# Patient Record
Sex: MALE | Race: WHITE | Employment: FULL TIME | URBAN - NONMETROPOLITAN AREA
[De-identification: names, ages, dates, MRNs, and addresses within clinical notes are randomized per-mention and may not be internally consistent; named-entity substitution may affect disease eponyms.]

---

## 2017-10-09 ENCOUNTER — HOSPITAL ENCOUNTER (EMERGENCY)
Age: 37
Discharge: HOME | End: 2017-10-09
Attending: EMERGENCY MEDICINE
Payer: COMMERCIAL

## 2017-10-09 VITALS
DIASTOLIC BLOOD PRESSURE: 66 MMHG | RESPIRATION RATE: 10 BRPM | SYSTOLIC BLOOD PRESSURE: 108 MMHG | BODY MASS INDEX: 30.53 KG/M2 | WEIGHT: 190 LBS | HEIGHT: 66 IN | OXYGEN SATURATION: 100 % | HEART RATE: 71 BPM | TEMPERATURE: 97.4 F

## 2017-10-09 DIAGNOSIS — R55 NEAR SYNCOPE: Primary | ICD-10-CM

## 2017-10-09 LAB
ALBUMIN SERPL-MCNC: 4.2 G/DL (ref 3.5–5.1)
ALP BLD-CCNC: 71 U/L (ref 38–126)
ALT SERPL-CCNC: 55 U/L (ref 11–66)
AMMONIA: 37 UMOL/L (ref 11–60)
ANION GAP SERPL CALCULATED.3IONS-SCNC: 10 MEQ/L (ref 8–16)
AST SERPL-CCNC: 31 U/L (ref 5–40)
BASOPHILS # BLD: 0.4 %
BASOPHILS ABSOLUTE: 0 THOU/MM3 (ref 0–0.1)
BILIRUB SERPL-MCNC: 0.6 MG/DL (ref 0.3–1.2)
BILIRUBIN DIRECT: < 0.2 MG/DL (ref 0–0.3)
BUN BLDV-MCNC: 14 MG/DL (ref 7–22)
CALCIUM SERPL-MCNC: 8.8 MG/DL (ref 8.5–10.5)
CHLORIDE BLD-SCNC: 104 MEQ/L (ref 98–111)
CO2: 26 MEQ/L (ref 23–33)
CREAT SERPL-MCNC: 0.9 MG/DL (ref 0.4–1.2)
EKG ATRIAL RATE: 63 BPM
EKG P AXIS: 54 DEGREES
EKG P-R INTERVAL: 130 MS
EKG Q-T INTERVAL: 380 MS
EKG QRS DURATION: 86 MS
EKG QTC CALCULATION (BAZETT): 388 MS
EKG R AXIS: 65 DEGREES
EKG T AXIS: 31 DEGREES
EKG VENTRICULAR RATE: 63 BPM
EOSINOPHIL # BLD: 2.5 %
EOSINOPHILS ABSOLUTE: 0.1 THOU/MM3 (ref 0–0.4)
FLU A ANTIGEN: NEGATIVE
FLU B ANTIGEN: NEGATIVE
GFR SERPL CREATININE-BSD FRML MDRD: > 90 ML/MIN/1.73M2
GLUCOSE BLD-MCNC: 107 MG/DL (ref 70–108)
HCT VFR BLD CALC: 47.3 % (ref 42–52)
HEMOGLOBIN: 16.4 GM/DL (ref 14–18)
HETEROPHILE ANTIBODIES: NEGATIVE
LYMPHOCYTES # BLD: 38.9 %
LYMPHOCYTES ABSOLUTE: 1.8 THOU/MM3 (ref 1–4.8)
MCH RBC QN AUTO: 30.3 PG (ref 27–31)
MCHC RBC AUTO-ENTMCNC: 34.6 GM/DL (ref 33–37)
MCV RBC AUTO: 87.5 FL (ref 80–94)
MONOCYTES # BLD: 8.7 %
MONOCYTES ABSOLUTE: 0.4 THOU/MM3 (ref 0.4–1.3)
NUCLEATED RED BLOOD CELLS: 0 /100 WBC
OSMOLALITY CALCULATION: 280.3 MOSMOL/KG (ref 275–300)
PDW BLD-RTO: 13.1 % (ref 11.5–14.5)
PLATELET # BLD: 117 THOU/MM3 (ref 130–400)
PMV BLD AUTO: 9.6 MCM (ref 7.4–10.4)
POTASSIUM SERPL-SCNC: 4 MEQ/L (ref 3.5–5.2)
RBC # BLD: 5.4 MILL/MM3 (ref 4.7–6.1)
RBC # BLD: NORMAL 10*6/UL
SEG NEUTROPHILS: 49.5 %
SEGMENTED NEUTROPHILS ABSOLUTE COUNT: 2.3 THOU/MM3 (ref 1.8–7.7)
SODIUM BLD-SCNC: 140 MEQ/L (ref 135–145)
TOTAL CK: 213 U/L (ref 55–170)
TOTAL PROTEIN: 6.5 G/DL (ref 6.1–8)
WBC # BLD: 4.7 THOU/MM3 (ref 4.8–10.8)

## 2017-10-09 PROCEDURE — 96361 HYDRATE IV INFUSION ADD-ON: CPT

## 2017-10-09 PROCEDURE — 87804 INFLUENZA ASSAY W/OPTIC: CPT

## 2017-10-09 PROCEDURE — 82140 ASSAY OF AMMONIA: CPT

## 2017-10-09 PROCEDURE — 82550 ASSAY OF CK (CPK): CPT

## 2017-10-09 PROCEDURE — 80076 HEPATIC FUNCTION PANEL: CPT

## 2017-10-09 PROCEDURE — 85025 COMPLETE CBC W/AUTO DIFF WBC: CPT

## 2017-10-09 PROCEDURE — 2580000003 HC RX 258: Performed by: EMERGENCY MEDICINE

## 2017-10-09 PROCEDURE — 80048 BASIC METABOLIC PNL TOTAL CA: CPT

## 2017-10-09 PROCEDURE — 96360 HYDRATION IV INFUSION INIT: CPT

## 2017-10-09 PROCEDURE — 36415 COLL VENOUS BLD VENIPUNCTURE: CPT

## 2017-10-09 PROCEDURE — 99284 EMERGENCY DEPT VISIT MOD MDM: CPT

## 2017-10-09 PROCEDURE — 93005 ELECTROCARDIOGRAM TRACING: CPT | Performed by: EMERGENCY MEDICINE

## 2017-10-09 PROCEDURE — 86308 HETEROPHILE ANTIBODY SCREEN: CPT

## 2017-10-09 RX ORDER — 0.9 % SODIUM CHLORIDE 0.9 %
1000 INTRAVENOUS SOLUTION INTRAVENOUS ONCE
Status: COMPLETED | OUTPATIENT
Start: 2017-10-09 | End: 2017-10-09

## 2017-10-09 RX ADMIN — SODIUM CHLORIDE 1000 ML: 9 INJECTION, SOLUTION INTRAVENOUS at 12:21

## 2017-10-09 NOTE — ED PROVIDER NOTES
[facility]  eMERGENCY dEPARTMENT eNCOUnter          CHIEF COMPLAINT       Chief Complaint   Patient presents with    Dizziness    Other     near syncope    Chest Pain       Nurses Notes reviewed and I agree except as noted in the HPI. HISTORY OF PRESENT ILLNESS    Monika Gamble is a 30 y.o. male. The patient presents to the ED for the evaluation of diaphoresis,  feeling of passing out and fatigue. He states he had an episode last Monday and then had 3 episodes within the hour toady that lasted 10-15 minutes. The patient states he has had this one time a long time ago, but doesn't remember when. He has an appointment with his PCP today at 2:45PM. He reports associated body aches, but he states that it is normal due to his job. He denies feeling skipping beats, irregular heart beat, ever wearing a heart monitor, cough, headache, fever or any recent illnesses. He denies any falls, injuries, or taking medications on a daily basis. REVIEW OF SYSTEMS     Constitutional: no fever or chills, positive for fatigue and diaphoresis  Respiratory: no dyspnea, no cough  Cardiovascular: positive for chest pain  Gastrointestinal : no abdominal pain    Remainder of review of systems is otherwise reviewed as negative. PAST MEDICAL HISTORY    has no past medical history on file. SURGICAL HISTORY      has no past surgical history on file. CURRENT MEDICATIONS       There are no discharge medications for this patient. ALLERGIES     is allergic to codeine. FAMILY HISTORY     has no family status information on file. family history is not on file. SOCIAL HISTORY      reports that he quit smoking about 13 months ago. He has never used smokeless tobacco. He reports that he does not drink alcohol or use drugs. PHYSICAL EXAM     INITIAL VITALS:  height is 5' 6\" (1.676 m) and weight is 190 lb (86.2 kg). His oral temperature is 97.4 °F (36.3 °C).  His blood pressure is 108/66 and his

## 2017-10-09 NOTE — ED NOTES
Patient states, I don't feel right, patient is sweaty and pale, head of bed elevated. Patient able to answer questions.       Lam Gaspar RN  10/09/17 1929

## 2017-11-03 ENCOUNTER — INITIAL CONSULT (OUTPATIENT)
Dept: NEUROLOGY | Age: 37
End: 2017-11-03
Payer: COMMERCIAL

## 2017-11-03 VITALS
HEART RATE: 54 BPM | BODY MASS INDEX: 31.53 KG/M2 | DIASTOLIC BLOOD PRESSURE: 75 MMHG | WEIGHT: 196.2 LBS | HEIGHT: 66 IN | SYSTOLIC BLOOD PRESSURE: 114 MMHG

## 2017-11-03 DIAGNOSIS — R41.0 EPISODIC CONFUSION: ICD-10-CM

## 2017-11-03 DIAGNOSIS — F07.81 POST-CONCUSSION SYNDROME: Primary | ICD-10-CM

## 2017-11-03 PROCEDURE — 99244 OFF/OP CNSLTJ NEW/EST MOD 40: CPT | Performed by: PSYCHIATRY & NEUROLOGY

## 2017-11-03 NOTE — PROGRESS NOTES
patient. Allergies   Allergen Reactions    Codeine Other (See Comments)     hallucinations       No current outpatient prescriptions on file. No current facility-administered medications for this visit. Social History     Social History    Marital status:      Spouse name: N/A    Number of children: N/A    Years of education: N/A     Social History Main Topics    Smoking status: Former Smoker     Quit date: 9/9/2016    Smokeless tobacco: Never Used    Alcohol use No    Drug use: No    Sexual activity: Not Asked     Other Topics Concern    None     Social History Narrative    None       Family History   Problem Relation Age of Onset    Cancer Father        Review of Systems   Complete review of systems is negative other than what is mentioned in HPI      Vitals:    11/03/17 0814   BP: 114/75   Site: Left Arm   Position: Sitting   Pulse: 54   Weight: 196 lb 3.2 oz (89 kg)   Height: 5' 5.98\" (1.676 m)       Physical Examination:  General appearance - alert, well appearing, and in no distress, oriented to person, place, and time and normal weight  Mental status- Level of Alertness: awake  Orientation: person, place, time  Memory: normal  Fund of Knowledge: normal  Attention/Concentration: normal  Language: normal. Mood is normal.   Neck - supple, no significant adenopathy, carotids upstroke normal bilaterally,   There is no carotid bruit . No neck lymphadenopathy .  No thyroid enlargement   Neurological -   Cranial Rlugje-FQ-SDR:.   Cranial nerve II: Normal   Cranial nerve III: Pupils: equal, round, reactive to light  Cranial nerves III, IV, VI: Extraocular Movements: intact   Cranial nerve V: Facial sensation: intact   Cranial nerve VII:Facial strength: intact   Cranial nerve VIII: Hearing: intact   Cranial nerve IX: Palate Elevation intact bilaterally  Cranial nerve XI: Shoulder shrug intact bilaterally  Cranial nerve XII: Tongue midline   neck supple without rigidity  DTR's are intact/decreased distal and symmetric  Babinski sign negative  Motor exam is 5/5 in the upper and lower extremities. Normal muscle tone . There is no muscle atrophy. Sensory is intact for light touch, cortical sensation. Coordination: finger to nose intact  Gait and station intact. Abnormal movement none, vibration normal, proprioception normal  Skin - normal coloration, no rashes, no suspicious skin lesions  Superficial temporal artery pulses are normal.   There is no limitation of range of motion of the neck. There is no resting tremor, no pin rolling, no bradykinesia, no Hypohonia, normal blink rate. Musculoskeletal: Has no hand arthritis, no limitation of ROM in any of the four extremities. There is no leg edema . The Heart was regular in rate and rhythm. No heart murmur  Chest Clear  Abdomen was soft. MRI brain performed at [address] [place] [address] was normal 10/23/2017. I reviewed this study, agree with interpretation. We reviewed the patient records from referring provider and available information in the EHR       ASSESSMENT:     1. Post-concussion syndrome     2. 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 He did not lose consciousness with the event. However, later he started to experience these episodes that are probably postconcussion syndrome, with episodic confusion. The patient's exam is nonfocal.  His mental status is intact at the time of my evaluation. Again, his symptoms are intermittent, he has good insight into what takes place. He would probably benefit from physical medicine rehab evaluation regarding postconcussion symptoms. In addition, we'll obtain workup for near syncope including EEG tilt table test.  We'll also arrange for 30 day event monitor.   I reviewed his MRI brain performed 10/23/2017, agree with interpretation being unremarkable. Given the nature of his symptoms, and his job. He is asked not to drive, swimming, or upright heavy machinery or being at compromising heights. He will address these restrictions with his work, if his work is unable to accommodate then we will take him off work, until determination is made that he is safe to return. The patient and his wife verbalized understanding to the workup plan, and recommendations. Plan    1. EEG  2. Tilt table test.   3. 30 day event monitor. 4. Consider PMNR referral re post concussion symptoms. 5. No driving, swimming, operating heavy machinery or compromising heights. If unable to be accommodate at work for restrictions, will take off work. 6. Call with any new symptoms or concerns. 7. Follow up in 3 weeks.      Yan Cazares MD

## 2017-11-08 ENCOUNTER — HOSPITAL ENCOUNTER (OUTPATIENT)
Dept: CT IMAGING | Age: 37
Discharge: HOME | End: 2017-11-08
Payer: COMMERCIAL

## 2017-11-08 ENCOUNTER — HOSPITAL ENCOUNTER (OUTPATIENT)
Dept: MRI IMAGING | Age: 37
Discharge: HOME | End: 2017-11-08
Payer: COMMERCIAL

## 2017-11-08 DIAGNOSIS — Z00.6 EXAMINATION FOR NORMAL COMPARISON FOR CLINICAL RESEARCH: ICD-10-CM

## 2017-11-08 PROCEDURE — 3209999900 MRI COMPARISON OF OUTSIDE FILMS

## 2017-11-08 PROCEDURE — 3209999900 CT COMPARISON OF OUTSIDE FILMS

## 2017-11-28 ENCOUNTER — HOSPITAL ENCOUNTER (OUTPATIENT)
Dept: NON INVASIVE DIAGNOSTICS | Age: 37
Discharge: HOME | End: 2017-11-28
Payer: COMMERCIAL

## 2017-11-28 ENCOUNTER — HOSPITAL ENCOUNTER (OUTPATIENT)
Dept: NEUROLOGY | Age: 37
Discharge: HOME | End: 2017-11-28
Payer: COMMERCIAL

## 2017-11-28 VITALS — HEIGHT: 67 IN | BODY MASS INDEX: 29.03 KG/M2 | WEIGHT: 185 LBS

## 2017-11-28 DIAGNOSIS — F07.81 POST-CONCUSSION SYNDROME: ICD-10-CM

## 2017-11-28 DIAGNOSIS — R41.0 EPISODIC CONFUSION: ICD-10-CM

## 2017-11-28 PROCEDURE — 93270 REMOTE 30 DAY ECG REV/REPORT: CPT

## 2017-11-28 PROCEDURE — 95819 EEG AWAKE AND ASLEEP: CPT

## 2017-11-28 PROCEDURE — 93660 TILT TABLE EVALUATION: CPT | Performed by: INTERNAL MEDICINE

## 2017-11-28 NOTE — PROGRESS NOTES
[facility] Laboratory Technician worksheet       EEG Date: 2017    Name: Severiano Bee   : 1980   Age: 30 y.o.   SEX: male    Room: OP    MRN: [id number]     CSN: [id number]    Ordering Provider: Eda Robles  EEG Number: [id number] Time of Test:  1024    Hand: Right   Sedation: No    H.V. Done: Yes with good effort Photic: Yes    Sleep: No   Drowsy: Yes   Sleep Deprived: No    Seizures observed: no    Technician impression:1    Mentality: alert       Clinical History: CONCUSSION ON  AND HAS HAD 7 EPISODES OF BREAKING UT IN SWEAT FEELING LIKE HE IS GOING TO PASS OUT    Past Medical History:       Diagnosis Date    Heart murmur          Prior to Admission medications    Not on File       Technician: Carlos Jim 2017

## 2017-11-29 NOTE — PROCEDURES
[address] [id number]                                  TILT TABLE TEST    PATIENT NAME: Anayeli Sultana                       :        1980  MED REC NO:   [id number]                           ROOM:  ACCOUNT NO:   [de-identified]                           ADMIT DATE: 2017  PROVIDER:     Ekaterina Larkin MD    DATE OF STUDY:  2017. The patient underwent a tilt table study for 22 minutes. At the start, his resting blood pressure was 122/73 and his heart rate was  51. Shortly, before the termination of the test at 22 minutes, his heart  rate was 85 and his systolic blood pressure was 120/74. The patient complained of nausea and lightheadedness. He requested that  his study be terminated. CONCLUSION:  Symptomatic study without significant change in heart rate or  systolic blood pressure.         Jules Feldman MD    D: 2017 11:33:54       T: 2017 12:01:33     [email or web address]  Job#: [id number]     Doc#: [id number]    CC:

## 2017-11-29 NOTE — PROCEDURES
[address] [place] [address]                            ELECTROENCEPHALOGRAM REPORT    PATIENT NAME: Kwesi Rothman                       :        1980  MED REC NO:   [id number]                           ROOM:  ACCOUNT NO:   [de-identified]                           ADMIT DATE: 2017  PROVIDER:     Zora Butcher. Manjula Saxena MD    DATE OF EE2017    REFERRING PROVIDER:  Zora Butcher. Manjula Saxena MD    CLINICAL HISTORY:  A 80-year-old male with history of concussion, had seven  episodes of breaking out in sweats, feeling like he is going to pass out. This is a 17-channel EEG performed without sleep deprivation. Hyperventilation was performed. Photic stimulation was performed. The  patient is described as alert. Background activity noted to be 10 Hz in posterior parietal area,  symmetric, well modulated, attenuates with eye opening. The patient felt  to be drowsy during parts of recording. Hyperventilation was performed for  3 minutes with good effort without abnormality. Lead and muscle artifacts  were noted limiting quality of data obtained. Photic stimulation was  performed with driving seen through some of the frequencies. EKG tracing  revealed regular heart rate. There was no evidence of epileptiform  activity appreciated throughout this recording. IMPRESSION:  This is a normal EEG. There was no evidence of epileptiform  activity appreciated.         Pippa Wick MD    D: 2017 14:18:51       T: 2017 14:22:14     [email or web address]  Job#: [id number]     Doc#: [id number]    CC:

## 2017-12-04 ENCOUNTER — TELEPHONE (OUTPATIENT)
Dept: NEUROLOGY | Age: 37
End: 2017-12-04

## 2017-12-04 NOTE — TELEPHONE ENCOUNTER
Patient called he was taken off work until December 3, 2017. He does not follow up with you until December 18, 2017. Can he get a letter extending his FMLA until he sees you on December 18, 2017. Please advise.

## 2017-12-18 ENCOUNTER — OFFICE VISIT (OUTPATIENT)
Dept: NEUROLOGY | Age: 37
End: 2017-12-18
Payer: COMMERCIAL

## 2017-12-18 VITALS
DIASTOLIC BLOOD PRESSURE: 78 MMHG | HEART RATE: 68 BPM | WEIGHT: 194 LBS | SYSTOLIC BLOOD PRESSURE: 132 MMHG | HEIGHT: 67 IN | BODY MASS INDEX: 30.45 KG/M2

## 2017-12-18 DIAGNOSIS — R41.0 EPISODIC CONFUSION: ICD-10-CM

## 2017-12-18 DIAGNOSIS — F07.81 POST-CONCUSSION SYNDROME: Primary | ICD-10-CM

## 2017-12-18 PROCEDURE — 99214 OFFICE O/P EST MOD 30 MIN: CPT | Performed by: PSYCHIATRY & NEUROLOGY

## 2017-12-18 NOTE — PROGRESS NOTES
with patient and his wife we agreed on the following plan. Plan:  1. Continue with 30 day event monitor. Wear device at all times. 2. No driving, swimming, operating heavy machinery or compromising heights. If unable to be accommodate at work for restrictions, will take off work. 3. Referral to [facility] referral re post concussion symptoms. 4. Neurology referral [facility] re episodic confusion. 5. He is to remain off work due to inability to drive, or operate heavy machinery until cleared. 6. Call with any new symptoms or concerns. 7. Follow up in 2 months. Time spent evaluating patient, reviewing records/imaging, with more than 50% of the time spent on counseling, arranging for care was more than 27 min. All patient's questions were answered. Please call if any questions.       Gabrielle Cornell MD

## 2018-02-28 ENCOUNTER — OFFICE VISIT (OUTPATIENT)
Dept: NEUROLOGY | Age: 38
End: 2018-02-28
Payer: COMMERCIAL

## 2018-02-28 VITALS
DIASTOLIC BLOOD PRESSURE: 70 MMHG | HEART RATE: 72 BPM | HEIGHT: 67 IN | WEIGHT: 195 LBS | SYSTOLIC BLOOD PRESSURE: 122 MMHG | BODY MASS INDEX: 30.61 KG/M2

## 2018-02-28 DIAGNOSIS — F07.81 POST-CONCUSSION SYNDROME: ICD-10-CM

## 2018-02-28 DIAGNOSIS — R41.0 EPISODIC CONFUSION: Primary | ICD-10-CM

## 2018-02-28 PROCEDURE — 99213 OFFICE O/P EST LOW 20 MIN: CPT | Performed by: PSYCHIATRY & NEUROLOGY

## 2018-02-28 NOTE — PROGRESS NOTES
Antigen NEGATIVE NEGATIVE   CBC auto differential   Result Value Ref Range    WBC 4.7 (L) 4.8 - 10.8 thou/mm3    RBC 5.40 4.70 - 6.10 mill/mm3    Hemoglobin 16.4 14.0 - 18.0 gm/dl    Hematocrit 47.3 42.0 - 52.0 %    MCV 87.5 80.0 - 94.0 fL    MCH 30.3 27.0 - 31.0 pg    MCHC 34.6 33.0 - 37.0 gm/dl    RDW 13.1 11.5 - 14.5 %    Platelets 043 (L) 896 - 400 thou/mm3    MPV 9.6 7.4 - 10.4 mcm    RBC Morphology NORMAL     Seg Neutrophils 49.5 %    Lymphocytes 38.9 %    Monocytes 8.7 %    Eosinophils 2.5 %    Basophils 0.4 %    nRBC 0 /100 wbc    Segs Absolute 2.3 1.8 - 7.7 thou/mm3    Lymphocytes # 1.8 1.0 - 4.8 thou/mm3    Monocytes # 0.4 0.4 - 1.3 thou/mm3    Eosinophils # 0.1 0.0 - 0.4 thou/mm3    Basophils # 0.0 0.0 - 0.1 thou/mm3   Basic Metabolic Panel   Result Value Ref Range    Sodium 140 135 - 145 meq/L    Potassium 4.0 3.5 - 5.2 meq/L    Chloride 104 98 - 111 meq/L    CO2 26 23 - 33 meq/L    Glucose 107 70 - 108 mg/dL    BUN 14 7 - 22 mg/dL    CREATININE 0.9 0.4 - 1.2 mg/dL    Calcium 8.8 8.5 - 10.5 mg/dL   CK   Result Value Ref Range    Total  (H) 55 - 170 U/L   Mononucleosis Screen   Result Value Ref Range    Monospot NEGATIVE NEGATIVE   Hepatic function panel   Result Value Ref Range    Alb 4.2 3.5 - 5.1 g/dL    Total Bilirubin 0.6 0.3 - 1.2 mg/dL    Bilirubin, Direct <0.2 0.0 - 0.3 mg/dL    Alkaline Phosphatase 71 38 - 126 U/L    AST 31 5 - 40 U/L    ALT 55 11 - 66 U/L    Total Protein 6.5 6.1 - 8.0 g/dL   Ammonia   Result Value Ref Range    Ammonia 37 11 - 60 umol/L   Anion Gap   Result Value Ref Range    Anion Gap 10.0 8.0 - 16.0 meq/L   Glomerular Filtration Rate, Estimated   Result Value Ref Range    Est, Glom Filt Rate >90 ml/min/1.73m2   Osmolality   Result Value Ref Range    Osmolality Calc 280.3 275.0 - 300 mOsmol/kg   EKG Dizziness   Result Value Ref Range    Ventricular Rate 63 BPM    Atrial Rate 63 BPM    P-R Interval 130 ms    QRS Duration 86 ms    Q-T Interval 380 ms    QTc Calculation

## 2018-05-09 ENCOUNTER — OFFICE VISIT (OUTPATIENT)
Dept: NEUROLOGY | Age: 38
End: 2018-05-09
Payer: COMMERCIAL

## 2018-05-09 VITALS
HEART RATE: 79 BPM | WEIGHT: 200 LBS | HEIGHT: 67 IN | BODY MASS INDEX: 31.39 KG/M2 | DIASTOLIC BLOOD PRESSURE: 70 MMHG | SYSTOLIC BLOOD PRESSURE: 128 MMHG

## 2018-05-09 DIAGNOSIS — R41.0 EPISODIC CONFUSION: Primary | ICD-10-CM

## 2018-05-09 PROCEDURE — 99213 OFFICE O/P EST LOW 20 MIN: CPT | Performed by: PSYCHIATRY & NEUROLOGY

## 2018-05-09 RX ORDER — VITAMIN B COMPLEX
1 CAPSULE ORAL DAILY
COMMUNITY
End: 2018-09-17

## 2018-08-17 ENCOUNTER — NURSE TRIAGE (OUTPATIENT)
Dept: ADMINISTRATIVE | Age: 38
End: 2018-08-17

## 2018-09-17 ENCOUNTER — OFFICE VISIT (OUTPATIENT)
Dept: UROLOGY | Age: 38
End: 2018-09-17
Payer: COMMERCIAL

## 2018-09-17 VITALS
HEIGHT: 66 IN | SYSTOLIC BLOOD PRESSURE: 120 MMHG | BODY MASS INDEX: 31.02 KG/M2 | WEIGHT: 193 LBS | DIASTOLIC BLOOD PRESSURE: 62 MMHG

## 2018-09-17 DIAGNOSIS — I86.1 VARICOCELE: Primary | ICD-10-CM

## 2018-09-17 DIAGNOSIS — N50.811 TESTICULAR PAIN, RIGHT: ICD-10-CM

## 2018-09-17 LAB
BILIRUBIN URINE: NEGATIVE
BLOOD URINE, POC: NEGATIVE
CHARACTER, URINE: CLEAR
COLOR, URINE: YELLOW
GLUCOSE URINE: NEGATIVE MG/DL
KETONES, URINE: NEGATIVE
LEUKOCYTE CLUMPS, URINE: NEGATIVE
NITRITE, URINE: NEGATIVE
PH, URINE: 7
PROTEIN, URINE: NEGATIVE MG/DL
SPECIFIC GRAVITY, URINE: >= 1.03 (ref 1–1.03)
UROBILINOGEN, URINE: 0.2 EU/DL

## 2018-09-17 PROCEDURE — 99204 OFFICE O/P NEW MOD 45 MIN: CPT | Performed by: NURSE PRACTITIONER

## 2018-09-17 PROCEDURE — 81003 URINALYSIS AUTO W/O SCOPE: CPT | Performed by: NURSE PRACTITIONER

## 2018-09-17 RX ORDER — LEVOTHYROXINE SODIUM 0.07 MG/1
75 TABLET ORAL DAILY
COMMUNITY

## 2018-09-17 RX ORDER — MIDODRINE HYDROCHLORIDE 5 MG/1
5 TABLET ORAL 3 TIMES DAILY
COMMUNITY
End: 2021-11-26

## 2018-09-17 RX ORDER — SILDENAFIL CITRATE 20 MG/1
20 TABLET ORAL PRN
Qty: 30 TABLET | Refills: 3 | Status: SHIPPED | OUTPATIENT
Start: 2018-09-17 | End: 2022-01-03

## 2018-09-17 NOTE — PROGRESS NOTES
grandfather. Social History  Sasha Larry  reports that he quit smoking about 2 years ago. He has never used smokeless tobacco. He reports that he does not drink alcohol or use drugs. Subjective:     Review of Systems  No problems with ears, nose or throat. No problems with eyes. No chest pain, shortness of breath, abdominal pain, extremity pain or weakness, and no neurological deficits. No rashes.  symptoms per HPI. The remainder of the review of symptoms is negative. Objective:     PE:   Vitals:    09/17/18 1032   BP: 120/62   Weight: 193 lb (87.5 kg)   Height: 5' 6\" (1.676 m)       Constitutional: Alert and oriented times 3, no acute distress and cooperative to examination with appropriate mood and affect. HENT:   Head:        Normocephalic and atraumatic. Mouth/Throat:         Mucous membranes are normal.   Eyes:         EOM are normal. No scleral icterus. PERRLA. Neck:        Supple, symmetrical, trachea midline  Pulmonary/Chest:      Chest symmetric with normal A/P diameter, Normal respiratory rate and rhthym. No use of accessory muscles. Abdominal:         Soft. No tenderness. Bowel sounds present. Musculoskeletal:         Normal range of motion. No edema or tenderness of lower extremities. Extremities: No cyanosis, clubbing, or edema present. Neurological:        Alert and oriented. No cranial nerve deficit. Lovetta Blaze     Psychiatric:        Normal mood and affect: Circumcised penis with urethral meatus in normal location, bilateral scrotal exam reveals a small epididymal cyst on the right, otherwise benign exam      Labs   Urine dip demonstrates   Results for POC orders placed in visit on 09/17/18   POCT Urinalysis No Micro (Auto)   Result Value Ref Range    Glucose, Ur Negative NEGATIVE mg/dl    Bilirubin Urine Negative     Ketones, Urine Negative NEGATIVE    Specific Gravity, Urine >= 1.030 1.002 - 1.03    Blood, UA POC Negative NEGATIVE    pH, Urine 7.00 5.0 - 9.0    Protein, Urine

## 2021-04-05 ENCOUNTER — OFFICE VISIT (OUTPATIENT)
Dept: PHYSICAL MEDICINE AND REHAB | Age: 41
End: 2021-04-05
Payer: COMMERCIAL

## 2021-04-05 VITALS
BODY MASS INDEX: 33.2 KG/M2 | SYSTOLIC BLOOD PRESSURE: 120 MMHG | WEIGHT: 206.6 LBS | DIASTOLIC BLOOD PRESSURE: 80 MMHG | HEIGHT: 66 IN

## 2021-04-05 DIAGNOSIS — M48.061 SPINAL STENOSIS OF LUMBAR REGION WITHOUT NEUROGENIC CLAUDICATION: ICD-10-CM

## 2021-04-05 DIAGNOSIS — M47.816 LUMBAR FACET ARTHROPATHY: Primary | ICD-10-CM

## 2021-04-05 DIAGNOSIS — M46.1 SACROILIAC INFLAMMATION (HCC): ICD-10-CM

## 2021-04-05 PROCEDURE — 99243 OFF/OP CNSLTJ NEW/EST LOW 30: CPT | Performed by: PAIN MEDICINE

## 2021-04-05 ASSESSMENT — ENCOUNTER SYMPTOMS
COLOR CHANGE: 0
EYE PAIN: 0
SINUS PRESSURE: 0
CHEST TIGHTNESS: 0
SHORTNESS OF BREATH: 0
SORE THROAT: 0
BOWEL INCONTINENCE: 0
CONSTIPATION: 0
COUGH: 0
BACK PAIN: 1
WHEEZING: 0
DIARRHEA: 0
NAUSEA: 0
VOMITING: 0
RHINORRHEA: 0
ABDOMINAL PAIN: 0
PHOTOPHOBIA: 0

## 2021-04-05 NOTE — PROGRESS NOTES
[address]  Dept: [phone]  Dept Fax: [phone]: [phone]    Visit Date: 4/5/2021    Patti Floyd is a 47 y.o. male who is referred for pain management evaluation and treatment per Dr. Vesna Meyer. CAGE [employer] [facility] Questions   1. In the last three months, have you felt you should cut down or stop drinking or using drugs? Yes []        No [x]     2. In the last three months, has anyone annoyed you or gotten on your nerves by telling you to cut down or stop drinking or using drugs? Yes []        No [x]     3. In the last three months, have you felt guilty or bad about how much you drink or use drugs? Yes []        No [x]     4. In the last three months, have you been waking up wanting to have an alcoholic drink or use drugs? Yes []        No [x]        Opioid Risk Tool:  Clinician Form       1. Family History of Substance Abuse: Female Male    Alcohol   []1   []3    Illegal drugs   []2   []3    Prescription drugs     []4   []4   2. Personal History of Substance Abuse:          Alcohol   []3   []3    Illegal drugs   []4   []4    Prescription drugs     []5   []5   3. Age (loc box if between 12 and 39):     []1   []1   4. History of Preadolescent Sexual Abuse:     []3   []0   5. Psychological Disease:      Attention deficit disorder, obsessive-compulsive disorder, bipolar, schizophrenia   []2   []2      Depression     []1   []1    Scoring Totals 0 0     Total Score  Low Risk  Moderate Risk  High Risk   Risk Category   0 - 3   4 - 7   8 or Above      Patient states symptoms interfere with:  A.  General Activity:  Yes, on concrete a lot or working  B. Mood: no    C. Walking Ability:   no   D. Normal Work (Includes both work outside the home and housework):   yes    E.  Relations with Other People:  no   F.   Sleep:   yes for the 20 mins  G. Enjoyment of Life:  no       HPI:     ChiefComplaint: Low back pain    Back Pain  Chronicity: Patient is a 46 y/o male with history of lower back pain for several years. States last severa years has became unbearable. Denies recent injuries or priro lumbar surgeries. The problem occurs constantly. The problem has been gradually worsening since onset. The pain is present in the lumbar spine. The quality of the pain is described as aching and stabbing. The pain does not radiate. Pain scale: Pain scale at worse is a 8/10 and at best is a 3/10. The pain is worse during the night. The symptoms are aggravated by bending, position and standing (walking, getting up from chair. States chelsi helps. ). Pertinent negatives include no abdominal pain, bladder incontinence, bowel incontinence, chest pain, fever, headaches, leg pain, numbness, tingling or weakness. Treatments tried: Massotherapy. The treatment provided mild relief. XR LUMBAR SPINE :  Shows lumbar facet arthropathy @ L4-5 and L5-S1     The patient is allergic to codeine. Subjective:      Review of Systems   Constitutional: Positive for activity change. Negative for appetite change, chills, diaphoresis, fatigue, fever and unexpected weight change. HENT: Negative for congestion, ear pain, hearing loss, mouth sores, nosebleeds, rhinorrhea, sinus pressure and sore throat. Eyes: Negative for photophobia, pain and visual disturbance. Respiratory: Negative for cough, chest tightness, shortness of breath and wheezing. MAURA-CPAP   Cardiovascular: Negative for chest pain and palpitations. Hx POTS   Gastrointestinal: Negative for abdominal pain, bowel incontinence, constipation, diarrhea, nausea and vomiting. Endocrine: Negative for cold intolerance, heat intolerance, polydipsia, polyphagia and polyuria.         Hx Hypothyroidism   Genitourinary: Negative for bladder incontinence, decreased urine volume, difficulty urinating, frequency and hematuria. Musculoskeletal: Positive for back pain. Negative for arthralgias, gait problem, joint swelling, myalgias, neck pain and neck stiffness. Skin: Negative for color change and rash. Allergic/Immunologic: Negative for food allergies and immunocompromised state. Neurological: Negative for dizziness, tingling, tremors, seizures, syncope, facial asymmetry, speech difficulty, weakness, light-headedness, numbness and headaches. Neuropathy   Hematological: Does not bruise/bleed easily. Psychiatric/Behavioral: Positive for sleep disturbance. Negative for agitation, behavioral problems, confusion, decreased concentration, dysphoric mood, hallucinations, self-injury and suicidal ideas. The patient is nervous/anxious. The patient is not hyperactive. Objective:     Vitals:    04/05/21 1545   BP: 120/80   Weight: 206 lb 9.6 oz (93.7 kg)   Height: 5' 6\" (1.676 m)       Physical Exam  Vitals signs and nursing note reviewed. Constitutional:       General: He is not in acute distress. Appearance: He is well-developed. He is not diaphoretic. HENT:      Head: Normocephalic and atraumatic. Right Ear: External ear normal.      Left Ear: External ear normal.      Nose: Nose normal.      Mouth/Throat:      Pharynx: No oropharyngeal exudate. Eyes:      General: No scleral icterus. Right eye: No discharge. Left eye: No discharge. Conjunctiva/sclera: Conjunctivae normal.      Pupils: Pupils are equal, round, and reactive to light. Neck:      Musculoskeletal: Full passive range of motion without pain, normal range of motion and neck supple. Normal range of motion. No edema, erythema, neck rigidity or muscular tenderness. Thyroid: No thyromegaly. Cardiovascular:      Rate and Rhythm: Normal rate and regular rhythm. Heart sounds: Normal heart sounds. No murmur. No friction rub. No gallop.     Pulmonary:      Effort: Pulmonary effort is normal. No respiratory distress. Breath sounds: Normal breath sounds. No wheezing or rales. Chest:      Chest wall: No tenderness. Abdominal:      General: Bowel sounds are normal. There is no distension. Palpations: Abdomen is soft. Tenderness: There is no abdominal tenderness. There is no guarding or rebound. Skin:     General: Skin is warm. Coloration: Skin is not pale. Findings: No erythema or rash. Neurological:      Mental Status: He is alert and oriented to person, place, and time. He is not disoriented. Cranial Nerves: No cranial nerve deficit. Sensory: Sensation is intact. No sensory deficit. Motor: Motor function is intact. No atrophy or abnormal muscle tone. Coordination: Coordination normal.      Gait: Gait normal.      Deep Tendon Reflexes: Reflexes are normal and symmetric. Babinski sign absent on the right side. Babinski sign absent on the left side. Psychiatric:         Attention and Perception: Attention normal. He is attentive. Mood and Affect: Mood normal. Mood is not anxious or depressed. Affect is not labile, blunt, angry or inappropriate. Speech: Speech normal. He is communicative. Speech is not rapid and pressured, delayed, slurred or tangential.         Behavior: Behavior normal. Behavior is not agitated, slowed, aggressive, withdrawn, hyperactive or combative. Behavior is cooperative. Thought Content: Thought content normal. Thought content is not paranoid or delusional. Thought content does not include homicidal or suicidal ideation. Thought content does not include homicidal or suicidal plan. Cognition and Memory: Cognition normal. Memory is not impaired. He does not exhibit impaired recent memory or impaired remote memory. Judgment: Judgment normal. Judgment is not impulsive or inappropriate. MELBA test: pos  Yeoman's test: pos  Gaenslen test: pos     Assessment:     1. Lumbar facet arthropathy    2.  Spinal stenosis of lumbar region with neurogenic claudication    3. Sacroiliac inflammation ([facility] [address]            Plan:      · Patient read and signed orientation and opioid agreement. · OARRS reviewed. Current MED: 0  · Patient was not offered naloxone for home. · Discussed long term side effects of medications, tolerance, dependency and addiction. · UDS preformed today. · Patient told can not receive any pain medications from any other source. · No evidence of abuse, diversion or aberrant behavior. · Prescription Needs: No prescription pain medications at this time   Medications and/or procedures to improve function and quality of life- patient understanding with this and that may not be pain free   Discussed possible weaning of medication dosing dependent on treatment/procedure results.  Discussed with patient about safe storage of medications at home   Testing: Reviewed XR Lumbar Spine .  Procedures: Bilateral L-facet MBB @ L4-5 and L5-S1 for diagnostic purpose.  Discussed with patient about risks with procedure including infection, reaction to medication, increased pain, or bleeding.  Medications: Reviewed.  If patient is on blood thinners will need approval to hold:   none      Previous Treatments tried:  · PT: Yes,  any benefit? Yes, how many weeks? 4-6, last date done: several times over last 2 years  · NSAIDs: No,    · Chiropractic: Yes,  any benefit? No  · Muscle relaxants: No,   · Narcotics: No,    · Spine surgeon consult: Yes  · Any Implants: No    Meds. Prescribed:   No orders of the defined types were placed in this encounter. Return Bilateral L-facet MBB @ L4-5 and L5-S1. Time spent with patient was 45 minutes more than 50% was spent  Counseling/coordinated the patient'scare.     Electronically signed by Juan Moses MD on 4/5/2021 at 5:43 PM

## 2021-04-06 ENCOUNTER — TELEPHONE (OUTPATIENT)
Dept: PHYSICAL MEDICINE AND REHAB | Age: 41
End: 2021-04-06

## 2021-04-06 NOTE — TELEPHONE ENCOUNTER
Pt. denies taking any blood thinners prior to procedure scheduling. Procedure and follow up scheduled. Educated on pre-procedure instructions, also mailed. Verbalized understanding.

## 2021-04-29 ENCOUNTER — TELEPHONE (OUTPATIENT)
Dept: PHYSICAL MEDICINE AND REHAB | Age: 41
End: 2021-04-29

## 2021-04-30 ENCOUNTER — ANESTHESIA (OUTPATIENT)
Dept: OPERATING ROOM | Age: 41
End: 2021-04-30
Payer: COMMERCIAL

## 2021-04-30 ENCOUNTER — ANESTHESIA EVENT (OUTPATIENT)
Dept: OPERATING ROOM | Age: 41
End: 2021-04-30
Payer: COMMERCIAL

## 2021-04-30 ENCOUNTER — HOSPITAL ENCOUNTER (OUTPATIENT)
Age: 41
Setting detail: OUTPATIENT SURGERY
Discharge: HOME | End: 2021-04-30
Attending: PAIN MEDICINE | Admitting: PAIN MEDICINE
Payer: COMMERCIAL

## 2021-04-30 ENCOUNTER — APPOINTMENT (OUTPATIENT)
Dept: GENERAL RADIOLOGY | Age: 41
End: 2021-04-30
Attending: PAIN MEDICINE
Payer: COMMERCIAL

## 2021-04-30 VITALS
SYSTOLIC BLOOD PRESSURE: 110 MMHG | DIASTOLIC BLOOD PRESSURE: 59 MMHG | OXYGEN SATURATION: 97 % | RESPIRATION RATE: 8 BRPM

## 2021-04-30 VITALS
HEIGHT: 66 IN | RESPIRATION RATE: 16 BRPM | SYSTOLIC BLOOD PRESSURE: 109 MMHG | HEART RATE: 72 BPM | DIASTOLIC BLOOD PRESSURE: 55 MMHG | BODY MASS INDEX: 31.82 KG/M2 | OXYGEN SATURATION: 96 % | WEIGHT: 198 LBS | TEMPERATURE: 97.1 F

## 2021-04-30 PROCEDURE — 7100000010 HC PHASE II RECOVERY - FIRST 15 MIN: Performed by: PAIN MEDICINE

## 2021-04-30 PROCEDURE — 6360000002 HC RX W HCPCS: Performed by: PAIN MEDICINE

## 2021-04-30 PROCEDURE — 3600000054 HC PAIN LEVEL 3 BASE: Performed by: PAIN MEDICINE

## 2021-04-30 PROCEDURE — 2709999900 HC NON-CHARGEABLE SUPPLY: Performed by: PAIN MEDICINE

## 2021-04-30 PROCEDURE — 2500000003 HC RX 250 WO HCPCS: Performed by: PAIN MEDICINE

## 2021-04-30 PROCEDURE — 64494 INJ PARAVERT F JNT L/S 2 LEV: CPT | Performed by: PAIN MEDICINE

## 2021-04-30 PROCEDURE — 6360000002 HC RX W HCPCS: Performed by: NURSE ANESTHETIST, CERTIFIED REGISTERED

## 2021-04-30 PROCEDURE — 7100000011 HC PHASE II RECOVERY - ADDTL 15 MIN: Performed by: PAIN MEDICINE

## 2021-04-30 PROCEDURE — 2500000003 HC RX 250 WO HCPCS: Performed by: NURSE ANESTHETIST, CERTIFIED REGISTERED

## 2021-04-30 PROCEDURE — 64493 INJ PARAVERT F JNT L/S 1 LEV: CPT | Performed by: PAIN MEDICINE

## 2021-04-30 PROCEDURE — 3209999900 FLUORO FOR SURGICAL PROCEDURES

## 2021-04-30 PROCEDURE — 3700000000 HC ANESTHESIA ATTENDED CARE: Performed by: PAIN MEDICINE

## 2021-04-30 RX ORDER — LIDOCAINE HYDROCHLORIDE 10 MG/ML
INJECTION, SOLUTION INFILTRATION; PERINEURAL PRN
Status: DISCONTINUED | OUTPATIENT
Start: 2021-04-30 | End: 2021-04-30 | Stop reason: ALTCHOICE

## 2021-04-30 RX ORDER — LIDOCAINE HYDROCHLORIDE 20 MG/ML
INJECTION, SOLUTION EPIDURAL; INFILTRATION; INTRACAUDAL; PERINEURAL PRN
Status: DISCONTINUED | OUTPATIENT
Start: 2021-04-30 | End: 2021-04-30 | Stop reason: SDUPTHER

## 2021-04-30 RX ORDER — BUPIVACAINE HYDROCHLORIDE 2.5 MG/ML
INJECTION, SOLUTION EPIDURAL; INFILTRATION; INTRACAUDAL PRN
Status: DISCONTINUED | OUTPATIENT
Start: 2021-04-30 | End: 2021-04-30 | Stop reason: ALTCHOICE

## 2021-04-30 RX ORDER — METHYLPREDNISOLONE ACETATE 80 MG/ML
INJECTION, SUSPENSION INTRA-ARTICULAR; INTRALESIONAL; INTRAMUSCULAR; SOFT TISSUE PRN
Status: DISCONTINUED | OUTPATIENT
Start: 2021-04-30 | End: 2021-04-30 | Stop reason: ALTCHOICE

## 2021-04-30 RX ORDER — PROPOFOL 10 MG/ML
INJECTION, EMULSION INTRAVENOUS PRN
Status: DISCONTINUED | OUTPATIENT
Start: 2021-04-30 | End: 2021-04-30 | Stop reason: SDUPTHER

## 2021-04-30 RX ADMIN — LIDOCAINE HYDROCHLORIDE 60 MG: 20 INJECTION, SOLUTION EPIDURAL; INFILTRATION; INTRACAUDAL; PERINEURAL at 09:02

## 2021-04-30 RX ADMIN — PROPOFOL 200 MG: 10 INJECTION, EMULSION INTRAVENOUS at 09:02

## 2021-04-30 ASSESSMENT — PULMONARY FUNCTION TESTS
PIF_VALUE: 0

## 2021-04-30 ASSESSMENT — PAIN DESCRIPTION - DESCRIPTORS: DESCRIPTORS: CONSTANT;ACHING

## 2021-04-30 ASSESSMENT — PAIN - FUNCTIONAL ASSESSMENT: PAIN_FUNCTIONAL_ASSESSMENT: 0-10

## 2021-04-30 NOTE — PROGRESS NOTES
1492- Pt arrived to PACU phase 2, [facility] RN at bedside for report, pt hooked up to monitor, VSS, pt sleepy, snoring, pt obstructing chin lift held. 0912- Sats 96% pt obstructs and trend down to 94%, chin lift needed again. 1624- Pt opened his eyes briefly. Pt back to sleep breathing regular not obstructing.   0921- Pt finally awake and talking,  0924- Coffee given, pt stil sleepy updated let him wake up a bit then he can go pt wanted [place]. 8250- IV removed  [id number]- Ride called.    8744- PT discharged walked out to car with this RN

## 2021-04-30 NOTE — H&P
H&P    Patient is a 48 y/o male with history of lower back pain for several years. States last severa years has became unbearable. Denies recent injuries or priro lumbar surgeries. The problem occurs constantly. The problem has been gradually worsening since onset. The pain is present in the lumbar spine. The quality of the pain is described as aching and stabbing. The pain does not radiate. Pain scale: Pain scale at worse is a 8/10 and at best is a 3/10. The pain is worse during the night. The symptoms are aggravated by bending, position and standing (walking, getting up from chair. States chelsi helps. ). Pertinent negatives include no abdominal pain, bladder incontinence, bowel incontinence, chest pain, fever, headaches, leg pain, numbness, tingling or weakness. Treatments tried: Massotherapy. The treatment provided mild relief.      XR LUMBAR SPINE :  Shows lumbar facet arthropathy @ L4-5 and L5-S1      The patient is allergic to codeine.        Subjective:      Review of Systems   Constitutional: Positive for activity change. Negative for appetite change, chills, diaphoresis, fatigue, fever and unexpected weight change. HENT: Negative for congestion, ear pain, hearing loss, mouth sores, nosebleeds, rhinorrhea, sinus pressure and sore throat. Eyes: Negative for photophobia, pain and visual disturbance. Respiratory: Negative for cough, chest tightness, shortness of breath and wheezing. MAURA-CPAP   Cardiovascular: Negative for chest pain and palpitations. Hx POTS   Gastrointestinal: Negative for abdominal pain, bowel incontinence, constipation, diarrhea, nausea and vomiting. Endocrine: Negative for cold intolerance, heat intolerance, polydipsia, polyphagia and polyuria. Hx Hypothyroidism   Genitourinary: Negative for bladder incontinence, decreased urine volume, difficulty urinating, frequency and hematuria. Musculoskeletal: Positive for back pain.  Negative for arthralgias, gait problem, joint swelling, myalgias, neck pain and neck stiffness. Skin: Negative for color change and rash. Allergic/Immunologic: Negative for food allergies and immunocompromised state. Neurological: Negative for dizziness, tingling, tremors, seizures, syncope, facial asymmetry, speech difficulty, weakness, light-headedness, numbness and headaches. Neuropathy   Hematological: Does not bruise/bleed easily. Psychiatric/Behavioral: Positive for sleep disturbance. Negative for agitation, behavioral problems, confusion, decreased concentration, dysphoric mood, hallucinations, self-injury and suicidal ideas. The patient is nervous/anxious. The patient is not hyperactive.          Objective:      Vitals   Vitals:     04/05/21 1545   BP: 120/80   Weight: 206 lb 9.6 oz (93.7 kg)   Height: 5' 6\" (1.676 m)            Physical Exam  Vitals signs and nursing note reviewed. Constitutional:       General: He is not in acute distress. Appearance: He is well-developed. He is not diaphoretic. HENT:      Head: Normocephalic and atraumatic. Right Ear: External ear normal.      Left Ear: External ear normal.      Nose: Nose normal.      Mouth/Throat:      Pharynx: No oropharyngeal exudate. Eyes:      General: No scleral icterus. Right eye: No discharge. Left eye: No discharge. Conjunctiva/sclera: Conjunctivae normal.      Pupils: Pupils are equal, round, and reactive to light. Neck:      Musculoskeletal: Full passive range of motion without pain, normal range of motion and neck supple. Normal range of motion. No edema, erythema, neck rigidity or muscular tenderness. Thyroid: No thyromegaly. Cardiovascular:      Rate and Rhythm: Normal rate and regular rhythm. Heart sounds: Normal heart sounds. No murmur. No friction rub. No gallop. Pulmonary:      Effort: Pulmonary effort is normal. No respiratory distress. Breath sounds: Normal breath sounds. No wheezing or rales. Chest:      Chest wall: No tenderness. Abdominal:      General: Bowel sounds are normal. There is no distension. Palpations: Abdomen is soft. Tenderness: There is no abdominal tenderness. There is no guarding or rebound. Skin:     General: Skin is warm. Coloration: Skin is not pale. Findings: No erythema or rash. Neurological:      Mental Status: He is alert and oriented to person, place, and time. He is not disoriented. Cranial Nerves: No cranial nerve deficit. Sensory: Sensation is intact. No sensory deficit. Motor: Motor function is intact. No atrophy or abnormal muscle tone. Coordination: Coordination normal.      Gait: Gait normal.      Deep Tendon Reflexes: Reflexes are normal and symmetric. Babinski sign absent on the right side. Babinski sign absent on the left side. Psychiatric:         Attention and Perception: Attention normal. He is attentive. Mood and Affect: Mood normal. Mood is not anxious or depressed. Affect is not labile, blunt, angry or inappropriate. Speech: Speech normal. He is communicative. Speech is not rapid and pressured, delayed, slurred or tangential.         Behavior: Behavior normal. Behavior is not agitated, slowed, aggressive, withdrawn, hyperactive or combative. Behavior is cooperative. Thought Content: Thought content normal. Thought content is not paranoid or delusional. Thought content does not include homicidal or suicidal ideation. Thought content does not include homicidal or suicidal plan. Cognition and Memory: Cognition normal. Memory is not impaired. He does not exhibit impaired recent memory or impaired remote memory. Judgment: Judgment normal. Judgment is not impulsive or inappropriate.         MELBA test: pos  Yeoman's test: pos  Gaenslen test: pos  Assessment:      1. Lumbar facet arthropathy    2. Spinal stenosis of lumbar region with neurogenic claudication    3.  Sacroiliac inflammation ([facility] [address]       Plan:      · Patient read and signed orientation and opioid agreement. · OARRS reviewed. Current MED: 0  · Patient was not offered naloxone for home. · Discussed long term side effects of medications, tolerance, dependency and addiction. · UDS preformed today. · Patient told can not receive any pain medications from any other source. · No evidence of abuse, diversion or aberrant behavior. · Prescription Needs: No prescription pain medications at this time  · Medications and/or procedures to improve function and quality of life- patient understanding with this and that may not be pain free  · Discussed possible weaning of medication dosing dependent on treatment/procedure results. · Discussed with patient about safe storage of medications at home  · Testing: Reviewed XR Lumbar Spine . · Procedures: Bilateral L-facet MBB @ L4-5 and L5-S1 for diagnostic purpose. · Discussed with patient about risks with procedure including infection, reaction to medication, increased pain, or bleeding. · Medications: Reviewed. · If patient is on blood thinners will need approval to hold:   none        Previous Treatments tried:  · PT: Yes,  any benefit? Yes, how many weeks? 4-6, last date done: several times over last 2 years  · NSAIDs: No,    · Chiropractic: Yes,  any benefit? No  · Muscle relaxants: No,   · Narcotics: No,    · Spine surgeon consult: Yes  · Any Implants: No     Meds. Prescribed:     Encounter Medications    No orders of the defined types were placed in this encounter.         Return Bilateral L-facet MBB @ L4-5 and L5-S1.

## 2021-04-30 NOTE — ANESTHESIA PRE PROCEDURE
Department of Anesthesiology  Preprocedure Note       Name:  Benito Singleton   Age:  47 y.o.  :  1980                                          MRN:  [id number]         Date:  2021      Surgeon: Nathan Bermudez):  Ammy Mcfarland MD    Procedure: Procedure(s):  Bilateral L-facet MBB @ L4-5 and L5-S1. Medications prior to admission:   Prior to Admission medications    Medication Sig Start Date End Date Taking? Authorizing Provider   levothyroxine (SYNTHROID) 25 MCG tablet Take 25 mcg by mouth Daily   Yes Historical Provider, MD   midodrine (PROAMATINE) 5 MG tablet Take 5 mg by mouth 3 times daily    Historical Provider, MD   metoprolol tartrate (LOPRESSOR) 25 MG tablet Take 12.5 mg by mouth 2 times daily    Historical Provider, MD   vitamin D (CHOLECALCIFEROL) 1000 UNIT TABS tablet Take 1,000 Units by mouth daily    Historical Provider, MD   sildenafil (REVATIO) 20 MG tablet Take 1 tablet by mouth as needed (ED) 18   DURGA Gerardo - CNP       Current medications:    No current facility-administered medications for this encounter. Allergies: Allergies   Allergen Reactions    Codeine Other (See Comments)     hallucinations       Problem List:  There is no problem list on file for this patient. Past Medical History:        Diagnosis Date    Heart murmur     Hypotension     POTS    POTS (postural orthostatic tachycardia syndrome)     Thyroid disease 2018       Past Surgical History:        Procedure Laterality Date    WISDOM TOOTH EXTRACTION         Social History:    Social History     Tobacco Use    Smoking status: Former Smoker     Quit date: 2016     Years since quittin.6    Smokeless tobacco: Never Used   Substance Use Topics    Alcohol use: No                                Counseling given: Not Answered      Vital Signs (Current): There were no vitals filed for this visit.                                            BP Readings from Last 3 Plan discussed with CRNA.                   Ruth Ann Paz MD   4/30/2021

## 2021-04-30 NOTE — OP NOTE
Operative Note  Pre-Procedure Note    Patient Name: Jeannie Armendariz   YOB: 1980  Room/Bed: [address] [place] [address] Pool/[facility]  Medical Record Number: [id number]  Date: 4/30/21      Indication:  Lower back pain  Consent: On file. Vital Signs:   Vitals:    04/30/21 0811   BP: 110/68   Pulse: 59   Resp: 16   Temp: 97.4 °F (36.3 °C)   SpO2: 97%       Pre-Sedation Documentation and Exam:   Vital signs have been reviewed (see flow sheet for vitals). Sedation/ Anesthesia Plan:   MAC    Patient is an appropriate candidate for plan of sedation: yes    Preoperative Diagnosis:   L-facet arthropathy    Postoperative Diagnosis:  As above    Procedure Performed:  Diagnostic/Confirmatory median branch blocks at the levels of L4-5 and L5-S1 bilateral under fluoroscopic guidance  # 1. .    Indication for the Procedure: The patient has a history of chronic low back pain that is not responding well to the conservative treatment. The patient's pain is mostly axial in nature. Pain is interfering with the activities of daily living. Physical examination revealed facet tenderness and facet loading is positive. The procedure and risks  were discussed with the patient and an informed consent was obtained. Procedure: Bilateral    Patient is placed in prone position and skin over  the back was prepped and draped in sterile manner. Then using fluoroscopy the junction of the transverse process of the vertebra with the superior process of the facet joint was observed and the view was optimized. The skin and deep tissues posterior were infiltrated with 10 ml of 1% lidocaine. Then a #22-gauge 3-1/2  inch spinal needle was introduced through the skin wheal under fluoroscopy guidance such that the tip of the needle lies at the junction of the transverse process with the superior processes of the facet joint. Then after negative aspiration a total of 8 ml of 0.25% Marcaine and depomedrol  (total 80 mg) was injected through the needles. EBL-0    For L5 Median branch block the junction of the ala of  the sacrum with the superior articular process of the facet joint was taken as a reference point and L4 median branch the junction of the transverse process the L5 with the superolateral possible facet joint was taken to the point and healthy median branch the junction of the transverse process of L4 with the superior lateral process of the facet joint was taken as a reference point. For S1 median branch the most lateral and superior aspect of S1 foramina was taken as a reference point. Patient's vital signs and neurological status remained stable through  the procedure and post procedural  Period. Patient was instructed to keep track of pain for next 24 hours. every hour and bring it with next visit. Patient tollerated the procedure well and was discharged home in stable condition.     Electronically signed by Margie Roberts MD on 4/30/21 at 9:14 AM EDT

## 2021-04-30 NOTE — H&P
[facility]  History and Physical Update    Pt Name: Alexsander Ruiz  MRN: [id number]  YOB: 1980  Date of evaluation: 4/30/2021      I have examined the patient and reviewed the H&P/Consult and there are no changes to the patient or plans.         Electronically signed by Madai Jones MD on 4/30/2021 at 8:36 AM

## 2021-05-12 ENCOUNTER — TELEPHONE (OUTPATIENT)
Dept: PHYSICAL MEDICINE AND REHAB | Age: 41
End: 2021-05-12

## 2021-05-12 ENCOUNTER — OFFICE VISIT (OUTPATIENT)
Dept: PHYSICAL MEDICINE AND REHAB | Age: 41
End: 2021-05-12
Payer: COMMERCIAL

## 2021-05-12 VITALS
BODY MASS INDEX: 31.82 KG/M2 | WEIGHT: 198 LBS | HEIGHT: 66 IN | DIASTOLIC BLOOD PRESSURE: 68 MMHG | SYSTOLIC BLOOD PRESSURE: 124 MMHG

## 2021-05-12 DIAGNOSIS — M46.1 SACROILIAC INFLAMMATION (HCC): ICD-10-CM

## 2021-05-12 DIAGNOSIS — M47.816 LUMBAR FACET ARTHROPATHY: Primary | ICD-10-CM

## 2021-05-12 DIAGNOSIS — M48.061 SPINAL STENOSIS OF LUMBAR REGION WITHOUT NEUROGENIC CLAUDICATION: ICD-10-CM

## 2021-05-12 DIAGNOSIS — M47.816 SPONDYLOSIS OF LUMBAR REGION WITHOUT MYELOPATHY OR RADICULOPATHY: ICD-10-CM

## 2021-05-12 PROCEDURE — 99214 OFFICE O/P EST MOD 30 MIN: CPT | Performed by: NURSE PRACTITIONER

## 2021-05-12 ASSESSMENT — ENCOUNTER SYMPTOMS
CONSTIPATION: 0
NAUSEA: 0
VOMITING: 0
COUGH: 0
EYE PAIN: 0
WHEEZING: 0
DIARRHEA: 0
ABDOMINAL PAIN: 0
SORE THROAT: 0
BACK PAIN: 1
SINUS PRESSURE: 0
PHOTOPHOBIA: 0
COLOR CHANGE: 0
CHEST TIGHTNESS: 0
SHORTNESS OF BREATH: 0
RHINORRHEA: 0

## 2021-05-12 NOTE — PROGRESS NOTES
[facility] [address]  Dept: [phone]  Dept Fax: [phone]: [phone]    Visit Date: 5/12/2021    Functionality Assessment/Goals Worksheet     On a scale of 0 (Does not Interfere) to 10 (Completely Interferes)     1. Which number describes how during the past week pain has interfered with       the following:  A. General Activity:  8  B. Mood: 8  C. Walking Ability:  8  D. Normal Work (Includes both work outside the home and housework):  8  E. Relations with Other People:   4  F. Sleep:   6  G. Enjoyment of Life:   7    2. Patient Prefers to Take their Pain Medications:     []  On a regular basis   []  Only when necessary    [x]  Does not take pain medications    3. What are the Patient's Goals/Expectations for Visiting Pain Management? [x]  Learn about my pain    []  Receive Medication   []  Physical Therapy     []  Treat Depression   []  Receive Injections    []  Treat Sleep   []  Deal with Anxiety and Stress   []  Treat Opoid Dependence/Addiction   []  Other:      HPI:   Stephen Arzate is a 47 y.o. male is here today for    Chief Complaint: Low back pain, Right leg pain, Left leg pain and Hip pain    HPI   F/U Lumbar facet MBB #1 L4-5,5-S1 bilateral 4/30/2021 states he received about 90% pain relief for 3-4 days. He continues to have low back pain bilateral SI thigh pain. He has increased pain with walking standing sitting getting up and down. Samuel Brittle He denies any ER visits since last visit. Pain scale with out pain medications or at its worst is 8/10. Pain scale with pain medications or at its best is 4/10. The patientis allergic to codeine. Subjective:      Review of Systems   Constitutional: Positive for activity change. Negative for appetite change, chills, diaphoresis, fatigue, fever and unexpected weight change.    HENT: Negative for congestion, ear pain, hearing loss, mouth sores, nosebleeds, rhinorrhea, sinus pressure and sore throat. Eyes: Negative for photophobia, pain and visual disturbance. Respiratory: Negative for cough, chest tightness, shortness of breath and wheezing. MAURA-CPAP   Cardiovascular: Negative for chest pain and palpitations. Hx POTS   Gastrointestinal: Negative for abdominal pain, constipation, diarrhea, nausea and vomiting. Endocrine: Negative for cold intolerance, heat intolerance, polydipsia, polyphagia and polyuria. Hx Hypothyroidism   Genitourinary: Negative for decreased urine volume, difficulty urinating, frequency and hematuria. Musculoskeletal: Positive for arthralgias, back pain and myalgias. Negative for gait problem, joint swelling, neck pain and neck stiffness. Skin: Negative for color change and rash. Allergic/Immunologic: Negative for food allergies and immunocompromised state. Neurological: Negative for dizziness, tremors, seizures, syncope, facial asymmetry, speech difficulty, weakness, light-headedness, numbness and headaches. Neuropathy   Hematological: Does not bruise/bleed easily. Psychiatric/Behavioral: Positive for sleep disturbance. Negative for agitation, behavioral problems, confusion, decreased concentration, dysphoric mood, hallucinations, self-injury and suicidal ideas. The patient is nervous/anxious. The patient is not hyperactive. Objective:     Vitals:    05/12/21 1326   BP: 124/68   Site: Left Upper Arm   Position: Sitting   Cuff Size: Medium Adult   Weight: 198 lb (89.8 kg)   Height: 5' 6\" (1.676 m)       Physical Exam  Vitals signs and nursing note reviewed. Constitutional:       General: He is not in acute distress. Appearance: He is well-developed. He is not diaphoretic. HENT:      Head: Normocephalic and atraumatic.       Right Ear: External ear normal.      Left Ear: External ear normal.      Nose: Nose normal. Mouth/Throat:      Pharynx: No oropharyngeal exudate. Eyes:      General: No scleral icterus. Right eye: No discharge. Left eye: No discharge. Conjunctiva/sclera: Conjunctivae normal.      Pupils: Pupils are equal, round, and reactive to light. Neck:      Musculoskeletal: Full passive range of motion without pain, normal range of motion and neck supple. Normal range of motion. No edema, erythema, neck rigidity or muscular tenderness. Thyroid: No thyromegaly. Cardiovascular:      Rate and Rhythm: Normal rate and regular rhythm. Heart sounds: Normal heart sounds. No murmur. No friction rub. No gallop. Pulmonary:      Effort: Pulmonary effort is normal. No respiratory distress. Breath sounds: Normal breath sounds. No wheezing or rales. Chest:      Chest wall: No tenderness. Abdominal:      General: Bowel sounds are normal. There is no distension. Palpations: Abdomen is soft. Tenderness: There is no abdominal tenderness. There is no guarding or rebound. Musculoskeletal:      Lumbar back: He exhibits decreased range of motion, tenderness, bony tenderness, pain and spasm. Back:    Skin:     General: Skin is warm. Coloration: Skin is not pale. Findings: No erythema or rash. Neurological:      Mental Status: He is alert and oriented to person, place, and time. He is not disoriented. Cranial Nerves: No cranial nerve deficit. Sensory: Sensation is intact. No sensory deficit. Motor: Motor function is intact. No atrophy or abnormal muscle tone. Coordination: Coordination normal.      Gait: Gait normal.      Deep Tendon Reflexes: Reflexes are normal and symmetric. Babinski sign absent on the right side. Babinski sign absent on the left side. Psychiatric:         Attention and Perception: Attention normal. He is attentive. Mood and Affect: Mood normal. Mood is not anxious or depressed.  Affect is not labile, blunt, angry or inappropriate. Speech: Speech normal. He is communicative. Speech is not rapid and pressured, delayed, slurred or tangential.         Behavior: Behavior normal. Behavior is not agitated, slowed, aggressive, withdrawn, hyperactive or combative. Behavior is cooperative. Thought Content: Thought content normal. Thought content is not paranoid or delusional. Thought content does not include homicidal or suicidal ideation. Thought content does not include homicidal or suicidal plan. Cognition and Memory: Cognition normal. Memory is not impaired. He does not exhibit impaired recent memory or impaired remote memory. Judgment: Judgment normal. Judgment is not impulsive or inappropriate. MELBA test: +  Yeomans test: +  Gaenslen test: +     Assessment:     1. Lumbar facet arthropathy    2. Spondylosis of lumbar region without myelopathy or radiculopathy    3. Spinal stenosis of lumbar region without neurogenic claudication    4. Sacroiliac inflammation ([facility] [address]            Plan:      · OARRS reviewed. Current MED:0  · Patient was not offered naloxone for home. · Discussed long term side effects of medications, tolerance, dependency and addiction. · Previous UDS reviewed  · UDS preformed today for compliance. · Patient told can not receive any pain medications from any other source. · No evidence of abuse, diversion or aberrant behavior.  Medications and/or procedures to improve function and quality of life- patient understanding with this and that may not be pain free   Discussed with patient about safe storage of medications at home   Discussed possible weaning of medication dosing dependent on treatment/procedure results.  Testing: lumbar XR   Procedures: Lumbar Facet MBB #2@ L4-5,5-S1   Discussed with patient about risks with procedure including infection, reaction to medication, increased pain, or bleeding.    Medications:none   If patient is on blood thinners will need approval to holdN/A      Meds. Prescribed:   No orders of the defined types were placed in this encounter. Return for Lumbar Facet MBB @L4-5, 5-S1 bilateral #2, Follow up after procedure.                Electronically signed by DURGA Mabry CNP on5/12/2021 at 1:46 PM

## 2021-06-04 ENCOUNTER — ANESTHESIA (OUTPATIENT)
Dept: OPERATING ROOM | Age: 41
End: 2021-06-04
Payer: COMMERCIAL

## 2021-06-04 ENCOUNTER — APPOINTMENT (OUTPATIENT)
Dept: GENERAL RADIOLOGY | Age: 41
End: 2021-06-04
Attending: PAIN MEDICINE
Payer: COMMERCIAL

## 2021-06-04 ENCOUNTER — ANESTHESIA EVENT (OUTPATIENT)
Dept: OPERATING ROOM | Age: 41
End: 2021-06-04
Payer: COMMERCIAL

## 2021-06-04 ENCOUNTER — HOSPITAL ENCOUNTER (OUTPATIENT)
Age: 41
Setting detail: OUTPATIENT SURGERY
Discharge: HOME | End: 2021-06-04
Attending: PAIN MEDICINE | Admitting: PAIN MEDICINE
Payer: COMMERCIAL

## 2021-06-04 VITALS
DIASTOLIC BLOOD PRESSURE: 64 MMHG | BODY MASS INDEX: 32.33 KG/M2 | RESPIRATION RATE: 18 BRPM | OXYGEN SATURATION: 95 % | TEMPERATURE: 96.8 F | WEIGHT: 201.2 LBS | HEIGHT: 66 IN | HEART RATE: 73 BPM | SYSTOLIC BLOOD PRESSURE: 108 MMHG

## 2021-06-04 VITALS
RESPIRATION RATE: 10 BRPM | TEMPERATURE: 96.8 F | DIASTOLIC BLOOD PRESSURE: 67 MMHG | OXYGEN SATURATION: 99 % | SYSTOLIC BLOOD PRESSURE: 111 MMHG

## 2021-06-04 PROCEDURE — 2580000003 HC RX 258: Performed by: NURSE ANESTHETIST, CERTIFIED REGISTERED

## 2021-06-04 PROCEDURE — 3600000054 HC PAIN LEVEL 3 BASE: Performed by: PAIN MEDICINE

## 2021-06-04 PROCEDURE — 2500000003 HC RX 250 WO HCPCS: Performed by: NURSE ANESTHETIST, CERTIFIED REGISTERED

## 2021-06-04 PROCEDURE — 7100000010 HC PHASE II RECOVERY - FIRST 15 MIN: Performed by: PAIN MEDICINE

## 2021-06-04 PROCEDURE — 64493 INJ PARAVERT F JNT L/S 1 LEV: CPT | Performed by: PAIN MEDICINE

## 2021-06-04 PROCEDURE — 2709999900 HC NON-CHARGEABLE SUPPLY: Performed by: PAIN MEDICINE

## 2021-06-04 PROCEDURE — 64494 INJ PARAVERT F JNT L/S 2 LEV: CPT | Performed by: PAIN MEDICINE

## 2021-06-04 PROCEDURE — 6360000002 HC RX W HCPCS: Performed by: NURSE ANESTHETIST, CERTIFIED REGISTERED

## 2021-06-04 PROCEDURE — 6360000002 HC RX W HCPCS: Performed by: PAIN MEDICINE

## 2021-06-04 PROCEDURE — 3209999900 FLUORO FOR SURGICAL PROCEDURES

## 2021-06-04 PROCEDURE — 7100000011 HC PHASE II RECOVERY - ADDTL 15 MIN: Performed by: PAIN MEDICINE

## 2021-06-04 PROCEDURE — 3700000000 HC ANESTHESIA ATTENDED CARE: Performed by: PAIN MEDICINE

## 2021-06-04 PROCEDURE — 2500000003 HC RX 250 WO HCPCS: Performed by: PAIN MEDICINE

## 2021-06-04 RX ORDER — LIDOCAINE HYDROCHLORIDE 10 MG/ML
INJECTION, SOLUTION INFILTRATION; PERINEURAL PRN
Status: DISCONTINUED | OUTPATIENT
Start: 2021-06-04 | End: 2021-06-04 | Stop reason: ALTCHOICE

## 2021-06-04 RX ORDER — BUPIVACAINE HYDROCHLORIDE 2.5 MG/ML
INJECTION, SOLUTION EPIDURAL; INFILTRATION; INTRACAUDAL PRN
Status: DISCONTINUED | OUTPATIENT
Start: 2021-06-04 | End: 2021-06-04 | Stop reason: ALTCHOICE

## 2021-06-04 RX ORDER — SODIUM CHLORIDE 9 MG/ML
INJECTION INTRAVENOUS PRN
Status: DISCONTINUED | OUTPATIENT
Start: 2021-06-04 | End: 2021-06-04 | Stop reason: SDUPTHER

## 2021-06-04 RX ORDER — LIDOCAINE HYDROCHLORIDE 10 MG/ML
INJECTION, SOLUTION INFILTRATION; PERINEURAL PRN
Status: DISCONTINUED | OUTPATIENT
Start: 2021-06-04 | End: 2021-06-04 | Stop reason: SDUPTHER

## 2021-06-04 RX ORDER — PROPOFOL 10 MG/ML
INJECTION, EMULSION INTRAVENOUS PRN
Status: DISCONTINUED | OUTPATIENT
Start: 2021-06-04 | End: 2021-06-04 | Stop reason: SDUPTHER

## 2021-06-04 RX ORDER — METHYLPREDNISOLONE ACETATE 80 MG/ML
INJECTION, SUSPENSION INTRA-ARTICULAR; INTRALESIONAL; INTRAMUSCULAR; SOFT TISSUE PRN
Status: DISCONTINUED | OUTPATIENT
Start: 2021-06-04 | End: 2021-06-04 | Stop reason: ALTCHOICE

## 2021-06-04 RX ADMIN — LIDOCAINE HYDROCHLORIDE 40 MG: 10 INJECTION, SOLUTION INFILTRATION; PERINEURAL at 11:42

## 2021-06-04 RX ADMIN — PROPOFOL 130 MG: 10 INJECTION, EMULSION INTRAVENOUS at 11:42

## 2021-06-04 RX ADMIN — SODIUM CHLORIDE 8 ML: 9 INJECTION, SOLUTION INTRAMUSCULAR; INTRAVENOUS; SUBCUTANEOUS at 11:46

## 2021-06-04 ASSESSMENT — PULMONARY FUNCTION TESTS
PIF_VALUE: 0

## 2021-06-04 ASSESSMENT — PAIN SCALES - GENERAL: PAINLEVEL_OUTOF10: 0

## 2021-06-04 ASSESSMENT — PAIN - FUNCTIONAL ASSESSMENT: PAIN_FUNCTIONAL_ASSESSMENT: 0-10

## 2021-06-04 NOTE — H&P
[address] [address]  History and Physical Update    Pt Name: Barbara Calderon  MRN: [id number]  YOB: 1980  Date of evaluation: 6/4/2021      I have examined the patient and reviewed the H&P/Consult and there are no changes to the patient or plans.         Electronically signed by Leslee Vera MD on 6/4/2021 at 10:42 AM

## 2021-06-04 NOTE — ANESTHESIA POSTPROCEDURE EVALUATION
Department of Anesthesiology  Postprocedure Note    Patient: Karen Jesus  MRN: [id number]  YOB: 1980  Date of evaluation: 6/4/2021  Time:  12:14 PM     Procedure Summary     Date: 06/04/21 Room / Location: [address] [place] [address] 03 / [facility]    Anesthesia Start: 1138 Anesthesia Stop: 7819    Procedure: Lumbar Facet MBB @L4-5, 5-S1 bilateral #2 (Bilateral Back) Diagnosis: (Lumbar facet arthropathy)    Surgeons: Columba Dawkins MD Responsible Provider: Hussain Beltrán MD    Anesthesia Type: MAC ASA Status: 2          Anesthesia Type: MAC    Minoo Phase I:      Minoo Phase II: Minoo Score: 10    Last vitals: Reviewed and per EMR flowsheets.        Anesthesia Post Evaluation    Patient location during evaluation: PACU  Complications: no  Cardiovascular status: hemodynamically stable

## 2021-06-04 NOTE — PROGRESS NOTES
1148-  Patient arrived to phase II via cart. Spontaneous respiraitons even and unlabored. Placed on monitor--VSS. Report received from [facility].   1694-  Assessment completed. Patient is alert and oriented x4. IV capped off-- no complications. Patient denies pain--will monitor. Injection sites clean and dry. 1153-  [employer] given to patient. Family in car.   1200-  IV removed-- no complications. Bandage applied. 1203-  All belongings in room. Discharge instructions given in pre-op, no further questions. 1206-  Patient discharged in stable condition with all belongings. This RN walked patient to car.

## 2021-06-04 NOTE — ANESTHESIA PRE PROCEDURE
Department of Anesthesiology  Preprocedure Note       Name:  Juan R Byrd   Age:  47 y.o.  :  1980                                          MRN:  [id number]         Date:  2021      Surgeon: Tana Fields):  Kristen Huntley MD    Procedure: Procedure(s):  Lumbar Facet MBB @L4-5, 5-S1 bilateral #2    Medications prior to admission:   Prior to Admission medications    Medication Sig Start Date End Date Taking? Authorizing Provider   midodrine (PROAMATINE) 5 MG tablet Take 5 mg by mouth 3 times daily    Historical Provider, MD   metoprolol tartrate (LOPRESSOR) 25 MG tablet Take 12.5 mg by mouth 2 times daily    Historical Provider, MD   levothyroxine (SYNTHROID) 25 MCG tablet Take 25 mcg by mouth Daily    Historical Provider, MD   vitamin D (CHOLECALCIFEROL) 1000 UNIT TABS tablet Take 1,000 Units by mouth daily    Historical Provider, MD   sildenafil (REVATIO) 20 MG tablet Take 1 tablet by mouth as needed (ED) 18   Kadi Cueto APRN - CNP       Current medications:    No current facility-administered medications for this encounter. Allergies: Allergies   Allergen Reactions    Codeine Other (See Comments)     hallucinations       Problem List:    Patient Active Problem List   Diagnosis Code    Lumbar facet arthropathy M47.816       Past Medical History:        Diagnosis Date    Heart murmur     Hypotension     POTS    POTS (postural orthostatic tachycardia syndrome)     Thyroid disease 2018       Past Surgical History:        Procedure Laterality Date    PAIN MANAGEMENT PROCEDURE Bilateral 2021    Bilateral L-facet MBB @ L4-5 and L5-S1. performed by Kristen Huntley MD at [address] [place] [address]         Social History:    Social History     Tobacco Use    Smoking status: Former Smoker     Quit date: 2016     Years since quittin.7    Smokeless tobacco: Never Used   Substance Use Topics    Alcohol use:  No Abdominal:           Vascular:                                        Anesthesia Plan      MAC     ASA 2             Anesthetic plan and risks discussed with patient.                     Edward Harris MD   6/4/2021

## 2021-06-04 NOTE — OP NOTE
Operative Note    Pre-Procedure Note    Patient Name: Jennifer Gutierrez   YOB: 1980  Room/Bed: [address] [place] [address] Drive Pool/[facility]  Medical Record Number: [id number]  Date: 6/4/21      Indication:  Lower back pain    Consent: On file. Vital Signs:   Vitals:    06/04/21 0911   BP: 105/73   Pulse: 63   Resp: 18   Temp: 96.9 °F (36.1 °C)   SpO2: 97%       Pre-Sedation Documentation and Exam:   Vital signs have been reviewed (see flow sheet for vitals). Sedation/ Anesthesia Plan:   MAC    Patient is an appropriate candidate for plan of sedation: yes      Preoperative Diagnosis:   L-facet arthropathy     Postoperative Diagnosis:  As above     Procedure Performed:  Diagnostic/Confirmatory median branch blocks at the levels of L4-5 and L5-S1 bilateral under fluoroscopic guidance  # 2.     Indication for the Procedure: The patient has a history of chronic low back pain that is not responding well to the conservative treatment. The patient's pain is mostly axial in nature. Pain is interfering with the activities of daily living. Physical examination revealed facet tenderness and facet loading is positive. The procedure and risks  were discussed with the patient and an informed consent was obtained.     Procedure: Bilateral     Patient is placed in prone position and skin over  the back was prepped and draped in sterile manner. Then using fluoroscopy the junction of the transverse process of the vertebra with the superior process of the facet joint was observed and the view was optimized. The skin and deep tissues posterior were infiltrated with 10 ml of 1% lidocaine. Then a #22-gauge 3-1/2  inch spinal needle was introduced through the skin wheal under fluoroscopy guidance such that the tip of the needle lies at the junction of the transverse process with the superior processes of the facet joint.   Then after negative aspiration a total of 8 ml of 0.25% Marcaine and depomedrol  (total 80 mg) was injected through the needles. EBL-0    For L5 Median branch block the junction of the ala of  the sacrum with the superior articular process of the facet joint was taken as a reference point and L4 median branch the junction of the transverse process the L5 with the superolateral possible facet joint was taken to the point and healthy median branch the junction of the transverse process of L4 with the superior lateral process of the facet joint was taken as a reference point. For S1 median branch the most lateral and superior aspect of S1 foramina was taken as a reference point.     Patient's vital signs and neurological status remained stable through  the procedure and post procedural  Period. Patient was instructed to keep track of pain for next 24 hours. every hour and bring it with next visit.  Patient tollerated the procedure well and was discharged home in stable condition.         Electronically signed by Maninder Cline MD on 6/4/21 at 11:41 AM EDT

## 2021-07-15 ENCOUNTER — OFFICE VISIT (OUTPATIENT)
Dept: PHYSICAL MEDICINE AND REHAB | Age: 41
End: 2021-07-15
Payer: COMMERCIAL

## 2021-07-15 VITALS
BODY MASS INDEX: 32.3 KG/M2 | HEIGHT: 66 IN | DIASTOLIC BLOOD PRESSURE: 70 MMHG | HEART RATE: 70 BPM | SYSTOLIC BLOOD PRESSURE: 126 MMHG | WEIGHT: 201 LBS

## 2021-07-15 DIAGNOSIS — M47.816 SPONDYLOSIS OF LUMBAR REGION WITHOUT MYELOPATHY OR RADICULOPATHY: Primary | ICD-10-CM

## 2021-07-15 DIAGNOSIS — M46.1 SACROILIAC INFLAMMATION (HCC): ICD-10-CM

## 2021-07-15 DIAGNOSIS — M48.061 SPINAL STENOSIS OF LUMBAR REGION WITHOUT NEUROGENIC CLAUDICATION: ICD-10-CM

## 2021-07-15 DIAGNOSIS — G89.4 CHRONIC PAIN SYNDROME: ICD-10-CM

## 2021-07-15 DIAGNOSIS — M47.816 LUMBAR FACET ARTHROPATHY: ICD-10-CM

## 2021-07-15 PROCEDURE — 99214 OFFICE O/P EST MOD 30 MIN: CPT | Performed by: NURSE PRACTITIONER

## 2021-07-15 ASSESSMENT — ENCOUNTER SYMPTOMS
PHOTOPHOBIA: 0
SHORTNESS OF BREATH: 0
ABDOMINAL PAIN: 0
DIARRHEA: 0
RHINORRHEA: 0
SORE THROAT: 0
CONSTIPATION: 0
EYE PAIN: 0
BACK PAIN: 1
VOMITING: 0
COLOR CHANGE: 0
COUGH: 0
WHEEZING: 0
NAUSEA: 0
CHEST TIGHTNESS: 0
SINUS PRESSURE: 0

## 2021-07-15 NOTE — PROGRESS NOTES
[address] [place] [address] [address]  Dept: [phone]  Dept Fax: [phone]: [phone]    Visit Date: 7/15/2021    Functionality Assessment/Goals Worksheet     On a scale of 0 (Does not Interfere) to 10 (Completely Interferes)     1. Which number describes how during the past week pain has interfered with       the following:  A. General Activity:  8  B. Mood: 7  C. Walking Ability:  8  D. Normal Work (Includes both work outside the home and housework):  7  E. Relations with Other People:   2  F. Sleep:   4  G. Enjoyment of Life:   7    2. Patient Prefers to Take their Pain Medications:     []  On a regular basis   []  Only when necessary    [x]  Does not take pain medications    3. What are the Patient's Goals/Expectations for Visiting Pain Management? [x]  Learn about my pain    []  Receive Medication   []  Physical Therapy     []  Treat Depression   []  Receive Injections    []  Treat Sleep   []  Deal with Anxiety and Stress   []  Treat Opoid Dependence/Addiction   []  Other:      HPI:   Kyler Brand is a 46 y.o. male is here today for    Chief Complaint: Low back pain, Right leg pain, Left leg pain and Hip pain    HPI   F/U Lumbar Facet MBB #2 L4-5,5-S1 bilateral  6/4/2021 states he received about 80% pain relief for 2-3 days. He continues to have low  Back bilateral SI hip pain. 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 He has been having a lot of muscle fatigue and cramping pain all over. Pain increases with bending, lifting, twisting , turning torso, reaching, pushing, pulling, walking, standing, stairs and getting up and down. PT, ice, heat, NSAIDS, OTC rubs creams patches and injections  sharp, shooting, stabbing, burning, aching, numbness and tingling    Medications reviewed. Patient denies side effects with medications. Patient states he is taking medications as prescribed. Hedenies receiving pain medications from other sources. He denies any ER visits since last visit. Pain scale with out pain medications or at its worst is 9/10. Pain scale with pain medications or at its best is 5/10. The patientis allergic to codeine. Subjective:      Review of Systems   Constitutional: Positive for activity change. Negative for appetite change, chills, diaphoresis, fatigue, fever and unexpected weight change. HENT: Negative for congestion, ear pain, hearing loss, mouth sores, nosebleeds, rhinorrhea, sinus pressure and sore throat. Eyes: Negative for photophobia, pain and visual disturbance. Respiratory: Negative for cough, chest tightness, shortness of breath and wheezing. MAURA-CPAP POTS   Cardiovascular: Negative for chest pain and palpitations. Hx POTS   Gastrointestinal: Negative for abdominal pain, constipation, diarrhea, nausea and vomiting. Endocrine: Negative for cold intolerance, heat intolerance, polydipsia, polyphagia and polyuria. Hx Hypothyroidism   Genitourinary: Negative for decreased urine volume, difficulty urinating, frequency and hematuria. Musculoskeletal: Positive for arthralgias, back pain, gait problem and myalgias. Negative for joint swelling, neck pain and neck stiffness. Skin: Negative for color change and rash. Allergic/Immunologic: Negative for food allergies and immunocompromised state. Neurological: Negative for dizziness, tremors, seizures, syncope, facial asymmetry, speech difficulty, weakness, light-headedness, numbness and headaches. Neuropathy SYNCOPE episodes has POTS   Hematological: Does not bruise/bleed easily. Psychiatric/Behavioral: Positive for sleep disturbance. Negative for agitation, behavioral problems, confusion, decreased concentration, dysphoric mood, hallucinations, self-injury and suicidal ideas. The patient is nervous/anxious.  The patient is not hyperactive. Objective:     Vitals:    07/15/21 1606   BP: 126/70   Site: Left Upper Arm   Position: Sitting   Cuff Size: Medium Adult   Pulse: 70   Weight: 201 lb (91.2 kg)   Height: 5' 6\" (1.676 m)       Physical Exam  Vitals and nursing note reviewed. Constitutional:       General: He is not in acute distress. Appearance: He is well-developed. He is not diaphoretic. HENT:      Head: Normocephalic and atraumatic. Right Ear: External ear normal.      Left Ear: External ear normal.      Nose: Nose normal.      Mouth/Throat:      Pharynx: No oropharyngeal exudate. Eyes:      General: No scleral icterus. Right eye: No discharge. Left eye: No discharge. Conjunctiva/sclera: Conjunctivae normal.      Pupils: Pupils are equal, round, and reactive to light. Neck:      Thyroid: No thyromegaly. Cardiovascular:      Rate and Rhythm: Normal rate and regular rhythm. Heart sounds: Normal heart sounds. No murmur heard. No friction rub. No gallop. Pulmonary:      Effort: Pulmonary effort is normal. No respiratory distress. Breath sounds: Normal breath sounds. No wheezing or rales. Chest:      Chest wall: No tenderness. Abdominal:      General: Bowel sounds are normal. There is no distension. Palpations: Abdomen is soft. Tenderness: There is no abdominal tenderness. There is no guarding or rebound. Musculoskeletal:      Left forearm: Tenderness present. Cervical back: Full passive range of motion without pain, normal range of motion and neck supple. No edema, erythema or rigidity. No muscular tenderness. Normal range of motion. Lumbar back: Spasms, tenderness and bony tenderness present. Decreased range of motion. Back:    Skin:     General: Skin is warm. Coloration: Skin is not pale. Findings: No erythema or rash. Neurological:      Mental Status: He is alert and oriented to person, place, and time.  He is not disoriented. Cranial Nerves: No cranial nerve deficit. Sensory: Sensation is intact. No sensory deficit. Motor: Motor function is intact. No atrophy or abnormal muscle tone. Coordination: Coordination normal.      Gait: Gait normal.      Deep Tendon Reflexes: Reflexes are normal and symmetric. Babinski sign absent on the right side. Babinski sign absent on the left side. Psychiatric:         Attention and Perception: Attention and perception normal. He is attentive. Mood and Affect: Mood and affect normal. Mood is not anxious or depressed. Affect is not labile, blunt, angry or inappropriate. Speech: Speech normal. He is communicative. Speech is not rapid and pressured, delayed, slurred or tangential.         Behavior: Behavior normal. Behavior is not agitated, slowed, aggressive, withdrawn, hyperactive or combative. Behavior is cooperative. Thought Content: Thought content normal. Thought content is not paranoid or delusional. Thought content does not include homicidal or suicidal ideation. Thought content does not include homicidal or suicidal plan. Cognition and Memory: Cognition and memory normal. Memory is not impaired. He does not exhibit impaired recent memory or impaired remote memory. Judgment: Judgment normal. Judgment is not impulsive or inappropriate. MELBA test: +  Yeomans test: +  Gaenslen test: +     Assessment:     1. Spondylosis of lumbar region without myelopathy or radiculopathy    2. Lumbar facet arthropathy    3. Spinal stenosis of lumbar region without neurogenic claudication    4. Sacroiliac inflammation ([facility] [address]    5. Chronic pain syndrome            Plan:      · OARRS reviewed. Current MED: 0  · Patient was not offered naloxone for home. · Discussed long term side effects of medications, tolerance, dependency and addiction. · Previous UDS reviewed  · UDS preformed today for compliance.   · Patient told can not receive any pain medications from any other source. · No evidence of abuse, diversion or aberrant behavior.  Medications and/or procedures to improve function and quality of life- patient understanding with this and that may not be pain free   Discussed with patient about safe storage of medications at home   Discussed possible weaning of medication dosing dependent on treatment/procedure results.  Testing: none   Procedures: Lumbar RFA Bilateral L4-5,5-S1    Discussed with patient about risks with procedure including infection, reaction to medication, increased pain, or bleeding.  Medications:discussed Neurontin Cymbalta Elavil NSAIDS muscle relaxer  TPI dry needling  Pt refuses    If patient is on blood thinners will need approval to hold N/A      Meds. Prescribed:   No orders of the defined types were placed in this encounter. Return for Lumbar RFA L4-5, 5-S1 bilateral if able if not left side first , Follow up after procedure.                Electronically signed by DURGA Azevedo CNP on7/15/2021 at 4:26 PM

## 2021-07-21 ENCOUNTER — TELEPHONE (OUTPATIENT)
Dept: PHYSICAL MEDICINE AND REHAB | Age: 41
End: 2021-07-21

## 2021-07-21 NOTE — TELEPHONE ENCOUNTER
Pt. Contacted. Procedure rescheduled to 8/13/2021 @ 9:45am, arrive @ 8:45am.Due to provider out of office.  Verbalized understanding

## 2021-08-05 ENCOUNTER — TELEPHONE (OUTPATIENT)
Dept: PHYSICAL MEDICINE AND REHAB | Age: 41
End: 2021-08-05

## 2021-08-05 NOTE — TELEPHONE ENCOUNTER
Spoke with Mónica-Wife (HIP[employer]). Procedure cancelled due to provider out of office. Verbalized understanding.

## 2021-09-16 ENCOUNTER — TELEPHONE (OUTPATIENT)
Dept: PHYSICAL MEDICINE AND REHAB | Age: 41
End: 2021-09-16

## 2021-11-11 ENCOUNTER — TELEPHONE (OUTPATIENT)
Dept: PHYSICAL MEDICINE AND REHAB | Age: 41
End: 2021-11-11

## 2021-11-11 NOTE — TELEPHONE ENCOUNTER
Pt. Contacted. Unable to reschedule procedure due possible having Covid. Will call back to reschedule.

## 2021-11-15 ENCOUNTER — NURSE TRIAGE (OUTPATIENT)
Dept: OTHER | Facility: CLINIC | Age: 41
End: 2021-11-15

## 2021-11-16 NOTE — TELEPHONE ENCOUNTER
Reason for Disposition   MODERATE difficulty breathing (e.g., speaks in phrases, SOB even at rest, pulse 100-120)    Answer Assessment - Initial Assessment Questions  1. COVID-19 DIAGNOSIS: \"Who made your Coronavirus (COVID-19) diagnosis? \" \"Was it confirmed by a positive lab test?\" If not diagnosed by a HCP, ask \"Are there lots of cases (community spread) where you live? \" (See public health department website, if unsure)      Positive test for himself (and wife)    2. COVID-19 EXPOSURE: \"Was there any known exposure to COVID before the symptoms began? \" CDC Definition of close contact: within 6 feet (2 meters) for a total of 15 minutes or more over a 24-hour period. Yes, wife also positive    3. ONSET: \"When did the COVID-19 symptoms start? \"       6 days    4. WORST SYMPTOM: \"What is your worst symptom? \" (e.g., cough, fever, shortness of breath, muscle aches)      Cough, fever, and back pain that is severe    5. COUGH: \"Do you have a cough? \" If so, ask: \"How bad is the cough? \"        Yes    6. FEVER: \"Do you have a fever? \" If so, ask: \"What is your temperature, how was it measured, and when did it start? \"      Fever above 101 and not below with Tylenol and Norco    7. RESPIRATORY STATUS: \"Describe your breathing? \" (e.g., shortness of breath, wheezing, unable to speak)       Denies    8. BETTER-SAME-WORSE: Cesar Haste you getting better, staying the same or getting worse compared to yesterday? \"  If getting worse, ask, \"In what way? \"      Same     9. HIGH RISK DISEASE: \"Do you have any chronic medical problems? \" (e.g., asthma, heart or lung disease, weak immune system, obesity, etc.)      PARAMJIT Wilson    10. PREGNANCY: \"Is there any chance you are pregnant? \" \"When was your last menstrual period? \"        N/a    11. OTHER SYMPTOMS: \"Do you have any other symptoms? \"  (e.g., chills, fatigue, headache, loss of smell or taste, muscle pain, sore throat; new loss of smell or taste especially support the diagnosis of COVID-19)        Restless in bed, muscle aches and pain, pain in joints/back is worst, has felt like he is having spasms    Protocols used: CORONAVIRUS (COVID-19) DIAGNOSED OR SUSPECTED-ADULT-    Brief description of triage: COVID, SOB, severe pain in back with spasms, cannot get fever below 101 and now is 102.9 after Tylenol. Triage indicates for patient to go to ED now. Care advice provided, patient verbalizes understanding; denies any other questions or concerns; instructed to call back for any new or worsening symptoms. This triage is a result of a call to [employer] a Nurse. Please do not respond to the triage nurse through this encounter. Any subsequent communication should be directly with the patient.

## 2021-11-20 ENCOUNTER — APPOINTMENT (OUTPATIENT)
Dept: CT IMAGING | Age: 41
DRG: 177 | End: 2021-11-20
Payer: COMMERCIAL

## 2021-11-20 ENCOUNTER — HOSPITAL ENCOUNTER (INPATIENT)
Age: 41
LOS: 4 days | Discharge: HOME | DRG: 177 | End: 2021-11-24
Attending: HOSPITALIST | Admitting: FAMILY MEDICINE
Payer: COMMERCIAL

## 2021-11-20 DIAGNOSIS — J12.82 PNEUMONIA DUE TO COVID-19 VIRUS: Primary | ICD-10-CM

## 2021-11-20 DIAGNOSIS — R06.89 DYSPNEA AND RESPIRATORY ABNORMALITIES: ICD-10-CM

## 2021-11-20 DIAGNOSIS — F41.9 ANXIETY: ICD-10-CM

## 2021-11-20 DIAGNOSIS — J18.9 COMMUNITY ACQUIRED PNEUMONIA, UNSPECIFIED LATERALITY: ICD-10-CM

## 2021-11-20 DIAGNOSIS — R06.00 DYSPNEA AND RESPIRATORY ABNORMALITIES: ICD-10-CM

## 2021-11-20 DIAGNOSIS — R09.02 HYPOXIA: ICD-10-CM

## 2021-11-20 DIAGNOSIS — U07.1 PNEUMONIA DUE TO COVID-19 VIRUS: Primary | ICD-10-CM

## 2021-11-20 PROBLEM — J96.01 ACUTE HYPOXEMIC RESPIRATORY FAILURE DUE TO COVID-19 (HCC): Status: ACTIVE | Noted: 2021-11-20

## 2021-11-20 LAB
ALBUMIN SERPL-MCNC: 3.7 G/DL (ref 3.5–5.1)
ALP BLD-CCNC: 73 U/L (ref 38–126)
ALT SERPL-CCNC: 36 U/L (ref 11–66)
ANION GAP SERPL CALCULATED.3IONS-SCNC: 11 MEQ/L (ref 8–16)
AST SERPL-CCNC: 53 U/L (ref 5–40)
BASOPHILS # BLD: 0 %
BASOPHILS ABSOLUTE: 0 THOU/MM3 (ref 0–0.1)
BILIRUB SERPL-MCNC: 0.4 MG/DL (ref 0.3–1.2)
BUN BLDV-MCNC: 21 MG/DL (ref 7–22)
C-REACTIVE PROTEIN: 6.24 MG/DL (ref 0–1)
CALCIUM SERPL-MCNC: 8.7 MG/DL (ref 8.5–10.5)
CHLORIDE BLD-SCNC: 102 MEQ/L (ref 98–111)
CO2: 26 MEQ/L (ref 23–33)
CREAT SERPL-MCNC: 0.9 MG/DL (ref 0.4–1.2)
EOSINOPHIL # BLD: 0 %
EOSINOPHILS ABSOLUTE: 0 THOU/MM3 (ref 0–0.4)
ERYTHROCYTE [DISTWIDTH] IN BLOOD BY AUTOMATED COUNT: 12.9 % (ref 11.5–14.5)
ERYTHROCYTE [DISTWIDTH] IN BLOOD BY AUTOMATED COUNT: 40.2 FL (ref 35–45)
GFR SERPL CREATININE-BSD FRML MDRD: > 90 ML/MIN/1.73M2
GLUCOSE BLD-MCNC: 121 MG/DL (ref 70–108)
HCT VFR BLD CALC: 42.6 % (ref 42–52)
HEMOGLOBIN: 14.9 GM/DL (ref 14–18)
IMMATURE GRANS (ABS): 0.05 THOU/MM3 (ref 0–0.07)
IMMATURE GRANULOCYTES: 0.6 %
LACTIC ACID: 1.3 MMOL/L (ref 0.5–2)
LYMPHOCYTES # BLD: 7.6 %
LYMPHOCYTES ABSOLUTE: 0.6 THOU/MM3 (ref 1–4.8)
MCH RBC QN AUTO: 30.2 PG (ref 26–33)
MCHC RBC AUTO-ENTMCNC: 35 GM/DL (ref 32.2–35.5)
MCV RBC AUTO: 86.4 FL (ref 80–94)
MONOCYTES # BLD: 3.5 %
MONOCYTES ABSOLUTE: 0.3 THOU/MM3 (ref 0.4–1.3)
NUCLEATED RED BLOOD CELLS: 0 /100 WBC
OSMOLALITY CALCULATION: 281.8 MOSMOL/KG (ref 275–300)
PLATELET # BLD: 191 THOU/MM3 (ref 130–400)
PMV BLD AUTO: 11.1 FL (ref 9.4–12.4)
POTASSIUM SERPL-SCNC: 3.8 MEQ/L (ref 3.5–5.2)
PROCALCITONIN: 0.5 NG/ML (ref 0.01–0.09)
RBC # BLD: 4.93 MILL/MM3 (ref 4.7–6.1)
SEG NEUTROPHILS: 88.3 %
SEGMENTED NEUTROPHILS ABSOLUTE COUNT: 7.3 THOU/MM3 (ref 1.8–7.7)
SODIUM BLD-SCNC: 139 MEQ/L (ref 135–145)
TOTAL PROTEIN: 6 G/DL (ref 6.1–8)
TROPONIN T: < 0.01 NG/ML
TSH SERPL DL<=0.05 MIU/L-ACNC: 1.06 UIU/ML (ref 0.4–4.2)
WBC # BLD: 8.3 THOU/MM3 (ref 4.8–10.8)

## 2021-11-20 PROCEDURE — 85025 COMPLETE CBC W/AUTO DIFF WBC: CPT

## 2021-11-20 PROCEDURE — 36415 COLL VENOUS BLD VENIPUNCTURE: CPT

## 2021-11-20 PROCEDURE — 99222 1ST HOSP IP/OBS MODERATE 55: CPT | Performed by: PHYSICIAN ASSISTANT

## 2021-11-20 PROCEDURE — 2580000003 HC RX 258: Performed by: PHYSICIAN ASSISTANT

## 2021-11-20 PROCEDURE — 86140 C-REACTIVE PROTEIN: CPT

## 2021-11-20 PROCEDURE — 82728 ASSAY OF FERRITIN: CPT

## 2021-11-20 PROCEDURE — 80053 COMPREHEN METABOLIC PANEL: CPT

## 2021-11-20 PROCEDURE — 71275 CT ANGIOGRAPHY CHEST: CPT

## 2021-11-20 PROCEDURE — 83605 ASSAY OF LACTIC ACID: CPT

## 2021-11-20 PROCEDURE — 84443 ASSAY THYROID STIM HORMONE: CPT

## 2021-11-20 PROCEDURE — 99284 EMERGENCY DEPT VISIT MOD MDM: CPT

## 2021-11-20 PROCEDURE — 6360000004 HC RX CONTRAST MEDICATION: Performed by: PHYSICIAN ASSISTANT

## 2021-11-20 PROCEDURE — 84145 PROCALCITONIN (PCT): CPT

## 2021-11-20 PROCEDURE — 83615 LACTATE (LD) (LDH) ENZYME: CPT

## 2021-11-20 PROCEDURE — 6370000000 HC RX 637 (ALT 250 FOR IP): Performed by: PHYSICIAN ASSISTANT

## 2021-11-20 PROCEDURE — 2060000000 HC ICU INTERMEDIATE R&B

## 2021-11-20 PROCEDURE — 84484 ASSAY OF TROPONIN QUANT: CPT

## 2021-11-20 PROCEDURE — 6360000002 HC RX W HCPCS: Performed by: PHYSICIAN ASSISTANT

## 2021-11-20 RX ORDER — ZINC SULFATE 50(220)MG
50 CAPSULE ORAL DAILY
Status: DISCONTINUED | OUTPATIENT
Start: 2021-11-21 | End: 2021-11-24 | Stop reason: HOSPADM

## 2021-11-20 RX ORDER — DEXAMETHASONE SODIUM PHOSPHATE 10 MG/ML
10 INJECTION, EMULSION INTRAMUSCULAR; INTRAVENOUS NIGHTLY
Status: DISCONTINUED | OUTPATIENT
Start: 2021-11-25 | End: 2021-11-24 | Stop reason: HOSPADM

## 2021-11-20 RX ORDER — SODIUM CHLORIDE 9 MG/ML
25 INJECTION, SOLUTION INTRAVENOUS PRN
Status: DISCONTINUED | OUTPATIENT
Start: 2021-11-20 | End: 2021-11-24 | Stop reason: HOSPADM

## 2021-11-20 RX ORDER — MAGNESIUM SULFATE IN WATER 40 MG/ML
2000 INJECTION, SOLUTION INTRAVENOUS PRN
Status: DISCONTINUED | OUTPATIENT
Start: 2021-11-20 | End: 2021-11-24 | Stop reason: HOSPADM

## 2021-11-20 RX ORDER — ACETAMINOPHEN 650 MG/1
650 SUPPOSITORY RECTAL EVERY 6 HOURS PRN
Status: DISCONTINUED | OUTPATIENT
Start: 2021-11-20 | End: 2021-11-24 | Stop reason: HOSPADM

## 2021-11-20 RX ORDER — ASCORBIC ACID 500 MG
500 TABLET ORAL DAILY
Status: DISCONTINUED | OUTPATIENT
Start: 2021-11-21 | End: 2021-11-24 | Stop reason: HOSPADM

## 2021-11-20 RX ORDER — POTASSIUM CHLORIDE 20 MEQ/1
40 TABLET, EXTENDED RELEASE ORAL PRN
Status: DISCONTINUED | OUTPATIENT
Start: 2021-11-20 | End: 2021-11-24 | Stop reason: HOSPADM

## 2021-11-20 RX ORDER — POTASSIUM CHLORIDE 7.45 MG/ML
10 INJECTION INTRAVENOUS PRN
Status: DISCONTINUED | OUTPATIENT
Start: 2021-11-20 | End: 2021-11-24 | Stop reason: HOSPADM

## 2021-11-20 RX ORDER — POLYETHYLENE GLYCOL 3350 17 G/17G
17 POWDER, FOR SOLUTION ORAL DAILY PRN
Status: DISCONTINUED | OUTPATIENT
Start: 2021-11-20 | End: 2021-11-24 | Stop reason: HOSPADM

## 2021-11-20 RX ORDER — MAGNESIUM HYDROXIDE/ALUMINUM HYDROXICE/SIMETHICONE 120; 1200; 1200 MG/30ML; MG/30ML; MG/30ML
30 SUSPENSION ORAL ONCE
Status: COMPLETED | OUTPATIENT
Start: 2021-11-20 | End: 2021-11-20

## 2021-11-20 RX ORDER — 0.9 % SODIUM CHLORIDE 0.9 %
1000 INTRAVENOUS SOLUTION INTRAVENOUS ONCE
Status: COMPLETED | OUTPATIENT
Start: 2021-11-20 | End: 2021-11-20

## 2021-11-20 RX ORDER — ONDANSETRON 4 MG/1
4 TABLET, ORALLY DISINTEGRATING ORAL EVERY 8 HOURS PRN
Status: DISCONTINUED | OUTPATIENT
Start: 2021-11-20 | End: 2021-11-24 | Stop reason: HOSPADM

## 2021-11-20 RX ORDER — VITAMIN B COMPLEX
1000 TABLET ORAL DAILY
Status: DISCONTINUED | OUTPATIENT
Start: 2021-11-21 | End: 2021-11-24 | Stop reason: HOSPADM

## 2021-11-20 RX ORDER — LEVOTHYROXINE SODIUM 0.03 MG/1
25 TABLET ORAL DAILY
Status: DISCONTINUED | OUTPATIENT
Start: 2021-11-21 | End: 2021-11-24 | Stop reason: HOSPADM

## 2021-11-20 RX ORDER — ASPIRIN 81 MG/1
81 TABLET, CHEWABLE ORAL DAILY
Status: DISCONTINUED | OUTPATIENT
Start: 2021-11-21 | End: 2021-11-24 | Stop reason: HOSPADM

## 2021-11-20 RX ORDER — ONDANSETRON 2 MG/ML
4 INJECTION INTRAMUSCULAR; INTRAVENOUS EVERY 6 HOURS PRN
Status: DISCONTINUED | OUTPATIENT
Start: 2021-11-20 | End: 2021-11-24 | Stop reason: HOSPADM

## 2021-11-20 RX ORDER — SODIUM CHLORIDE 0.9 % (FLUSH) 0.9 %
5-40 SYRINGE (ML) INJECTION PRN
Status: DISCONTINUED | OUTPATIENT
Start: 2021-11-20 | End: 2021-11-24 | Stop reason: HOSPADM

## 2021-11-20 RX ORDER — ALBUTEROL SULFATE 2.5 MG/3ML
2.5 SOLUTION RESPIRATORY (INHALATION) EVERY 6 HOURS PRN
Status: DISCONTINUED | OUTPATIENT
Start: 2021-11-20 | End: 2021-11-24 | Stop reason: HOSPADM

## 2021-11-20 RX ORDER — SODIUM CHLORIDE 0.9 % (FLUSH) 0.9 %
5-40 SYRINGE (ML) INJECTION EVERY 12 HOURS SCHEDULED
Status: DISCONTINUED | OUTPATIENT
Start: 2021-11-20 | End: 2021-11-24 | Stop reason: HOSPADM

## 2021-11-20 RX ORDER — ACETAMINOPHEN 325 MG/1
650 TABLET ORAL EVERY 6 HOURS PRN
Status: DISCONTINUED | OUTPATIENT
Start: 2021-11-20 | End: 2021-11-24 | Stop reason: HOSPADM

## 2021-11-20 RX ADMIN — SODIUM CHLORIDE 1000 ML: 9 INJECTION, SOLUTION INTRAVENOUS at 21:19

## 2021-11-20 RX ADMIN — SODIUM CHLORIDE, PRESERVATIVE FREE 10 ML: 5 INJECTION INTRAVENOUS at 23:45

## 2021-11-20 RX ADMIN — IOPAMIDOL 80 ML: 755 INJECTION, SOLUTION INTRAVENOUS at 21:41

## 2021-11-20 RX ADMIN — ALUMINUM HYDROXIDE, MAGNESIUM HYDROXIDE, AND SIMETHICONE 30 ML: 200; 200; 20 SUSPENSION ORAL at 23:41

## 2021-11-20 RX ADMIN — SODIUM CHLORIDE 20 MG: 900 INJECTION, SOLUTION INTRAVENOUS at 23:46

## 2021-11-20 RX ADMIN — ONDANSETRON 4 MG: 2 INJECTION INTRAMUSCULAR; INTRAVENOUS at 23:41

## 2021-11-21 PROBLEM — E03.9 ACQUIRED HYPOTHYROIDISM: Status: ACTIVE | Noted: 2021-11-21

## 2021-11-21 PROBLEM — J12.82 PNEUMONIA DUE TO COVID-19 VIRUS: Status: ACTIVE | Noted: 2021-11-21

## 2021-11-21 PROBLEM — J18.9 COMMUNITY ACQUIRED PNEUMONIA: Status: ACTIVE | Noted: 2021-11-21

## 2021-11-21 PROBLEM — U07.1 PNEUMONIA DUE TO COVID-19 VIRUS: Status: ACTIVE | Noted: 2021-11-21

## 2021-11-21 PROBLEM — R73.9 HYPERGLYCEMIA: Status: ACTIVE | Noted: 2021-11-21

## 2021-11-21 LAB
ALLEN TEST: POSITIVE
ANION GAP SERPL CALCULATED.3IONS-SCNC: 10 MEQ/L (ref 8–16)
BASE EXCESS (CALCULATED): 0.8 MMOL/L (ref -2.5–2.5)
BASOPHILS # BLD: 0.1 %
BASOPHILS ABSOLUTE: 0 THOU/MM3 (ref 0–0.1)
BUN BLDV-MCNC: 19 MG/DL (ref 7–22)
CALCIUM SERPL-MCNC: 8 MG/DL (ref 8.5–10.5)
CHLORIDE BLD-SCNC: 107 MEQ/L (ref 98–111)
CO2: 24 MEQ/L (ref 23–33)
COLLECTED BY:: ABNORMAL
CREAT SERPL-MCNC: 0.9 MG/DL (ref 0.4–1.2)
DEVICE: ABNORMAL
EKG ATRIAL RATE: 62 BPM
EKG P AXIS: 75 DEGREES
EKG P-R INTERVAL: 120 MS
EKG Q-T INTERVAL: 424 MS
EKG QRS DURATION: 86 MS
EKG QTC CALCULATION (BAZETT): 430 MS
EKG R AXIS: 75 DEGREES
EKG T AXIS: 52 DEGREES
EKG VENTRICULAR RATE: 62 BPM
EOSINOPHIL # BLD: 0 %
EOSINOPHILS ABSOLUTE: 0 THOU/MM3 (ref 0–0.4)
ERYTHROCYTE [DISTWIDTH] IN BLOOD BY AUTOMATED COUNT: 13.2 % (ref 11.5–14.5)
ERYTHROCYTE [DISTWIDTH] IN BLOOD BY AUTOMATED COUNT: 41.2 FL (ref 35–45)
FERRITIN: 1767 NG/ML (ref 22–322)
GFR SERPL CREATININE-BSD FRML MDRD: > 90 ML/MIN/1.73M2
GLUCOSE BLD-MCNC: 141 MG/DL (ref 70–108)
HCO3: 25 MMOL/L (ref 23–28)
HCT VFR BLD CALC: 39.8 % (ref 42–52)
HEMOGLOBIN: 14.1 GM/DL (ref 14–18)
IFIO2: 100
IMMATURE GRANS (ABS): 0.06 THOU/MM3 (ref 0–0.07)
IMMATURE GRANULOCYTES: 0.7 %
LD: 877 U/L (ref 100–190)
LYMPHOCYTES # BLD: 6 %
LYMPHOCYTES ABSOLUTE: 0.5 THOU/MM3 (ref 1–4.8)
MCH RBC QN AUTO: 30.7 PG (ref 26–33)
MCHC RBC AUTO-ENTMCNC: 35.4 GM/DL (ref 32.2–35.5)
MCV RBC AUTO: 86.7 FL (ref 80–94)
MONOCYTES # BLD: 2.3 %
MONOCYTES ABSOLUTE: 0.2 THOU/MM3 (ref 0.4–1.3)
NUCLEATED RED BLOOD CELLS: 0 /100 WBC
O2 SATURATION: 100 %
OSMOLALITY CALCULATION: 285.9 MOSMOL/KG (ref 275–300)
PCO2: 37 MMHG (ref 35–45)
PH BLOOD GAS: 7.44 (ref 7.35–7.45)
PLATELET # BLD: 171 THOU/MM3 (ref 130–400)
PMV BLD AUTO: 11 FL (ref 9.4–12.4)
PO2: 187 MMHG (ref 71–104)
POTASSIUM REFLEX MAGNESIUM: 4.1 MEQ/L (ref 3.5–5.2)
RBC # BLD: 4.59 MILL/MM3 (ref 4.7–6.1)
SEG NEUTROPHILS: 90.9 %
SEGMENTED NEUTROPHILS ABSOLUTE COUNT: 7.5 THOU/MM3 (ref 1.8–7.7)
SODIUM BLD-SCNC: 141 MEQ/L (ref 135–145)
SOURCE, BLOOD GAS: ABNORMAL
WBC # BLD: 8.2 THOU/MM3 (ref 4.8–10.8)

## 2021-11-21 PROCEDURE — 85025 COMPLETE CBC W/AUTO DIFF WBC: CPT

## 2021-11-21 PROCEDURE — 2580000003 HC RX 258: Performed by: PHYSICIAN ASSISTANT

## 2021-11-21 PROCEDURE — 87040 BLOOD CULTURE FOR BACTERIA: CPT

## 2021-11-21 PROCEDURE — 2700000000 HC OXYGEN THERAPY PER DAY

## 2021-11-21 PROCEDURE — 93005 ELECTROCARDIOGRAM TRACING: CPT | Performed by: PHYSICIAN ASSISTANT

## 2021-11-21 PROCEDURE — 93010 ELECTROCARDIOGRAM REPORT: CPT | Performed by: NUCLEAR MEDICINE

## 2021-11-21 PROCEDURE — 94660 CPAP INITIATION&MGMT: CPT

## 2021-11-21 PROCEDURE — 36415 COLL VENOUS BLD VENIPUNCTURE: CPT

## 2021-11-21 PROCEDURE — 99233 SBSQ HOSP IP/OBS HIGH 50: CPT | Performed by: HOSPITALIST

## 2021-11-21 PROCEDURE — 36600 WITHDRAWAL OF ARTERIAL BLOOD: CPT

## 2021-11-21 PROCEDURE — 6370000000 HC RX 637 (ALT 250 FOR IP): Performed by: HOSPITALIST

## 2021-11-21 PROCEDURE — 2060000000 HC ICU INTERMEDIATE R&B

## 2021-11-21 PROCEDURE — 6360000002 HC RX W HCPCS

## 2021-11-21 PROCEDURE — 82803 BLOOD GASES ANY COMBINATION: CPT

## 2021-11-21 PROCEDURE — 6370000000 HC RX 637 (ALT 250 FOR IP): Performed by: PHYSICIAN ASSISTANT

## 2021-11-21 PROCEDURE — 6360000002 HC RX W HCPCS: Performed by: PHYSICIAN ASSISTANT

## 2021-11-21 PROCEDURE — 80048 BASIC METABOLIC PNL TOTAL CA: CPT

## 2021-11-21 RX ORDER — DIPHENHYDRAMINE HYDROCHLORIDE 50 MG/ML
25 INJECTION INTRAMUSCULAR; INTRAVENOUS EVERY 6 HOURS PRN
Status: DISCONTINUED | OUTPATIENT
Start: 2021-11-21 | End: 2021-11-24 | Stop reason: HOSPADM

## 2021-11-21 RX ORDER — CALCIUM CARBONATE 200(500)MG
500 TABLET,CHEWABLE ORAL 3 TIMES DAILY PRN
Status: DISCONTINUED | OUTPATIENT
Start: 2021-11-21 | End: 2021-11-23

## 2021-11-21 RX ORDER — DIPHENHYDRAMINE HYDROCHLORIDE 50 MG/ML
INJECTION INTRAMUSCULAR; INTRAVENOUS
Status: COMPLETED
Start: 2021-11-21 | End: 2021-11-21

## 2021-11-21 RX ADMIN — SODIUM CHLORIDE 25 ML: 9 INJECTION, SOLUTION INTRAVENOUS at 23:12

## 2021-11-21 RX ADMIN — Medication 1000 UNITS: at 10:09

## 2021-11-21 RX ADMIN — DEXAMETHASONE SODIUM PHOSPHATE 20 MG: 4 INJECTION INTRA-ARTICULAR; INTRALESIONAL; INTRAMUSCULAR; INTRAVENOUS; SOFT TISSUE at 23:13

## 2021-11-21 RX ADMIN — SODIUM CHLORIDE, PRESERVATIVE FREE 10 ML: 5 INJECTION INTRAVENOUS at 10:09

## 2021-11-21 RX ADMIN — OXYCODONE HYDROCHLORIDE AND ACETAMINOPHEN 500 MG: 500 TABLET ORAL at 10:09

## 2021-11-21 RX ADMIN — Medication 50 MG: at 10:09

## 2021-11-21 RX ADMIN — ENOXAPARIN SODIUM 40 MG: 100 INJECTION SUBCUTANEOUS at 12:02

## 2021-11-21 RX ADMIN — DIPHENHYDRAMINE HYDROCHLORIDE 25 MG: 50 INJECTION INTRAMUSCULAR; INTRAVENOUS at 04:23

## 2021-11-21 RX ADMIN — LEVOTHYROXINE SODIUM 25 MCG: 0.03 TABLET ORAL at 08:40

## 2021-11-21 RX ADMIN — SODIUM CHLORIDE, PRESERVATIVE FREE 10 ML: 5 INJECTION INTRAVENOUS at 22:58

## 2021-11-21 RX ADMIN — AZITHROMYCIN MONOHYDRATE 500 MG: 500 INJECTION, POWDER, LYOPHILIZED, FOR SOLUTION INTRAVENOUS at 02:57

## 2021-11-21 RX ADMIN — ANTACID TABLETS 500 MG: 500 TABLET, CHEWABLE ORAL at 20:56

## 2021-11-21 RX ADMIN — DIPHENHYDRAMINE HYDROCHLORIDE 25 MG: 50 INJECTION, SOLUTION INTRAMUSCULAR; INTRAVENOUS at 04:23

## 2021-11-21 RX ADMIN — CEFTRIAXONE SODIUM 1000 MG: 1 INJECTION, POWDER, FOR SOLUTION INTRAMUSCULAR; INTRAVENOUS at 02:15

## 2021-11-21 RX ADMIN — ASPIRIN 81 MG CHEWABLE TABLET 81 MG: 81 TABLET CHEWABLE at 10:09

## 2021-11-21 RX ADMIN — ANTACID TABLETS 500 MG: 500 TABLET, CHEWABLE ORAL at 15:45

## 2021-11-21 RX ADMIN — ENOXAPARIN SODIUM 40 MG: 100 INJECTION SUBCUTANEOUS at 02:24

## 2021-11-21 RX ADMIN — BARICITINIB 4 MG: 2 TABLET, FILM COATED ORAL at 15:45

## 2021-11-21 RX ADMIN — ONDANSETRON 4 MG: 2 INJECTION INTRAMUSCULAR; INTRAVENOUS at 23:15

## 2021-11-21 RX ADMIN — ACETAMINOPHEN 650 MG: 325 TABLET ORAL at 00:32

## 2021-11-21 ASSESSMENT — ENCOUNTER SYMPTOMS
NAUSEA: 1
SHORTNESS OF BREATH: 1
EYE ITCHING: 0
DIARRHEA: 1
SINUS PRESSURE: 0
SORE THROAT: 0
VOMITING: 0
ABDOMINAL PAIN: 1
ALLERGIC/IMMUNOLOGIC NEGATIVE: 1
BACK PAIN: 1
EYE DISCHARGE: 0
RHINORRHEA: 1
COUGH: 1
VOMITING: 1
CHEST TIGHTNESS: 1
ABDOMINAL PAIN: 0
EYES NEGATIVE: 1

## 2021-11-21 ASSESSMENT — PAIN SCALES - GENERAL
PAINLEVEL_OUTOF10: 0
PAINLEVEL_OUTOF10: 0

## 2021-11-21 NOTE — ED PROVIDER NOTES
[facility] EMERGENCY DEPT      CHIEF COMPLAINT       Chief Complaint   Patient presents with    Shortness of Breath       Nurses Notes reviewed and I agree except as noted in the HPI. HISTORY OF PRESENT ILLNESS    Seb Haque is a 46 y.o. male who presents for shortness of breath and hypoxia in the setting of Covid. Patient has been sick for 10 days with Covid symptoms. He has gone to [facility] ER twice and tested positive for Covid. Despite hypoxia he was discharged both times. He was never offered Regeneron therapy. Wife voices frustration. Patient is still ill, no better, and perhaps worse today. Symptoms include cough, 8 days of fever of 102.9, mildly improved today, chills, nausea when smelling food, diarrhea, rhinorrhea, intermittent headache, fatigue, blurred vision, dizziness, generalized weakness, and fatigue. He had back pain which has improved. There is chest tightness and dyspnea worse with exertion and talking. Patient has had a 10 pound weight loss although wife is pushing food and fluids. Wife reports he does have a significant decrease in food intake. Wife is mainly concerned due to his hypoxia. His pulse ox goes down to 81% with exertion. After his shower 1 day he had significant respiratory distress. Lowest pulse ox she has noted was 79%. Patient has been laying around and wife is concerned for blood clot. Also of concern is patient has sleep apnea. Wife has checked his pulse ox at night while using his CPAP and it ranged from 84 to 88%. Patient denies vomiting. Yesterday patient saw Dr. Litzy Low in [place] at the [facility] spa. He was given Vitamin C 50 g IV, vitamin D 50,000 IU injection, dexamethasone IV, vitamin D 25 hydroxy, ultraviolet blood irradiation, Napier cocktail, and nebulizer with supplies. Prescriptions for ivermectin and Z-Adrian were sent to the pharmacy.   Patient felt quite well yesterday after the infusion but then felt quite ill again daily       ALLERGIES     is allergic to codeine. FAMILY HISTORY     He indicated that his mother is alive. He indicated that his father is . He indicated that his maternal grandfather is . family history includes Cancer in his father and maternal grandfather. SOCIAL HISTORY    reports that he quit smoking about 5 years ago. He has never used smokeless tobacco. He reports that he does not drink alcohol and does not use drugs. PHYSICAL EXAM     INITIAL VITALS:  weight is 201 lb (91.2 kg). His oral temperature is 100.8 °F (38.2 °C). His blood pressure is 116/73 and his pulse is 78. His respiration is 20 and oxygen saturation is 91%. Physical Exam  Constitutional:       Appearance: Normal appearance. He is well-developed. He is ill-appearing. He is not diaphoretic. HENT:      Head: Normocephalic and atraumatic. Right Ear: Hearing normal.      Left Ear: Hearing normal.      Nose: Nose normal. No rhinorrhea. Mouth/Throat:      Lips: Pink. Mouth: Mucous membranes are moist.      Pharynx: Oropharynx is clear. Eyes:      General: Lids are normal. No scleral icterus. Extraocular Movements: Extraocular movements intact. Conjunctiva/sclera: Conjunctivae normal.      Pupils: Pupils are equal, round, and reactive to light. Neck:      Trachea: Trachea normal.   Cardiovascular:      Rate and Rhythm: Normal rate and regular rhythm. Heart sounds: Normal heart sounds. No murmur heard. Pulmonary:      Effort: Pulmonary effort is normal.      Breath sounds: Normal breath sounds and air entry. No decreased breath sounds, wheezing or rhonchi. Abdominal:      General: There is no distension. Palpations: Abdomen is soft. Tenderness: There is no abdominal tenderness. Musculoskeletal:      Cervical back: Normal range of motion and neck supple. No rigidity. Comments:  Well perfused; movement normal as observed; no signs of DVT   Lymphadenopathy: Cervical: No cervical adenopathy. Skin:     General: Skin is warm and dry. Findings: No rash. Neurological:      General: No focal deficit present. Mental Status: He is alert. Sensory: Sensation is intact. Motor: Motor function is intact. Gait: Gait is intact. Psychiatric:         Mood and Affect: Mood normal.         Speech: Speech normal.         Behavior: Behavior is cooperative. DIFFERENTIAL DIAGNOSIS:   Including but not limited to: PE, Covid pneumonia, SHERRY, respiratory failure, sepsis    DIAGNOSTIC RESULTS     EKG: All [facility] who either signs or Co-signs this chart in the absence of a cardiologist.  None    RADIOLOGY: non-plain film images(s) such as CT,Ultrasound and MRI are read by the radiologist.  Plain radiographic images are visualized and preliminarily interpreted by the emergency physician unless otherwise stated below. CTA CHEST W WO CONTRAST   Final Result       1. Extensive bilateral pneumonia. 2. Splenomegaly. 3. No evidence of a pulmonary embolism. **This report has been created using voice recognition software. It may contain minor errors which are inherent in voice recognition technology. **      Final report electronically signed by Dr Jez Redd on 11/20/2021 9:51 PM          LABS:   Labs Reviewed   CBC WITH AUTO DIFFERENTIAL - Abnormal; Notable for the following components:       Result Value    Lymphocytes Absolute 0.6 (*)     Monocytes Absolute 0.3 (*)     All other components within normal limits   COMPREHENSIVE METABOLIC PANEL - Abnormal; Notable for the following components:    Glucose 121 (*)     AST 53 (*)     Total Protein 6.0 (*)     All other components within normal limits   FERRITIN - Abnormal; Notable for the following components:    Ferritin 1,767 (*)     All other components within normal limits   C-REACTIVE PROTEIN - Abnormal; Notable for the following components: CRP 6.24 (*)     All other components within normal limits   LACTATE DEHYDROGENASE - Abnormal; Notable for the following components:     (*)     All other components within normal limits   PROCALCITONIN - Abnormal; Notable for the following components:    Procalcitonin 0.50 (*)     All other components within normal limits   CULTURE, BLOOD 1   CULTURE, BLOOD 2   TSH WITH REFLEX   LACTIC ACID, PLASMA   TROPONIN   ANION GAP   OSMOLALITY   GLOMERULAR FILTRATION RATE, ESTIMATED   BASIC METABOLIC PANEL W/ REFLEX TO MG FOR LOW K   CBC WITH AUTO DIFFERENTIAL       EMERGENCY DEPARTMENT COURSE:   Vitals:    Vitals:    11/21/21 0023 11/21/21 0157 11/21/21 0224 11/21/21 0309   BP:  115/63  116/73   Pulse:  84  78   Resp:  20 20 20   Temp: 100.8 °F (38.2 °C)      TempSrc: Oral      SpO2:  (!) 89% 93% 91%   Weight:           Patient was seen in the emergency department during the global pandemic, when there was surge capacity and regional healthcare crisis. MDM:  The patient was seen by me in the emergency room for dyspnea and hypoxia in the setting of Covid. Physical exam revealed an ill-appearing 51-year-old gentleman with labored breathing but no acute respiratory distress. Lungs were essentially clear to auscultation bilaterally. Vital signs reviewed and noted stable although patient was hypoxic on room air. Old records were reviewed. Appropriate laboratory and imaging studies were ordered and reviewed upon completion. Pertinent findings: CTA of the chest reveals extensive bilateral pneumonia but no PE; ferritin 1767, CRP 6.24, pro-Ángel 0.5, . Interventions: Saline, GI cocktail, oxygen 3 L which improved patient's pulse ox. (Later high flow and high-dose Decadron given.) I discussed this patient with my attending physician, Dr. Manny Martini, who aided in medical decision making. Reexamination: Patient remained stable. Vital signs remained stable. Patient oxygen desaturated while here.   He required 6 L then a nonrebreather. Patient was placed on high flow and high-dose Decadron administered. Results were discussed with the patient, wife, and daughter as well as desire for admission and they were amenable. Dr. Anastasia Garza of our hospitalists' service was consulted and graciously accepted admission. The patient was admitted to the hospital in fair condition. CRITICAL CARE:   During my workup of this patient, it did become clear to me the critical nature of this patient's illness which did require my constant attention, and a critical care time 30 minutes was given    CONSULTS:  2256: Dr. Sulma Golden (hospitalist)    PROCEDURES:  None    FINAL IMPRESSION      1. Pneumonia due to COVID-19 virus    2. Dyspnea and respiratory abnormalities    3. Hypoxia          DISPOSITION/PLAN     1. Pneumonia due to COVID-19 virus    2. Dyspnea and respiratory abnormalities    3.  Hypoxia    Admission      (Please note that portions of this note were completed with a voice recognition program.  Efforts were made to edit the dictations but occasionally words are mis-transcribed.)    Randolph Caraballo PA-C 11/21/21 5:15 AM    ARNAV Baeza PA-C  11/21/21 5178

## 2021-11-21 NOTE — ED NOTES
Patient EKG shown to Dr. Ирина Oleary. ER charge nurse to notify hospitalist of results. No new orders received.      Giuliana Ryan RN  11/21/21 3516

## 2021-11-21 NOTE — ED NOTES
Pt oxygen sat was dropping, pt put on nonrebreather. VSS, RR is unlabored on mask. Denies any needs at this time.       Melida Christianson RN  11/20/21 9339

## 2021-11-21 NOTE — ED NOTES
Niki EM messaged about blood cultures before antibiotics. Message read with no reply. Will try again.       Katharine Thomas RN  11/21/21 5536

## 2021-11-21 NOTE — PROGRESS NOTES
Patient does not meet criteria for tocilizumab due to CRP <7.5. Notified BLANCA Mayfield    Tocilizumab (Actemra) Criteria for Use    Age > 3years old  Clinical Status - Requires high flow nasal cannula, non-invasive or invasive mechanical ventilation and to be given within 24 hours of vital (respiratory/cardiovascular) organ support initiation due to COVID-19 or rapidly increasing oxygen requirements  Concomitant Therapy - Receiving systemic corticosteroids  Laboratory - CRP > 7.5 and positive COVID-19 test within 14 days of admission    Special Considerations (that warrant provider discussion): Invasive active mycobacterial or fungal infection  Platelets < 260,082 or active bleeding  Not receiving systemic steroids  Significant immunosuppression (either drug-related or disease-related)  Elevated AST/ALT > 10 x ULN  Receiving baricitinib (use together is not recommended unless in a clinical trial)    Weight-based Tocilizumab Dosing for IV Vial  (One dose max)  800 mg Weight > 90 kg x 1 dose   600 mg Weight > 65 kg to < 90 kg x 1 dose   400 mg Weight > 40 kg to < 65 kg x 1 dose   300 mg Weight 31 kg to 40 kg x 1 dose   12 mg/kg Weight < 30 kg x 1 dose     Weight-based Tocilizumab Dosing for SQ Syringe (One dose max)  810 mg (5 syr) Weight > 90 kg x 1 dose   648 mg (4 syr) Weight > 65 kg to < 90 kg x 1 dose   486 mg (3 syr) Weight > 40 kg to < 65 kg x 1 dose   324 mg (2 syr) Weight < 40 kg x 1 dose       Fartun Vazquez, BCPS, BCCCP 11/21/2021 8:03 AM

## 2021-11-21 NOTE — ED NOTES
Patient resting on cart with family at bedside with CPAP in place. Patient denies pain or needs at this time. Patient updated on plan of care. Respirations regular, Hot meal tray ordered for patient.        Kemar Palacio RN  11/21/21 3891

## 2021-11-21 NOTE — ED NOTES
Patient updated on plan of care. Patient denies further needs at this time. CPAP intact. Call light in reach.      Jannette Mejias RN  11/21/21 4614

## 2021-11-21 NOTE — ED NOTES
Pt ambulated to restroom on nonrebreather, gate was steady oxygen dropped in 70s while walking. Moved to an inpatient bed for comfort. VSS, Call light in reach. Denies any needs at this time.       Cata Perez RN  11/21/21 4178

## 2021-11-21 NOTE — ED NOTES
ED to inpatient nurses report    Chief Complaint   Patient presents with    Shortness of Breath      Present to ED from home  LOC: alert and orientated to name, place, date  Vital signs   Vitals:    11/21/21 1351 11/21/21 1549 11/21/21 1620 11/21/21 1700   BP: 110/68 110/68  110/71   Pulse: 68 76  59   Resp: 24 22 (!) 33 (!) 32   Temp:    98.1 °F (36.7 °C)   TempSrc:    Oral   SpO2: 97% 95%  95%   Weight:          Oxygen Baseline 90%    Current needs required Room Air Bipap/Cpap Yes  LDAs:   Peripheral IV 11/20/21 Distal; Left Upper arm (Active)   Site Assessment Clean; Dry; Intact 11/21/21 1702   Line Status Normal saline locked 11/21/21 1702     Mobility: Independent  Pending ED orders: None  Present condition: Stable  Person of contract on chart  Our promise was given to patient    Electronically signed by Elio Hoyt RN on 11/21/2021 at 6:22 PM       Caitlin Rodriguez RN  11/21/21 6580

## 2021-11-21 NOTE — PROGRESS NOTES
Baricitinib Initiation Note    This patient meets criteria for baricitinib. Criteria:  Age 2 years or greater  COVID-19 positive & hospitalized  Requiring supplemental oxygen, high flow nasal cannula, invasive or non-invasive ventilation, or ECMO  Any elevation in any of the following: CRP, D-dimer, ferritin, or LDH  Must also be on dexamethasone     Exclusions: If patient already received tocilizumab (due to long half-life)  Patients with active TB    Special Considerations (that warrant provider discussion): Active DVT or PE  Pregnancy  Severe hepatic impairment  Chronic/recurrent infections  Hemoglobin < 8.0  Chronically immunosuppressed patients (drug or disease etiology)    eGFR:  Recent Labs     11/20/21 2111 11/21/21  0639   LABGLOM >90 >90      Plan:  Start baricitinib 4 mg once daily. EUA Adult Dosing Reference for COVID-19 (FDA 2021):  eGFR ?60: No dosage adjustment necessary. eGFR 30 to <60: 2 mg once daily. eGFR 15 to <30: 1 mg once daily. eGFR <15: Use is not recommended. *Dosing is different for ages 3to 5years old. (see Kayley)*      Emily Marinelli [facility] Christiano. BRITTANI, BCPS, BCCCP 11/21/2021 8:17 AM

## 2021-11-21 NOTE — ED NOTES
Molly Stylesa-Radha about blood cultures before giving antibiotics. Message was read with no reply at this time.       Anisha Nieto RN  11/21/21 4261

## 2021-11-21 NOTE — ED NOTES
Pt resting in bed, eyes closed. VSS, RR is regular and unlabored on BiPAP. No signs of distress. Call light in reach. Bed rails up x 2. Family at bedside, denies any needs at this time.            Neena Jerome RN  11/21/21 2543

## 2021-11-21 NOTE — ED NOTES
Patient resting on cart with CPAP in place. Patient and family updated on plan of care. Patient denies pain. Patient provided breakfast tray. Patient instructed to replace mask after eating. Respirations slightly labored.      Armani Hilton RN  11/21/21 9232

## 2021-11-21 NOTE — ED NOTES
ED nurse-to-nurse bedside report    Chief Complaint   Patient presents with    Shortness of Breath      LOC: alert and orientated to name, place, date  Vital signs   Vitals:    11/21/21 0451 11/21/21 0523 11/21/21 0611 11/21/21 0652   BP:  (!) 156/68 111/66    Pulse:  70 54    Resp: 27 20 20    Temp:  98.3 °F (36.8 °C)     TempSrc:  Oral     SpO2:  97% 95% 97%   Weight:          Pain:    Pain Interventions: none  Pain Goal: none  Oxygen: Yes    Current needs required BiPAP with 70% oxygen    Telemetry: Yes  LDAs:   Peripheral IV 11/20/21 Distal; Left Upper arm (Active)     Continuous Infusions:    sodium chloride       Mobility: Requires assistance * 1  [place] Fall Risk Score: No flowsheet data found.   Fall Interventions: call light in reach, Rails up x 2   Report given to: Valeri Vo RN  11/21/21 1494

## 2021-11-21 NOTE — ED NOTES
Pt called out, he has redness, itching and burning up the arm into the shoulder. He also got diaphoretic. Antibiotics stopped. Provider notified.       Cata Perez RN  11/21/21 9219

## 2021-11-21 NOTE — ED NOTES
Patient resting on cart with complaint of heartburn, stating he would like to have a GI cocktail or something. Patient updated on plan of care. CPAP intact. Family at bedside. Respirations regular.      All Daniels RN  11/21/21 7691

## 2021-11-22 LAB
ANION GAP SERPL CALCULATED.3IONS-SCNC: 13 MEQ/L (ref 8–16)
ATYPICAL LYMPHOCYTES: ABNORMAL %
AVERAGE GLUCOSE: 108 MG/DL (ref 70–126)
BASOPHILS # BLD: 0.1 %
BASOPHILS ABSOLUTE: 0 THOU/MM3 (ref 0–0.1)
BUN BLDV-MCNC: 20 MG/DL (ref 7–22)
C-REACTIVE PROTEIN: 2.88 MG/DL (ref 0–1)
CALCIUM SERPL-MCNC: 8.4 MG/DL (ref 8.5–10.5)
CHLORIDE BLD-SCNC: 107 MEQ/L (ref 98–111)
CO2: 24 MEQ/L (ref 23–33)
CREAT SERPL-MCNC: 0.9 MG/DL (ref 0.4–1.2)
EOSINOPHIL # BLD: 0 %
EOSINOPHILS ABSOLUTE: 0 THOU/MM3 (ref 0–0.4)
ERYTHROCYTE [DISTWIDTH] IN BLOOD BY AUTOMATED COUNT: 13 % (ref 11.5–14.5)
ERYTHROCYTE [DISTWIDTH] IN BLOOD BY AUTOMATED COUNT: 42.5 FL (ref 35–45)
FERRITIN: 1503 NG/ML (ref 22–322)
GFR SERPL CREATININE-BSD FRML MDRD: > 90 ML/MIN/1.73M2
GLUCOSE BLD-MCNC: 145 MG/DL (ref 70–108)
HBA1C MFR BLD: 5.6 % (ref 4.4–6.4)
HCT VFR BLD CALC: 44.3 % (ref 42–52)
HEMOGLOBIN: 14.9 GM/DL (ref 14–18)
IMMATURE GRANS (ABS): 0.05 THOU/MM3 (ref 0–0.07)
IMMATURE GRANULOCYTES: 0.6 %
LD: 1001 U/L (ref 100–190)
LYMPHOCYTES # BLD: 10 %
LYMPHOCYTES ABSOLUTE: 0.9 THOU/MM3 (ref 1–4.8)
MCH RBC QN AUTO: 29.9 PG (ref 26–33)
MCHC RBC AUTO-ENTMCNC: 33.6 GM/DL (ref 32.2–35.5)
MCV RBC AUTO: 89 FL (ref 80–94)
MONOCYTES # BLD: 2.9 %
MONOCYTES ABSOLUTE: 0.3 THOU/MM3 (ref 0.4–1.3)
NUCLEATED RED BLOOD CELLS: 0 /100 WBC
PLATELET # BLD: 175 THOU/MM3 (ref 130–400)
PLATELET ESTIMATE: ADEQUATE
PMV BLD AUTO: 11.5 FL (ref 9.4–12.4)
POTASSIUM SERPL-SCNC: 4.6 MEQ/L (ref 3.5–5.2)
RBC # BLD: 4.98 MILL/MM3 (ref 4.7–6.1)
SARS-COV-2, NAAT: NOT  DETECTED
SCAN OF BLOOD SMEAR: NORMAL
SEG NEUTROPHILS: 86.4 %
SEGMENTED NEUTROPHILS ABSOLUTE COUNT: 7.7 THOU/MM3 (ref 1.8–7.7)
SODIUM BLD-SCNC: 144 MEQ/L (ref 135–145)
WBC # BLD: 8.9 THOU/MM3 (ref 4.8–10.8)

## 2021-11-22 PROCEDURE — 80048 BASIC METABOLIC PNL TOTAL CA: CPT

## 2021-11-22 PROCEDURE — 85025 COMPLETE CBC W/AUTO DIFF WBC: CPT

## 2021-11-22 PROCEDURE — 6360000002 HC RX W HCPCS: Performed by: PHYSICIAN ASSISTANT

## 2021-11-22 PROCEDURE — 6370000000 HC RX 637 (ALT 250 FOR IP): Performed by: HOSPITALIST

## 2021-11-22 PROCEDURE — 86140 C-REACTIVE PROTEIN: CPT

## 2021-11-22 PROCEDURE — 82728 ASSAY OF FERRITIN: CPT

## 2021-11-22 PROCEDURE — 6370000000 HC RX 637 (ALT 250 FOR IP): Performed by: PHYSICIAN ASSISTANT

## 2021-11-22 PROCEDURE — 87635 SARS-COV-2 COVID-19 AMP PRB: CPT

## 2021-11-22 PROCEDURE — 94761 N-INVAS EAR/PLS OXIMETRY MLT: CPT

## 2021-11-22 PROCEDURE — 36415 COLL VENOUS BLD VENIPUNCTURE: CPT

## 2021-11-22 PROCEDURE — 83615 LACTATE (LD) (LDH) ENZYME: CPT

## 2021-11-22 PROCEDURE — 94660 CPAP INITIATION&MGMT: CPT

## 2021-11-22 PROCEDURE — 2580000003 HC RX 258: Performed by: PHYSICIAN ASSISTANT

## 2021-11-22 PROCEDURE — 2060000000 HC ICU INTERMEDIATE R&B

## 2021-11-22 PROCEDURE — 99232 SBSQ HOSP IP/OBS MODERATE 35: CPT | Performed by: HOSPITALIST

## 2021-11-22 PROCEDURE — 83036 HEMOGLOBIN GLYCOSYLATED A1C: CPT

## 2021-11-22 PROCEDURE — 2700000000 HC OXYGEN THERAPY PER DAY

## 2021-11-22 RX ORDER — LORAZEPAM 0.5 MG/1
1 TABLET ORAL EVERY 4 HOURS PRN
Status: DISCONTINUED | OUTPATIENT
Start: 2021-11-22 | End: 2021-11-24 | Stop reason: HOSPADM

## 2021-11-22 RX ORDER — LANOLIN ALCOHOL/MO/W.PET/CERES
3 CREAM (GRAM) TOPICAL NIGHTLY PRN
Status: DISCONTINUED | OUTPATIENT
Start: 2021-11-22 | End: 2021-11-22

## 2021-11-22 RX ORDER — LANOLIN ALCOHOL/MO/W.PET/CERES
6 CREAM (GRAM) TOPICAL NIGHTLY PRN
Status: DISCONTINUED | OUTPATIENT
Start: 2021-11-22 | End: 2021-11-24 | Stop reason: HOSPADM

## 2021-11-22 RX ADMIN — Medication 1000 UNITS: at 08:41

## 2021-11-22 RX ADMIN — SODIUM CHLORIDE, PRESERVATIVE FREE 10 ML: 5 INJECTION INTRAVENOUS at 08:42

## 2021-11-22 RX ADMIN — CEFTRIAXONE SODIUM 1000 MG: 1 INJECTION, POWDER, FOR SOLUTION INTRAMUSCULAR; INTRAVENOUS at 00:03

## 2021-11-22 RX ADMIN — ENOXAPARIN SODIUM 40 MG: 100 INJECTION SUBCUTANEOUS at 11:38

## 2021-11-22 RX ADMIN — ANTACID TABLETS 500 MG: 500 TABLET, CHEWABLE ORAL at 21:35

## 2021-11-22 RX ADMIN — ASPIRIN 81 MG CHEWABLE TABLET 81 MG: 81 TABLET CHEWABLE at 08:41

## 2021-11-22 RX ADMIN — BARICITINIB 4 MG: 2 TABLET, FILM COATED ORAL at 08:41

## 2021-11-22 RX ADMIN — OXYCODONE HYDROCHLORIDE AND ACETAMINOPHEN 500 MG: 500 TABLET ORAL at 08:41

## 2021-11-22 RX ADMIN — SODIUM CHLORIDE, PRESERVATIVE FREE 10 ML: 5 INJECTION INTRAVENOUS at 21:27

## 2021-11-22 RX ADMIN — LEVOTHYROXINE SODIUM 25 MCG: 0.03 TABLET ORAL at 08:41

## 2021-11-22 RX ADMIN — Medication 50 MG: at 08:41

## 2021-11-22 RX ADMIN — ENOXAPARIN SODIUM 40 MG: 100 INJECTION SUBCUTANEOUS at 00:04

## 2021-11-22 RX ADMIN — ONDANSETRON 4 MG: 2 INJECTION INTRAMUSCULAR; INTRAVENOUS at 23:03

## 2021-11-22 RX ADMIN — DEXAMETHASONE SODIUM PHOSPHATE 20 MG: 4 INJECTION INTRA-ARTICULAR; INTRALESIONAL; INTRAMUSCULAR; INTRAVENOUS; SOFT TISSUE at 21:27

## 2021-11-22 RX ADMIN — SODIUM CHLORIDE 25 ML: 9 INJECTION, SOLUTION INTRAVENOUS at 00:02

## 2021-11-22 ASSESSMENT — PAIN SCALES - GENERAL
PAINLEVEL_OUTOF10: 0
PAINLEVEL_OUTOF10: 0

## 2021-11-22 NOTE — PROGRESS NOTES
Infection control called and said there was no positive COVID-19 test for this patient is the system. ED physician admission note said patient tested positive twice at [facility] for COVID-19. This RN called [facility] and was transferred to [facility] where they said they would fax over the Positive COVID-19 test results.

## 2021-11-22 NOTE — PROGRESS NOTES
Infectious Disease called to inform nurse that there was not a covid test performed at [facility] . Nurse collected covid swab. Covid swab came back negative. Provider notified. Wife and pt. Stated that pt. Was tested with a home rapid test on 11/9 and the test was positive. Per pt. Provider the pt. Is to stay in isolation 20 days from positive home covid test.     Also updated wife on pt. Status.

## 2021-11-22 NOTE — PROGRESS NOTES
Call placed to patient's wife, Callie Oviedo. Updated her on patient's night. Questions answered appropriately. Wife voices concern over patient not eating and RN reassured that we will encourage him to eat throughout the day.

## 2021-11-22 NOTE — FLOWSHEET NOTE
11/22/21 0135   Provider Notification   Reason for Communication Evaluate  (HR in the 45s)   Provider Name Alondra Suarez   Provider Notification Advance Practice Clinician (CNS/NP/CNM/CRNA/PA)   Method of Communication Secure Message   Response No new orders   Notification Time [phone]

## 2021-11-22 NOTE — PROGRESS NOTES
Hospitalist Progress Note    Patient:  Viky Stager      Unit/Bed:6A-18/018-A    YOB: 1980    MRN: [id number]       Acct: [de-identified]     PCP: Alan Ca MD    Date of Admission: 11/20/2021      Subjective: Patient seen and examined. Patient notes that he is tolerating the BiPAP better than he tolerated high flow. Patient does use a CPAP at home and reports that the BiPAP feels more comfortable. Patient states that he felt like he was drowning when high flow was in place. No fever, no chills, no nausea, no vomiting. Medications:  Reviewed    Infusion Medications    sodium chloride       Scheduled Medications    baricitinib  4 mg Oral Daily    levothyroxine  25 mcg Oral Daily    sodium chloride flush  5-40 mL IntraVENous 2 times per day    enoxaparin  40 mg SubCUTAneous Q12H    aspirin  81 mg Oral Daily    Vitamin D  1,000 Units Oral Daily    ascorbic acid  500 mg Oral Daily    zinc sulfate  50 mg Oral Daily    dexamethasone  20 mg IntraVENous Nightly    Followed by   Darryle Norris ON 11/25/2021] dexamethasone  10 mg IntraVENous Nightly    azithromycin  500 mg IntraVENous Q24H    cefTRIAXone (ROCEPHIN) IV  1,000 mg IntraVENous Q24H     PRN Meds: diphenhydrAMINE, calcium carbonate, sodium chloride flush, sodium chloride, ondansetron **OR** ondansetron, polyethylene glycol, acetaminophen **OR** acetaminophen, potassium chloride **OR** potassium alternative oral replacement **OR** potassium chloride, magnesium sulfate, albuterol    No intake or output data in the 24 hours ending 11/21/21 2105    Diet:  ADULT DIET; Regular    Exam:  /71   Pulse (!) 49   Temp 98.2 °F (36.8 °C) (Oral)   Resp 20   Ht 5' 6\" (1.676 m)   Wt 201 lb (91.2 kg)   SpO2 93%   BMI 32.44 kg/m²   Physical Exam  Constitutional:       Appearance: Normal appearance. HENT:      Head: Normocephalic and atraumatic.       Right Ear: External ear normal.      Left Ear: External ear normal.      Nose: Nose normal.      Mouth/Throat:      Pharynx: Oropharynx is clear. Eyes:      Extraocular Movements: Extraocular movements intact. Pupils: Pupils are equal, round, and reactive to light. Cardiovascular:      Rate and Rhythm: Normal rate and regular rhythm. Pulses: Normal pulses. Heart sounds: Normal heart sounds. No murmur heard. No friction rub. No gallop. Pulmonary:      Effort: Pulmonary effort is normal. No respiratory distress. Breath sounds: Normal breath sounds. No wheezing, rhonchi or rales. Comments: BiPAP in place  Abdominal:      General: Bowel sounds are normal. There is no distension. Tenderness: There is no abdominal tenderness. Musculoskeletal:         General: No swelling. Normal range of motion. Cervical back: Normal range of motion and neck supple. Skin:     General: Skin is warm. Neurological:      General: No focal deficit present. Mental Status: He is alert and oriented to person, place, and time. Labs:   Recent Labs     11/20/21 2111 11/21/21  0639   WBC 8.3 8.2   HGB 14.9 14.1   HCT 42.6 39.8*    171     Recent Labs     11/20/21 2111 11/21/21  0639    141   K 3.8 4.1    107   CO2 26 24   BUN 21 19   CREATININE 0.9 0.9   CALCIUM 8.7 8.0*     Recent Labs     11/20/21 2111   AST 53*   ALT 36   BILITOT 0.4   ALKPHOS 73     No results for input(s): INR in the last 72 hours. No results for input(s): Pantera Pears in the last 72 hours. Urinalysis:      Lab Results   Component Value Date    NITRU Negative 09/17/2018    BLOODU Negative 09/17/2018    GLUCOSEU Negative 09/17/2018       Radiology:  CTA CHEST W WO CONTRAST   Final Result       1. Extensive bilateral pneumonia. 2. Splenomegaly. 3. No evidence of a pulmonary embolism. **This report has been created using voice recognition software. It may contain minor errors which are inherent in voice recognition technology. **      Final report electronically signed by Dr Susana Patel on 11/20/2021 9:51 PM          Diet: ADULT DIET; Regular    DVT prophylaxis: [x] Lovenox                                 [] SCDs                                 [] SQ Heparin                                 [] Encourage ambulation           [] Already on Anticoagulation     Disposition:    [x] Home       [] TCU       [] Rehab       [] Psych       [] SNF       [] [facility]       [] Other-    Code Status: Full Code    PT/OT Eval Status: None ordered    Assessment/Plan:    Anticipated Discharge in : To be determined    Active Hospital Problems    Diagnosis Date Noted    Pneumonia due to COVID-19 virus [U07.1, J12.82] 11/21/2021     Priority: High    Community acquired pneumonia [J18.9] 11/21/2021     Priority: High    Acute hypoxemic respiratory failure due to COVID-19 ([facility] [address] [U07.1, J96.01] 11/20/2021     Priority: High    Hyperglycemia [R73.9] 11/21/2021     Priority: Medium    Acquired hypothyroidism [E03.9] 11/21/2021     Priority: Low       1. Acute respiratory failure with hypoxia-patient found to have an oxygen saturation of 88% on 3 L of oxygen in the ED. Patient oxygen needed to be turned up through his stay in the ED. Patient noted to be on BiPAP on 11/21/2021. Patient will be weaned down off of oxygen as able. 2. Pneumonia secondary to COVID-19-patient presents with shortness of breath x10 days. Patient found to be significantly hypoxic with oxygen saturations of 79% on room air. While in the ED, patient noted to be placed on BiPAP. Patient currently on dexamethasone 20 mg IV push daily x5 days, which will be followed by 10 mg IV push x5 days. Patient also on baricitinib. Patient will also be provided with vitamin supplements which will include vitamin C, vitamin D, zinc.  We will follow patient's inflammatory markers during hospitalization.   3. Community-acquired pneumonia-patient started on Rocephin and Zithromax empirically for possible superinfection of bacterial pneumonia. Patient does have a slightly elevated procalcitonin. We will follow-up blood cultures. 4. Hyperglycemia-patient noted to have a blood sugar of 121 on blood work initially done in the ED. Patient's blood sugar has increased. This may be secondary to underlying dexamethasone. We will check a hemoglobin A1c.  5. Acquired hypothyroidism-patient with history of hypothyroidism. Patient will be continued on his home Synthroid dose.         Electronically signed by Kayden Burton MD on 11/21/2021 at 9:05 PM

## 2021-11-23 PROBLEM — R00.1 BRADYCARDIA: Status: ACTIVE | Noted: 2021-11-23

## 2021-11-23 LAB
ANION GAP SERPL CALCULATED.3IONS-SCNC: 13 MEQ/L (ref 8–16)
BUN BLDV-MCNC: 24 MG/DL (ref 7–22)
CALCIUM SERPL-MCNC: 8.3 MG/DL (ref 8.5–10.5)
CHLORIDE BLD-SCNC: 106 MEQ/L (ref 98–111)
CO2: 23 MEQ/L (ref 23–33)
CREAT SERPL-MCNC: 0.8 MG/DL (ref 0.4–1.2)
EKG ATRIAL RATE: 47 BPM
EKG P AXIS: 33 DEGREES
EKG P-R INTERVAL: 124 MS
EKG Q-T INTERVAL: 490 MS
EKG QRS DURATION: 86 MS
EKG QTC CALCULATION (BAZETT): 433 MS
EKG R AXIS: 57 DEGREES
EKG T AXIS: 46 DEGREES
EKG VENTRICULAR RATE: 47 BPM
GFR SERPL CREATININE-BSD FRML MDRD: > 90 ML/MIN/1.73M2
GLUCOSE BLD-MCNC: 142 MG/DL (ref 70–108)
MAGNESIUM: 2.5 MG/DL (ref 1.6–2.4)
POTASSIUM SERPL-SCNC: 4.5 MEQ/L (ref 3.5–5.2)
SODIUM BLD-SCNC: 142 MEQ/L (ref 135–145)
TROPONIN T: < 0.01 NG/ML

## 2021-11-23 PROCEDURE — 99232 SBSQ HOSP IP/OBS MODERATE 35: CPT | Performed by: HOSPITALIST

## 2021-11-23 PROCEDURE — 84484 ASSAY OF TROPONIN QUANT: CPT

## 2021-11-23 PROCEDURE — 94660 CPAP INITIATION&MGMT: CPT

## 2021-11-23 PROCEDURE — 6370000000 HC RX 637 (ALT 250 FOR IP): Performed by: HOSPITALIST

## 2021-11-23 PROCEDURE — 2700000000 HC OXYGEN THERAPY PER DAY

## 2021-11-23 PROCEDURE — 80048 BASIC METABOLIC PNL TOTAL CA: CPT

## 2021-11-23 PROCEDURE — 6370000000 HC RX 637 (ALT 250 FOR IP): Performed by: PHYSICIAN ASSISTANT

## 2021-11-23 PROCEDURE — 2580000003 HC RX 258: Performed by: PHYSICIAN ASSISTANT

## 2021-11-23 PROCEDURE — 6360000002 HC RX W HCPCS: Performed by: PHYSICIAN ASSISTANT

## 2021-11-23 PROCEDURE — 93005 ELECTROCARDIOGRAM TRACING: CPT

## 2021-11-23 PROCEDURE — 36415 COLL VENOUS BLD VENIPUNCTURE: CPT

## 2021-11-23 PROCEDURE — 2060000000 HC ICU INTERMEDIATE R&B

## 2021-11-23 PROCEDURE — 83735 ASSAY OF MAGNESIUM: CPT

## 2021-11-23 RX ORDER — CALCIUM CARBONATE 200(500)MG
1000 TABLET,CHEWABLE ORAL 3 TIMES DAILY PRN
Status: DISCONTINUED | OUTPATIENT
Start: 2021-11-23 | End: 2021-11-24 | Stop reason: HOSPADM

## 2021-11-23 RX ORDER — MAGNESIUM HYDROXIDE/ALUMINUM HYDROXICE/SIMETHICONE 120; 1200; 1200 MG/30ML; MG/30ML; MG/30ML
30 SUSPENSION ORAL EVERY 6 HOURS PRN
Status: DISCONTINUED | OUTPATIENT
Start: 2021-11-23 | End: 2021-11-24 | Stop reason: HOSPADM

## 2021-11-23 RX ADMIN — DEXAMETHASONE SODIUM PHOSPHATE 20 MG: 4 INJECTION INTRA-ARTICULAR; INTRALESIONAL; INTRAMUSCULAR; INTRAVENOUS; SOFT TISSUE at 20:41

## 2021-11-23 RX ADMIN — ENOXAPARIN SODIUM 40 MG: 100 INJECTION SUBCUTANEOUS at 22:04

## 2021-11-23 RX ADMIN — CEFTRIAXONE SODIUM 1000 MG: 1 INJECTION, POWDER, FOR SOLUTION INTRAMUSCULAR; INTRAVENOUS at 22:05

## 2021-11-23 RX ADMIN — SODIUM CHLORIDE, PRESERVATIVE FREE 10 ML: 5 INJECTION INTRAVENOUS at 20:41

## 2021-11-23 RX ADMIN — Medication 50 MG: at 09:21

## 2021-11-23 RX ADMIN — LORAZEPAM 1 MG: 0.5 TABLET ORAL at 22:03

## 2021-11-23 RX ADMIN — ENOXAPARIN SODIUM 40 MG: 100 INJECTION SUBCUTANEOUS at 12:29

## 2021-11-23 RX ADMIN — ONDANSETRON 4 MG: 2 INJECTION INTRAMUSCULAR; INTRAVENOUS at 09:32

## 2021-11-23 RX ADMIN — ONDANSETRON 4 MG: 2 INJECTION INTRAMUSCULAR; INTRAVENOUS at 22:12

## 2021-11-23 RX ADMIN — ALUMINUM HYDROXIDE, MAGNESIUM HYDROXIDE, AND SIMETHICONE 30 ML: 200; 200; 20 SUSPENSION ORAL at 20:44

## 2021-11-23 RX ADMIN — LEVOTHYROXINE SODIUM 25 MCG: 0.03 TABLET ORAL at 07:15

## 2021-11-23 RX ADMIN — SODIUM CHLORIDE, PRESERVATIVE FREE 10 ML: 5 INJECTION INTRAVENOUS at 09:24

## 2021-11-23 RX ADMIN — OXYCODONE HYDROCHLORIDE AND ACETAMINOPHEN 500 MG: 500 TABLET ORAL at 09:21

## 2021-11-23 RX ADMIN — BARICITINIB 4 MG: 2 TABLET, FILM COATED ORAL at 09:21

## 2021-11-23 RX ADMIN — ASPIRIN 81 MG CHEWABLE TABLET 81 MG: 81 TABLET CHEWABLE at 09:21

## 2021-11-23 RX ADMIN — CEFTRIAXONE SODIUM 1000 MG: 1 INJECTION, POWDER, FOR SOLUTION INTRAMUSCULAR; INTRAVENOUS at 00:54

## 2021-11-23 RX ADMIN — ENOXAPARIN SODIUM 40 MG: 100 INJECTION SUBCUTANEOUS at 00:56

## 2021-11-23 RX ADMIN — ANTACID TABLETS 500 MG: 500 TABLET, CHEWABLE ORAL at 12:29

## 2021-11-23 RX ADMIN — LORAZEPAM 1 MG: 0.5 TABLET ORAL at 09:32

## 2021-11-23 RX ADMIN — Medication 1000 UNITS: at 09:21

## 2021-11-23 ASSESSMENT — PAIN SCALES - GENERAL: PAINLEVEL_OUTOF10: 0

## 2021-11-23 NOTE — FLOWSHEET NOTE
11/23/21 0154   Provider Notification   Reason for Communication Evaluate   Provider Name Corey Ibarra   Provider Notification Physician   Method of Communication Call   Response See orders   Notification Time [phone] patient heart rate dropped down into the 30s. Patient complaining of \"heart burn\" EKG obtained. Secure message sent to provider in [employer]. 0144 patient heart rate dropped to 30 and was consistently staying in the 30s. Patient currently asleep an on bipap. Provider messaged in perfect serve. [phone] with provider over the phone and received orders for labs in the morning and to keep an eye on the patient overnight.

## 2021-11-23 NOTE — PROGRESS NOTES
Hospitalist Progress Note    Patient:  Kelle Robertson      Unit/Bed:6A-18/018-A    YOB: 1980    MRN: [id number]       Acct: [de-identified]     PCP: Leticia Anderson MD    Date of Admission: 11/20/2021      Subjective: Patient seen and examined. Patient noted to be on 4 L of oxygen. Patient states that Ativan helped to calm his nerves. Patient reports that the Tums is not helping him with his acid reflux. Patient states he normally takes 2-3 Tums at a time. Patient states he was able to use BiPAP at night to sleep in place of his CPAP. No fever, no chills, no nausea, no vomiting. Patient was noted to have a heart rate in the 30s. Patient appears to have lower than normal heart rate. Medications:  Reviewed    Infusion Medications    sodium chloride Stopped (11/22/21 0036)     Scheduled Medications    baricitinib  4 mg Oral Daily    levothyroxine  25 mcg Oral Daily    sodium chloride flush  5-40 mL IntraVENous 2 times per day    enoxaparin  40 mg SubCUTAneous Q12H    aspirin  81 mg Oral Daily    Vitamin D  1,000 Units Oral Daily    ascorbic acid  500 mg Oral Daily    zinc sulfate  50 mg Oral Daily    dexamethasone  20 mg IntraVENous Nightly    Followed by   Kristy Long ON 11/25/2021] dexamethasone  10 mg IntraVENous Nightly    azithromycin  500 mg IntraVENous Q24H    cefTRIAXone (ROCEPHIN) IV  1,000 mg IntraVENous Q24H     PRN Meds: calcium carbonate, aluminum & magnesium hydroxide-simethicone, LORazepam, melatonin, diphenhydrAMINE, sodium chloride flush, sodium chloride, ondansetron **OR** ondansetron, polyethylene glycol, acetaminophen **OR** acetaminophen, potassium chloride **OR** potassium alternative oral replacement **OR** potassium chloride, magnesium sulfate, albuterol      Intake/Output Summary (Last 24 hours) at 11/23/2021 1729  Last data filed at 11/23/2021 0507  Gross per 24 hour   Intake --   Output 200 ml   Net -200 ml       Diet:  ADULT DIET;  Regular  ADULT ORAL NUTRITION SUPPLEMENT; Breakfast, Lunch, Dinner; Standard High Calorie/High Protein Oral Supplement    Exam:  /68   Pulse 56   Temp 98.4 °F (36.9 °C) (Axillary)   Resp 28   Ht 5' 6\" (1.676 m)   Wt 201 lb (91.2 kg)   SpO2 97%   BMI 32.44 kg/m²   Physical Exam  Constitutional:       Appearance: Normal appearance. HENT:      Head: Normocephalic and atraumatic. Right Ear: External ear normal.      Left Ear: External ear normal.      Nose: Nose normal.      Mouth/Throat:      Pharynx: Oropharynx is clear. Eyes:      Extraocular Movements: Extraocular movements intact. Pupils: Pupils are equal, round, and reactive to light. Cardiovascular:      Rate and Rhythm: Regular rhythm. Bradycardia present. Pulses: Normal pulses. Heart sounds: Normal heart sounds. No murmur heard. No friction rub. No gallop. Pulmonary:      Effort: Pulmonary effort is normal. No respiratory distress. Breath sounds: Normal breath sounds. No wheezing, rhonchi or rales. Comments: Nasal cannula in place  Abdominal:      General: Bowel sounds are normal. There is no distension. Tenderness: There is no abdominal tenderness. Musculoskeletal:         General: No swelling. Normal range of motion. Cervical back: Normal range of motion and neck supple. Skin:     General: Skin is warm. Neurological:      General: No focal deficit present. Mental Status: He is alert. Labs:   Recent Labs     11/20/21 2111 11/21/21  0639 11/22/21  0744   WBC 8.3 8.2 8.9   HGB 14.9 14.1 14.9   HCT 42.6 39.8* 44.3    171 175     Recent Labs     11/21/21  0639 11/22/21  0744 11/23/21  0412    144 142   K 4.1 4.6 4.5    107 106   CO2 24 24 23   BUN 19 20 24*   CREATININE 0.9 0.9 0.8   CALCIUM 8.0* 8.4* 8.3*     Recent Labs     11/20/21 2111   AST 53*   ALT 36   BILITOT 0.4   ALKPHOS 73     No results for input(s): INR in the last 72 hours.   No results for input(s): Joelle Ill in the last 72 hours. Urinalysis:      Lab Results   Component Value Date    NITRU Negative 09/17/2018    BLOODU Negative 09/17/2018    GLUCOSEU Negative 09/17/2018       Radiology:  CTA CHEST W WO CONTRAST   Final Result       1. Extensive bilateral pneumonia. 2. Splenomegaly. 3. No evidence of a pulmonary embolism. **This report has been created using voice recognition software. It may contain minor errors which are inherent in voice recognition technology. **      Final report electronically signed by Dr Katia Roberto on 11/20/2021 9:51 PM          Diet: ADULT DIET; Regular  ADULT ORAL NUTRITION SUPPLEMENT; Breakfast, Lunch, Dinner; Standard High Calorie/High Protein Oral Supplement    DVT prophylaxis: [x] Lovenox                                 [] SCDs                                 [] SQ Heparin                                 [] Encourage ambulation           [] Already on Anticoagulation     Disposition:    [x] Home       [] TCU       [] Rehab       [] Psych       [] SNF       [] [facility]       [] Other-    Code Status: Full Code    PT/OT Eval Status: None ordered    Assessment/Plan:    Anticipated Discharge in : 1 to 2 days    Active Hospital Problems    Diagnosis Date Noted    Pneumonia due to COVID-19 virus [U07.1, J12.82] 11/21/2021     Priority: High    Community acquired pneumonia [J18.9] 11/21/2021     Priority: High    Acute hypoxemic respiratory failure due to COVID-19 ([facility] [address] [U07.1, J96.01] 11/20/2021     Priority: High    Bradycardia [R00.1] 11/23/2021     Priority: Medium    Hyperglycemia [R73.9] 11/21/2021     Priority: Medium    Acquired hypothyroidism [E03.9] 11/21/2021     Priority: Low       1. Acute respiratory failure with hypoxia-patient found to have an oxygen saturation of 88% on 3 L of oxygen in the ED. Patient oxygen needed to be turned up through his stay in the ED. Patient noted to be on BiPAP on 11/21/2021.   Patient will be weaned down off of oxygen as able. · 11/22/2021-patient weaned down to high flow oxygen. · 11/23/2021-patient lying down to 4 L oxygen  2. Pneumonia secondary to COVID-19-patient presents with shortness of breath x10 days. Patient found to be significantly hypoxic with oxygen saturations of 79% on room air. While in the ED, patient noted to be placed on BiPAP. Patient currently on dexamethasone 20 mg IV push daily x5 days, which will be followed by 10 mg IV push x5 days. Patient also on baricitinib. Patient will also be provided with vitamin supplements which will include vitamin C, vitamin D, zinc.  We will follow patient's inflammatory markers during hospitalization. · 11/22/2021-CRP trending down from 6.24-2. 88. LDH trending from 877-1001. Ferritin trending from 5237-7597. 3. Community-acquired pneumonia-patient started on Rocephin and Zithromax empirically for possible superinfection of bacterial pneumonia. Patient does have a slightly elevated procalcitonin. We will follow-up blood cultures. 4. Bradycardia-patient noted to have heart rates between 40-60. Patient noted to have a heart rate at 37. Patient denies having any symptoms. We will continue to monitor. 5. Hyperglycemia-patient noted to have a blood sugar of 121 on blood work initially done in the ED. Patient's blood sugar has increased. This may be secondary to underlying dexamethasone. Hemoglobin A1c is 5.6. Elevated blood sugar is most likely secondary to steroids. 6. Acquired hypothyroidism-patient with history of hypothyroidism. Patient will be continued on his home Synthroid dose.         Electronically signed by Laz Sears MD on 11/23/2021 at 5:29 PM

## 2021-11-23 NOTE — PROGRESS NOTES
Hospitalist Progress Note    Patient:  Kitty Kaba      Unit/Bed:6A-18/018-A    YOB: 1980    MRN: [id number]       Acct: [de-identified]     PCP: Rowena Bermudez MD    Date of Admission: 11/20/2021      Subjective: Patient seen and examined. Patient states that he is tolerating the high flow oxygen on the floor better than the one that was given to him in the ED. Patient states that he typically uses a CPAP at night for the past 5 years. Patient informed that he should be able to use BiPAP at night in place of his CPAP. Patient had some concerns that this may cause him to regress with his oxygen levels. Patient reassured that using the BiPAP as he would for his CPAP would be okay. Patient reports that he is eating okay. No fever, no chills, no nausea, no vomiting.       Medications:  Reviewed    Infusion Medications    sodium chloride Stopped (11/22/21 0036)     Scheduled Medications    baricitinib  4 mg Oral Daily    levothyroxine  25 mcg Oral Daily    sodium chloride flush  5-40 mL IntraVENous 2 times per day    enoxaparin  40 mg SubCUTAneous Q12H    aspirin  81 mg Oral Daily    Vitamin D  1,000 Units Oral Daily    ascorbic acid  500 mg Oral Daily    zinc sulfate  50 mg Oral Daily    dexamethasone  20 mg IntraVENous Nightly    Followed by   Sd Evans ON 11/25/2021] dexamethasone  10 mg IntraVENous Nightly    azithromycin  500 mg IntraVENous Q24H    cefTRIAXone (ROCEPHIN) IV  1,000 mg IntraVENous Q24H     PRN Meds: LORazepam, melatonin, diphenhydrAMINE, calcium carbonate, sodium chloride flush, sodium chloride, ondansetron **OR** ondansetron, polyethylene glycol, acetaminophen **OR** acetaminophen, potassium chloride **OR** potassium alternative oral replacement **OR** potassium chloride, magnesium sulfate, albuterol      Intake/Output Summary (Last 24 hours) at 11/22/2021 2000  Last data filed at 11/22/2021 1458  Gross per 24 hour   Intake 335.83 ml   Output 500 ml   Net -164.17 ml hours. No results for input(s): Prentis Revels in the last 72 hours. Urinalysis:      Lab Results   Component Value Date    NITRU Negative 09/17/2018    BLOODU Negative 09/17/2018    GLUCOSEU Negative 09/17/2018       Radiology:  CTA CHEST W WO CONTRAST   Final Result       1. Extensive bilateral pneumonia. 2. Splenomegaly. 3. No evidence of a pulmonary embolism. **This report has been created using voice recognition software. It may contain minor errors which are inherent in voice recognition technology. **      Final report electronically signed by Dr Shaggy Acosta on 11/20/2021 9:51 PM          Diet: ADULT DIET; Regular  ADULT ORAL NUTRITION SUPPLEMENT; Breakfast, Lunch, Dinner; Standard High Calorie/High Protein Oral Supplement    DVT prophylaxis: [x] Lovenox                                 [] SCDs                                 [] SQ Heparin                                 [] Encourage ambulation           [] Already on Anticoagulation     Disposition:    [x] Home       [] TCU       [] Rehab       [] Psych       [] SNF       [] [facility]       [] Other-    Code Status: Full Code    PT/OT Eval Status: None ordered    Assessment/Plan:    Anticipated Discharge in : To be determined    Active Hospital Problems    Diagnosis Date Noted    Pneumonia due to COVID-19 virus [U07.1, J12.82] 11/21/2021     Priority: High    Community acquired pneumonia [J18.9] 11/21/2021     Priority: High    Acute hypoxemic respiratory failure due to COVID-19 ([facility] [address] [U07.1, J96.01] 11/20/2021     Priority: High    Hyperglycemia [R73.9] 11/21/2021     Priority: Medium    Acquired hypothyroidism [E03.9] 11/21/2021     Priority: Low       1. Acute respiratory failure with hypoxia-patient found to have an oxygen saturation of 88% on 3 L of oxygen in the ED. Patient oxygen needed to be turned up through his stay in the ED. Patient noted to be on BiPAP on 11/21/2021.   Patient will be weaned down off of oxygen as able. · 11/22/2021-patient weaned down to high flow oxygen. 2. Pneumonia secondary to COVID-19-patient presents with shortness of breath x10 days. Patient found to be significantly hypoxic with oxygen saturations of 79% on room air. While in the ED, patient noted to be placed on BiPAP. Patient currently on dexamethasone 20 mg IV push daily x5 days, which will be followed by 10 mg IV push x5 days. Patient also on baricitinib. Patient will also be provided with vitamin supplements which will include vitamin C, vitamin D, zinc.  We will follow patient's inflammatory markers during hospitalization. · 11/22/2021-CRP trending down from 6.24-2. 88. LDH trending from 877-1001. Ferritin trending from 6305-9642. 3. Community-acquired pneumonia-patient started on Rocephin and Zithromax empirically for possible superinfection of bacterial pneumonia. Patient does have a slightly elevated procalcitonin. We will follow-up blood cultures. 4. Hyperglycemia-patient noted to have a blood sugar of 121 on blood work initially done in the ED. Patient's blood sugar has increased. This may be secondary to underlying dexamethasone. Hemoglobin A1c is 5.6. Elevated blood sugar is most likely secondary to steroids. 5. Acquired hypothyroidism-patient with history of hypothyroidism. Patient will be continued on his home Synthroid dose.         Electronically signed by Shantal Valles MD on 11/22/2021 at 8:00 PM

## 2021-11-24 VITALS
RESPIRATION RATE: 18 BRPM | TEMPERATURE: 98.2 F | HEART RATE: 53 BPM | BODY MASS INDEX: 32.3 KG/M2 | HEIGHT: 66 IN | OXYGEN SATURATION: 92 % | WEIGHT: 201 LBS | SYSTOLIC BLOOD PRESSURE: 108 MMHG | DIASTOLIC BLOOD PRESSURE: 67 MMHG

## 2021-11-24 PROBLEM — R73.9 HYPERGLYCEMIA: Status: RESOLVED | Noted: 2021-11-21 | Resolved: 2021-11-24

## 2021-11-24 LAB
C-REACTIVE PROTEIN: 0.78 MG/DL (ref 0–1)
FERRITIN: 1444 NG/ML (ref 22–322)
LD: 921 U/L (ref 100–190)

## 2021-11-24 PROCEDURE — 82728 ASSAY OF FERRITIN: CPT

## 2021-11-24 PROCEDURE — 6360000002 HC RX W HCPCS: Performed by: PHYSICIAN ASSISTANT

## 2021-11-24 PROCEDURE — 2580000003 HC RX 258: Performed by: PHYSICIAN ASSISTANT

## 2021-11-24 PROCEDURE — 2700000000 HC OXYGEN THERAPY PER DAY

## 2021-11-24 PROCEDURE — 83615 LACTATE (LD) (LDH) ENZYME: CPT

## 2021-11-24 PROCEDURE — 94761 N-INVAS EAR/PLS OXIMETRY MLT: CPT

## 2021-11-24 PROCEDURE — 94660 CPAP INITIATION&MGMT: CPT

## 2021-11-24 PROCEDURE — 86140 C-REACTIVE PROTEIN: CPT

## 2021-11-24 PROCEDURE — 36415 COLL VENOUS BLD VENIPUNCTURE: CPT

## 2021-11-24 PROCEDURE — 6370000000 HC RX 637 (ALT 250 FOR IP): Performed by: PHYSICIAN ASSISTANT

## 2021-11-24 PROCEDURE — 99239 HOSP IP/OBS DSCHRG MGMT >30: CPT | Performed by: HOSPITALIST

## 2021-11-24 PROCEDURE — 6370000000 HC RX 637 (ALT 250 FOR IP): Performed by: HOSPITALIST

## 2021-11-24 RX ORDER — AZITHROMYCIN 500 MG/1
500 TABLET, FILM COATED ORAL DAILY
Qty: 1 PACKET | Refills: 0 | Status: ON HOLD | OUTPATIENT
Start: 2021-11-24 | End: 2021-12-03 | Stop reason: HOSPADM

## 2021-11-24 RX ORDER — PANTOPRAZOLE SODIUM 40 MG/1
40 TABLET, DELAYED RELEASE ORAL
Qty: 30 TABLET | Refills: 5 | Status: SHIPPED | OUTPATIENT
Start: 2021-11-24 | End: 2022-10-25

## 2021-11-24 RX ORDER — DEXAMETHASONE 6 MG/1
6 TABLET ORAL DAILY
Qty: 6 TABLET | Refills: 0 | Status: ON HOLD | OUTPATIENT
Start: 2021-11-24 | End: 2021-12-03 | Stop reason: HOSPADM

## 2021-11-24 RX ORDER — CALCIUM CARBONATE 200(500)MG
1000 TABLET,CHEWABLE ORAL 3 TIMES DAILY PRN
Qty: 90 TABLET | Refills: 0 | Status: SHIPPED | OUTPATIENT
Start: 2021-11-24 | End: 2021-12-24

## 2021-11-24 RX ORDER — LORAZEPAM 1 MG/1
1 TABLET ORAL EVERY 4 HOURS PRN
Qty: 15 TABLET | Refills: 0 | Status: SHIPPED | OUTPATIENT
Start: 2021-11-24 | End: 2021-12-24

## 2021-11-24 RX ORDER — ASPIRIN 81 MG/1
81 TABLET, CHEWABLE ORAL DAILY
Qty: 30 TABLET | Refills: 3 | Status: SHIPPED | OUTPATIENT
Start: 2021-11-25 | End: 2022-10-25

## 2021-11-24 RX ORDER — MAGNESIUM HYDROXIDE/ALUMINUM HYDROXICE/SIMETHICONE 120; 1200; 1200 MG/30ML; MG/30ML; MG/30ML
30 SUSPENSION ORAL EVERY 6 HOURS PRN
Qty: 710 ML | Refills: 1 | Status: SHIPPED | OUTPATIENT
Start: 2021-11-24 | End: 2022-02-07

## 2021-11-24 RX ORDER — ASCORBIC ACID 500 MG
500 TABLET ORAL DAILY
Qty: 30 TABLET | Refills: 3 | Status: SHIPPED | OUTPATIENT
Start: 2021-11-25 | End: 2022-10-25

## 2021-11-24 RX ORDER — LANOLIN ALCOHOL/MO/W.PET/CERES
6 CREAM (GRAM) TOPICAL NIGHTLY PRN
Qty: 30 TABLET | Refills: 0 | Status: SHIPPED | OUTPATIENT
Start: 2021-11-24 | End: 2022-02-07

## 2021-11-24 RX ORDER — ZINC SULFATE 50(220)MG
50 CAPSULE ORAL DAILY
Qty: 30 CAPSULE | Refills: 3 | COMMUNITY
Start: 2021-11-25 | End: 2022-10-25

## 2021-11-24 RX ADMIN — LEVOTHYROXINE SODIUM 25 MCG: 0.03 TABLET ORAL at 05:25

## 2021-11-24 RX ADMIN — ONDANSETRON 4 MG: 2 INJECTION INTRAMUSCULAR; INTRAVENOUS at 07:57

## 2021-11-24 RX ADMIN — LORAZEPAM 1 MG: 0.5 TABLET ORAL at 07:57

## 2021-11-24 RX ADMIN — LORAZEPAM 1 MG: 0.5 TABLET ORAL at 16:39

## 2021-11-24 RX ADMIN — OXYCODONE HYDROCHLORIDE AND ACETAMINOPHEN 500 MG: 500 TABLET ORAL at 07:57

## 2021-11-24 RX ADMIN — ANTACID TABLETS 1000 MG: 500 TABLET, CHEWABLE ORAL at 04:26

## 2021-11-24 RX ADMIN — SODIUM CHLORIDE, PRESERVATIVE FREE 10 ML: 5 INJECTION INTRAVENOUS at 07:58

## 2021-11-24 RX ADMIN — BARICITINIB 4 MG: 2 TABLET, FILM COATED ORAL at 07:57

## 2021-11-24 RX ADMIN — Medication 50 MG: at 07:57

## 2021-11-24 RX ADMIN — ALUMINUM HYDROXIDE, MAGNESIUM HYDROXIDE, AND SIMETHICONE 30 ML: 200; 200; 20 SUSPENSION ORAL at 05:27

## 2021-11-24 RX ADMIN — ASPIRIN 81 MG CHEWABLE TABLET 81 MG: 81 TABLET CHEWABLE at 07:57

## 2021-11-24 RX ADMIN — Medication 1000 UNITS: at 07:57

## 2021-11-24 RX ADMIN — ENOXAPARIN SODIUM 40 MG: 100 INJECTION SUBCUTANEOUS at 10:47

## 2021-11-24 ASSESSMENT — PAIN SCALES - GENERAL
PAINLEVEL_OUTOF10: 0

## 2021-11-24 NOTE — DISCHARGE SUMMARY
Discharge Summary    Patient:  Mike Douglas  YOB: 1980    MRN: [id number]   Acct: [de-identified]    Primary Care Physician: Dane Carrington MD    Admit date:  11/20/2021    Discharge date:   11/24/2021      Discharge Diagnoses:   Acute hypoxemic respiratory failure due to COVID-19 [facility])  Principal Problem:    Acute hypoxemic respiratory failure due to COVID-19 [facility])  Active Problems:    Pneumonia due to COVID-19 virus    Community acquired pneumonia    Bradycardia    Acquired hypothyroidism  Resolved Problems:    Hyperglycemia        Admitted for: (HPI)  Patient presents to the ED with progressive shortness of breath. The patient has been ill for 10 days. The patient was evaluated in an ER two times prior to his presentation here. He was discharged home despite hypoxia. The patient's wife found a holistic doctor that did a series of treatments at his office that gave the patient transient relief. Today prior to presentation the patient has oxygen saturations at 79% on room air. While in the ED the patient escalated from nasal cannula, to high flow nasal cannula to BIPAP to maintain his oxygen saturations. Hospital Course:  1. Acute respiratory failure with hypoxia-patient found to have an oxygen saturation of 88% on 3 L of oxygen in the ED. Patient oxygen needed to be turned up through his stay in the ED. Patient noted to be on BiPAP on 11/21/2021. Patient will be weaned down off of oxygen as able. · 11/22/2021-patient weaned down to high flow oxygen. · 11/23/2021-patient lying down to 4 L oxygen  · 11/24/2021-patient qualified for home oxygen with 1 L at rest at 4 L while ambulating. 2. Pneumonia secondary to COVID-19-patient presents with shortness of breath x10 days. Patient found to be significantly hypoxic with oxygen saturations of 79% on room air. While in the ED, patient noted to be placed on BiPAP.   Patient currently on dexamethasone 20 mg IV push daily x5 days, which will be followed by 10 mg IV push x5 days. Patient also on baricitinib. Patient will also be provided with vitamin supplements which will include vitamin C, vitamin D, zinc.  We will follow patient's inflammatory markers during hospitalization. · 11/22/2021-CRP trending down from 6.24-2. 88. LDH trending from 877-1001. Ferritin trending from 1672-3734. · 11/24/2021-patient discharged on decadron, Vitamin C   3. Community-acquired pneumonia-patient started on Rocephin and Zithromax empirically for possible superinfection of bacterial pneumonia. Patient does have a slightly elevated procalcitonin. We will follow-up blood cultures. · Patient discharged on Zithromax   4. Bradycardia-patient noted to have heart rates between 40-60. Patient noted to have a heart rate at 37. Patient denies having any symptoms. We will continue to monitor. · Patient's metoprolol stopped on discharge   5. Hyperglycemia-patient noted to have a blood sugar of 121 on blood work initially done in the ED. Patient's blood sugar has increased. This may be secondary to underlying dexamethasone. Hemoglobin A1c is 5.6. Elevated blood sugar is most likely secondary to steroids. 6. Acquired hypothyroidism-patient with history of hypothyroidism. Patient will be continued on his home Synthroid dose. 7. GERD-patient reports acid reflux type symptoms. Patient discharged on Maalox, Tums and Protonix. Patient states that he did not start getting intense acid reflux until he got COVID. Patient was evaluated today for the diagnosis of COVID pneumonia . I entered a DME order for home oxygen because the diagnosis and testing requires the patient to have supplemental oxygen. Condition will improve or be benefited by oxygen use. The patient is  able to perform good mobility in a home setting and therefore does require the use of a portable oxygen system.   The need for this equipment was discussed with the patient and he understands and is in agreement. Consultants:  None     Discharge Medications:       Medication List      START taking these medications    aluminum & magnesium hydroxide-simethicone 200-200-20 MG/5ML Susp suspension  Commonly known as: MAALOX  Take 30 mLs by mouth every 6 hours as needed for Indigestion     ascorbic acid 500 MG tablet  Commonly known as: VITAMIN C  Take 1 tablet by mouth daily  Start taking on: November 25, 2021     aspirin 81 MG chewable tablet  Take 1 tablet by mouth daily  Start taking on: November 25, 2021     azithromycin 500 MG tablet  Commonly known as: Zithromax  Take 1 tablet by mouth daily for 5 days     calcium carbonate 500 MG chewable tablet  Commonly known as: TUMS  Take 2 tablets by mouth 3 times daily as needed for Heartburn     dexamethasone 6 MG tablet  Commonly known as: Decadron  Take 1 tablet by mouth daily for 6 days     LORazepam 1 MG tablet  Commonly known as: ATIVAN  Take 1 tablet by mouth every 4 hours as needed for Anxiety for up to 30 days.      melatonin 3 MG Tabs tablet  Take 2 tablets by mouth nightly as needed (sleep)     pantoprazole 40 MG tablet  Commonly known as: PROTONIX  Take 1 tablet by mouth every morning (before breakfast)     zinc sulfate 220 (50 Zn) MG capsule  Commonly known as: ZINCATE  Take 1 capsule by mouth daily  Start taking on: November 25, 2021        CONTINUE taking these medications    levothyroxine 25 MCG tablet  Commonly known as: SYNTHROID     midodrine 5 MG tablet  Commonly known as: PROAMATINE     sildenafil 20 MG tablet  Commonly known as: Revatio  Take 1 tablet by mouth as needed (ED)     vitamin D 1000 UNIT Tabs tablet  Commonly known as: CHOLECALCIFEROL        STOP taking these medications    metoprolol tartrate 25 MG tablet  Commonly known as: LOPRESSOR           Where to Get Your Medications      These medications were sent to [facility] - F [phone]  [facility] [address] [facility] [address]    Phone: [phone]   · aluminum & magnesium hydroxide-simethicone 200-200-20 MG/5ML Susp suspension  · ascorbic acid 500 MG tablet  · aspirin 81 MG chewable tablet  · azithromycin 500 MG tablet  · calcium carbonate 500 MG chewable tablet  · dexamethasone 6 MG tablet  · melatonin 3 MG Tabs tablet  · pantoprazole 40 MG tablet     You can get these medications from any pharmacy    Bring a paper prescription for each of these medications  · LORazepam 1 MG tablet  You don't need a prescription for these medications  · zinc sulfate 220 (50 Zn) MG capsule           Physical Exam:    Vitals:  Vitals:    11/24/21 0900 11/24/21 1030 11/24/21 1115 11/24/21 1641   BP:   108/67 108/67   Pulse:   62 53   Resp:   18 18   Temp:   98.3 °F (36.8 °C) 98.2 °F (36.8 °C)   TempSrc:   Oral Oral   SpO2: 95% 95% 93% 92%   Weight:       Height:         Weight: Weight: 201 lb (91.2 kg)     24 hour intake/output:    Intake/Output Summary (Last 24 hours) at 11/24/2021 1719  Last data filed at 11/24/2021 0432  Gross per 24 hour   Intake --   Output 800 ml   Net -800 ml       General appearance - alert, well appearing, and in no distress  Chest - clear to auscultation, no wheezes, rales or rhonchi, symmetric air entry  Heart - normal rate, regular rhythm, normal S1, S2, no murmurs, rubs, clicks or gallops  Abdomen - soft, nontender, nondistended, no masses or organomegaly  Obese: Yes; Protuberant: No   Neurological - alert, oriented, normal speech, no focal findings or movement disorder noted  Extremities - peripheral pulses normal, no pedal edema, no clubbing or cyanosis  Skin - normal coloration and turgor, no rashes, no suspicious skin lesions noted    Procedures:  None    Diagnostic Test:  CBC, BMP, serial inflammatory markers     Radiology reports as per the Radiologist  Radiology: CTA CHEST W WO CONTRAST    Result Date: 11/20/2021  PROCEDURE: CTA CHEST W WO CONTRAST CLINICAL INFORMATION: 70-year-old male with shortness of breath; lightheaded; covid; hypoxia. COMPARISON: No prior study. TECHNIQUE: 3 mm axial images were obtained through the chest after the administration of IV contrast.  A non-contrast localizer was obtained. 3D reconstructions were performed on the scanner to include MIP images through the right and left pulmonary arteries and sagittal images through the chest. Isovue was the intravenous contrast utilized. All CT scans at this facility use dose modulation, iterative reconstruction, and/or weight-based dosing when appropriate to reduce radiation dose to as low as reasonably achievable. FINDINGS: The thyroid gland is present. The central airways are patent. There is bilateral gynecomastia. The heart is normal in size. There is no pericardial effusion. The pulmonary arteries are adequately opacified. There are no pulmonary emboli. There are enlarged lymph nodes in the mediastinum. There are diffuse groundglass and nodular opacities throughout the lungs bilaterally, predominantly in a peripheral distribution. There is no pleural effusion or pneumothorax. The spleen is enlarged measuring 14 cm. The bones are intact. 1. Extensive bilateral pneumonia. 2. Splenomegaly. 3. No evidence of a pulmonary embolism. **This report has been created using voice recognition software. It may contain minor errors which are inherent in voice recognition technology. ** Final report electronically signed by Dr Alexandro Jurado on 11/20/2021 9:51 PM       Results for orders placed or performed during the hospital encounter of 11/20/21   Culture, Blood 1    Specimen: Blood   Result Value Ref Range    Blood Culture, Routine No growth-preliminary     Culture, Blood 2    Specimen: Blood   Result Value Ref Range    Blood Culture, Routine No growth-preliminary     COVID-19, Rapid    Specimen: Nasopharyngeal Swab   Result Value Ref Range    SARS-CoV-2, NAAT NOT  DETECTED NOT DETECTED   CBC auto differential   Result Value Ref Range WBC 8.3 4.8 - 10.8 thou/mm3    RBC 4.93 4.70 - 6.10 mill/mm3    Hemoglobin 14.9 14.0 - 18.0 gm/dl    Hematocrit 42.6 42.0 - 52.0 %    MCV 86.4 80.0 - 94.0 fL    MCH 30.2 26.0 - 33.0 pg    MCHC 35.0 32.2 - 35.5 gm/dl    RDW-CV 12.9 11.5 - 14.5 %    RDW-SD 40.2 35.0 - 45.0 fL    Platelets 676 836 - 610 thou/mm3    MPV 11.1 9.4 - 12.4 fL    Seg Neutrophils 88.3 %    Lymphocytes 7.6 %    Monocytes 3.5 %    Eosinophils 0.0 %    Basophils 0.0 %    Immature Granulocytes 0.6 %    Segs Absolute 7.3 1.8 - 7.7 thou/mm3    Lymphocytes Absolute 0.6 (L) 1.0 - 4.8 thou/mm3    Monocytes Absolute 0.3 (L) 0.4 - 1.3 thou/mm3    Eosinophils Absolute 0.0 0.0 - 0.4 thou/mm3    Basophils Absolute 0.0 0.0 - 0.1 thou/mm3    Immature Grans (Abs) 0.05 0.00 - 0.07 thou/mm3    nRBC 0 /100 wbc   Comprehensive Metabolic Panel   Result Value Ref Range    Glucose 121 (H) 70 - 108 mg/dL    CREATININE 0.9 0.4 - 1.2 mg/dL    BUN 21 7 - 22 mg/dL    Sodium 139 135 - 145 meq/L    Potassium 3.8 3.5 - 5.2 meq/L    Chloride 102 98 - 111 meq/L    CO2 26 23 - 33 meq/L    Calcium 8.7 8.5 - 10.5 mg/dL    AST 53 (H) 5 - 40 U/L    Alkaline Phosphatase 73 38 - 126 U/L    Total Protein 6.0 (L) 6.1 - 8.0 g/dL    Albumin 3.7 3.5 - 5.1 g/dL    Total Bilirubin 0.4 0.3 - 1.2 mg/dL    ALT 36 11 - 66 U/L   Ferritin   Result Value Ref Range    Ferritin 1,767 (H) 22 - 322 ng/mL   C-reactive protein   Result Value Ref Range    CRP 6.24 (H) 0.00 - 1.00 mg/dl   Lactate Dehydrogenase   Result Value Ref Range     (H) 100 - 190 U/L   Procalcitonin   Result Value Ref Range    Procalcitonin 0.50 (H) 0.01 - 0.09 ng/mL   TSH with Reflex   Result Value Ref Range    TSH 1.060 0.400 - 4.200 uIU/mL   Lactic acid, plasma   Result Value Ref Range    Lactic Acid 1.3 0.5 - 2.0 mmol/L   Troponin   Result Value Ref Range    Troponin T < 0.010 ng/ml   Anion Gap   Result Value Ref Range    Anion Gap 11.0 8.0 - 16.0 meq/L   Osmolality   Result Value Ref Range    Osmolality Calc 281.8 275.0 - 300.0 mOsmol/kg   Glomerular Filtration Rate, Estimated   Result Value Ref Range    Est, Glom Filt Rate >90 KQ/BFV/1.93B7   Basic Metabolic Panel w/ Reflex to MG   Result Value Ref Range    Sodium 141 135 - 145 meq/L    Potassium reflex Magnesium 4.1 3.5 - 5.2 meq/L    Chloride 107 98 - 111 meq/L    CO2 24 23 - 33 meq/L    Glucose 141 (H) 70 - 108 mg/dL    BUN 19 7 - 22 mg/dL    CREATININE 0.9 0.4 - 1.2 mg/dL    Calcium 8.0 (L) 8.5 - 10.5 mg/dL   CBC auto differential   Result Value Ref Range    WBC 8.2 4.8 - 10.8 thou/mm3    RBC 4.59 (L) 4.70 - 6.10 mill/mm3    Hemoglobin 14.1 14.0 - 18.0 gm/dl    Hematocrit 39.8 (L) 42.0 - 52.0 %    MCV 86.7 80.0 - 94.0 fL    MCH 30.7 26.0 - 33.0 pg    MCHC 35.4 32.2 - 35.5 gm/dl    RDW-CV 13.2 11.5 - 14.5 %    RDW-SD 41.2 35.0 - 45.0 fL    Platelets 692 116 - 938 thou/mm3    MPV 11.0 9.4 - 12.4 fL    Seg Neutrophils 90.9 %    Lymphocytes 6.0 %    Monocytes 2.3 %    Eosinophils 0.0 %    Basophils 0.1 %    Immature Granulocytes 0.7 %    Segs Absolute 7.5 1.8 - 7.7 thou/mm3    Lymphocytes Absolute 0.5 (L) 1.0 - 4.8 thou/mm3    Monocytes Absolute 0.2 (L) 0.4 - 1.3 thou/mm3    Eosinophils Absolute 0.0 0.0 - 0.4 thou/mm3    Basophils Absolute 0.0 0.0 - 0.1 thou/mm3    Immature Grans (Abs) 0.06 0.00 - 0.07 thou/mm3    nRBC 0 /100 wbc   Blood Gas, Arterial   Result Value Ref Range    pH, Blood Gas 7.44 7.35 - 7.45    PCO2 37 35 - 45 mmhg    PO2 187 (H) 71 - 104 mmhg    HCO3 25 23 - 28 mmol/l    Base Excess (Calculated) 0.8 -2.5 - 2.5 mmol/l    O2 Sat 100 %    IFIO2 100     DEVICE BiPAP     Roberto Test Positive     Source: R Radial     COLLECTED BY: [id number]    Anion Gap   Result Value Ref Range    Anion Gap 10.0 8.0 - 16.0 meq/L   Glomerular Filtration Rate, Estimated   Result Value Ref Range    Est, Glom Filt Rate >90 ml/min/1.73m2   Osmolality   Result Value Ref Range    Osmolality Calc 285.9 275.0 - 300.0 mOsmol/kg   Hemoglobin A1c   Result Value Ref Range    Hemoglobin A1C 5.6 4.4 - 6.4 %    AVERAGE GLUCOSE 108 70 - 126 mg/dL   CBC Auto Differential   Result Value Ref Range    WBC 8.9 4.8 - 10.8 thou/mm3    RBC 4.98 4.70 - 6.10 mill/mm3    Hemoglobin 14.9 14.0 - 18.0 gm/dl    Hematocrit 44.3 42.0 - 52.0 %    MCV 89.0 80.0 - 94.0 fL    MCH 29.9 26.0 - 33.0 pg    MCHC 33.6 32.2 - 35.5 gm/dl    RDW-CV 13.0 11.5 - 14.5 %    RDW-SD 42.5 35.0 - 45.0 fL    Platelets 629 611 - 133 thou/mm3    MPV 11.5 9.4 - 12.4 fL    Seg Neutrophils 86.4 %    Lymphocytes 10.0 %    Monocytes 2.9 %    Eosinophils 0.0 %    Basophils 0.1 %    Immature Granulocytes 0.6 %    Atypical Lymphocytes RARE %    Platelet Estimate ADEQUATE Adequate    Segs Absolute 7.7 1.8 - 7.7 thou/mm3    Lymphocytes Absolute 0.9 (L) 1.0 - 4.8 thou/mm3    Monocytes Absolute 0.3 (L) 0.4 - 1.3 thou/mm3    Eosinophils Absolute 0.0 0.0 - 0.4 thou/mm3    Basophils Absolute 0.0 0.0 - 0.1 thou/mm3    Immature Grans (Abs) 0.05 0.00 - 0.07 thou/mm3    nRBC 0 /100 wbc   Basic Metabolic Panel   Result Value Ref Range    Sodium 144 135 - 145 meq/L    Potassium 4.6 3.5 - 5.2 meq/L    Chloride 107 98 - 111 meq/L    CO2 24 23 - 33 meq/L    Glucose 145 (H) 70 - 108 mg/dL    BUN 20 7 - 22 mg/dL    CREATININE 0.9 0.4 - 1.2 mg/dL    Calcium 8.4 (L) 8.5 - 10.5 mg/dL   Ferritin   Result Value Ref Range    Ferritin 1,503 (H) 22 - 322 ng/mL   C-reactive protein   Result Value Ref Range    CRP 2.88 (H) 0.00 - 1.00 mg/dl   Lactate dehydrogenase   Result Value Ref Range    LD 1001 (H) 100 - 190 U/L   Anion Gap   Result Value Ref Range    Anion Gap 13.0 8.0 - 16.0 meq/L   Glomerular Filtration Rate, Estimated   Result Value Ref Range    Est, Glom Filt Rate >90 ml/min/1.73m2   Scan of Blood Smear   Result Value Ref Range    SCAN OF BLOOD SMEAR see below    Magnesium   Result Value Ref Range    Magnesium 2.5 (H) 1.6 - 2.4 mg/dL   Troponin   Result Value Ref Range    Troponin T < 0.010 ng/ml   Basic Metabolic Panel   Result Value Ref Range    Sodium 142 135 - 145 meq/L

## 2021-11-24 NOTE — PROGRESS NOTES
Attempted to call wife on the phone to update her on patient's overnight status with no answer. Left a voicemail to let her know to call back for more information.

## 2021-11-24 NOTE — FLOWSHEET NOTE
Pt was anointed     11/24/21 1521   Encounter Summary   Services provided to: Patient   Referral/Consult From: Rounding   Place of [address] Visiting Yes  (11/24)   Complexity of Encounter Low   Length of Encounter 15 minutes   Routine   Type Initial   Assessment Calm; Approachable   Intervention [place]; Prayer; Nurtured hope   Outcome Acceptance; Expressed gratitude; Encouraged; Hopeful   Sacraments   Sacrament of Sick-Anointing Anointed   .

## 2021-11-24 NOTE — PROGRESS NOTES
A home oxygen evaluation has been completed. [x]Patient is an inpatient. It is expected that the patient will be discharged within the next 48 hours. Qualified provider to write order for home prescription if patient qualifies. Social service/care managers will arrange for home oxygen. If patient is active, arrange for Home Medical supplier to assess for Oxygen Conserving Device per pulse oximetry. []Patient is an outpatient. Results will be faxed to the ordering provider. Qualified provider to write order for home prescription if patient qualifies and arranges for home oxygen. Patient was placed on room air for 5 minutes. SpO2 was 87 % on room air at rest. Patients SpO2 was below 89% and qualified for home oxygen. Oxygen was applied at 1 lpm via nasal cannula to maintain a SpO2 between 90-92% while at rest. Actual SpO2 was 92 %. Patient can ambulate for exercise flow rate. Patients was ambulated, SpO2 was 92% on 4 lpm to maintain SpO2 between 90-92% while exercising. Note: For any SpO2 at 85% see policy and procedure for possible qualifications.

## 2021-11-25 ENCOUNTER — APPOINTMENT (OUTPATIENT)
Dept: CT IMAGING | Age: 41
DRG: 177 | End: 2021-11-25
Payer: COMMERCIAL

## 2021-11-25 ENCOUNTER — HOSPITAL ENCOUNTER (INPATIENT)
Age: 41
LOS: 8 days | Discharge: HOME | DRG: 177 | End: 2021-12-03
Attending: EMERGENCY MEDICINE | Admitting: HOSPITALIST
Payer: COMMERCIAL

## 2021-11-25 ENCOUNTER — APPOINTMENT (OUTPATIENT)
Dept: GENERAL RADIOLOGY | Age: 41
DRG: 177 | End: 2021-11-25
Payer: COMMERCIAL

## 2021-11-25 DIAGNOSIS — J96.01 ACUTE RESPIRATORY FAILURE WITH HYPOXIA (HCC): Primary | ICD-10-CM

## 2021-11-25 PROBLEM — U07.1 PNEUMONIA DUE TO SEVERE ACUTE RESPIRATORY SYNDROME CORONAVIRUS 2 (SARS-COV-2): Status: ACTIVE | Noted: 2021-11-25

## 2021-11-25 PROBLEM — J12.82 PNEUMONIA DUE TO SEVERE ACUTE RESPIRATORY SYNDROME CORONAVIRUS 2 (SARS-COV-2): Status: ACTIVE | Noted: 2021-11-25

## 2021-11-25 LAB
ANION GAP SERPL CALCULATED.3IONS-SCNC: 11 MEQ/L (ref 8–16)
ATYPICAL LYMPHOCYTES: ABNORMAL %
BACTERIA: ABNORMAL /HPF
BASE EXCESS MIXED: -1.5 MMOL/L (ref -2–3)
BASE EXCESS MIXED: 0.7 MMOL/L (ref -2–3)
BASOPHILS # BLD: 0.4 %
BASOPHILS ABSOLUTE: 0 THOU/MM3 (ref 0–0.1)
BILIRUBIN URINE: NEGATIVE
BLOOD, URINE: NEGATIVE
BUN BLDV-MCNC: 20 MG/DL (ref 7–22)
C-REACTIVE PROTEIN: 9.89 MG/DL (ref 0–1)
CALCIUM SERPL-MCNC: 7.6 MG/DL (ref 8.5–10.5)
CASTS 2: ABNORMAL /LPF
CASTS UA: ABNORMAL /LPF
CHARACTER, URINE: CLEAR
CHLORIDE BLD-SCNC: 105 MEQ/L (ref 98–111)
CO2: 18 MEQ/L (ref 23–33)
COLLECTED BY:: ABNORMAL
COLLECTED BY:: ABNORMAL
COLOR: YELLOW
CREAT SERPL-MCNC: 0.9 MG/DL (ref 0.4–1.2)
CRYSTALS, UA: ABNORMAL
EKG ATRIAL RATE: 81 BPM
EKG P AXIS: 49 DEGREES
EKG P-R INTERVAL: 124 MS
EKG Q-T INTERVAL: 362 MS
EKG QRS DURATION: 76 MS
EKG QTC CALCULATION (BAZETT): 420 MS
EKG R AXIS: 64 DEGREES
EKG T AXIS: 49 DEGREES
EKG VENTRICULAR RATE: 81 BPM
EOSINOPHIL # BLD: 0.4 %
EOSINOPHILS ABSOLUTE: 0 THOU/MM3 (ref 0–0.4)
EPITHELIAL CELLS, UA: ABNORMAL /HPF
ERYTHROCYTE [DISTWIDTH] IN BLOOD BY AUTOMATED COUNT: 12.4 % (ref 11.5–14.5)
ERYTHROCYTE [DISTWIDTH] IN BLOOD BY AUTOMATED COUNT: 39.5 FL (ref 35–45)
FERRITIN: 1659 NG/ML (ref 22–322)
GFR SERPL CREATININE-BSD FRML MDRD: > 90 ML/MIN/1.73M2
GLUCOSE BLD-MCNC: 97 MG/DL (ref 70–108)
GLUCOSE URINE: NEGATIVE MG/DL
HCO3, MIXED: 22 MMOL/L (ref 23–28)
HCO3, MIXED: 25 MMOL/L (ref 23–28)
HCT VFR BLD CALC: 46.7 % (ref 42–52)
HEMOGLOBIN: 16.3 GM/DL (ref 14–18)
IMMATURE GRANS (ABS): 0.27 THOU/MM3 (ref 0–0.07)
IMMATURE GRANULOCYTES: 3.8 %
KETONES, URINE: NEGATIVE
LACTIC ACID, SEPSIS: 1 MMOL/L (ref 0.5–1.9)
LACTIC ACID, SEPSIS: 1.6 MMOL/L (ref 0.5–1.9)
LD: 625 U/L (ref 100–190)
LEUKOCYTE ESTERASE, URINE: NEGATIVE
LYMPHOCYTES # BLD: 8.1 %
LYMPHOCYTES ABSOLUTE: 0.6 THOU/MM3 (ref 1–4.8)
MCH RBC QN AUTO: 30.6 PG (ref 26–33)
MCHC RBC AUTO-ENTMCNC: 34.9 GM/DL (ref 32.2–35.5)
MCV RBC AUTO: 87.6 FL (ref 80–94)
MISCELLANEOUS 2: ABNORMAL
MONOCYTES # BLD: 2.1 %
MONOCYTES ABSOLUTE: 0.2 THOU/MM3 (ref 0.4–1.3)
NITRITE, URINE: NEGATIVE
NUCLEATED RED BLOOD CELLS: 0 /100 WBC
O2 SAT, MIXED: 45 %
O2 SAT, MIXED: 92 %
OSMOLALITY CALCULATION: 270.8 MOSMOL/KG (ref 275–300)
PCO2, MIXED VENOUS: 33 MMHG (ref 41–51)
PCO2, MIXED VENOUS: 37 MMHG (ref 41–51)
PH UA: 6.5 (ref 5–9)
PH, MIXED: 7.44 (ref 7.31–7.41)
PH, MIXED: 7.44 (ref 7.31–7.41)
PLATELET # BLD: 185 THOU/MM3 (ref 130–400)
PLATELET ESTIMATE: ADEQUATE
PMV BLD AUTO: 10.8 FL (ref 9.4–12.4)
PO2 MIXED: 24 MMHG (ref 25–40)
PO2 MIXED: 60 MMHG (ref 25–40)
POTASSIUM REFLEX MAGNESIUM: 3.9 MEQ/L (ref 3.5–5.2)
PRO-BNP: 59.1 PG/ML (ref 0–450)
PROCALCITONIN: 0.36 NG/ML (ref 0.01–0.09)
PROTEIN UA: 30
RBC # BLD: 5.33 MILL/MM3 (ref 4.7–6.1)
RBC URINE: ABNORMAL /HPF
REASON FOR REJECTION: NORMAL
REJECTED TEST: NORMAL
RENAL EPITHELIAL, UA: ABNORMAL
SARS-COV-2, NAAT: NOT  DETECTED
SCAN OF BLOOD SMEAR: NORMAL
SEG NEUTROPHILS: 85.2 %
SEGMENTED NEUTROPHILS ABSOLUTE COUNT: 6.1 THOU/MM3 (ref 1.8–7.7)
SODIUM BLD-SCNC: 134 MEQ/L (ref 135–145)
SPECIFIC GRAVITY, URINE: > 1.03 (ref 1–1.03)
TROPONIN T: < 0.01 NG/ML
UROBILINOGEN, URINE: 1 EU/DL (ref 0–1)
WBC # BLD: 7.2 THOU/MM3 (ref 4.8–10.8)
WBC UA: ABNORMAL /HPF
YEAST: ABNORMAL

## 2021-11-25 PROCEDURE — 2580000003 HC RX 258: Performed by: STUDENT IN AN ORGANIZED HEALTH CARE EDUCATION/TRAINING PROGRAM

## 2021-11-25 PROCEDURE — 2060000000 HC ICU INTERMEDIATE R&B

## 2021-11-25 PROCEDURE — 96365 THER/PROPH/DIAG IV INF INIT: CPT

## 2021-11-25 PROCEDURE — 96375 TX/PRO/DX INJ NEW DRUG ADDON: CPT

## 2021-11-25 PROCEDURE — 82803 BLOOD GASES ANY COMBINATION: CPT

## 2021-11-25 PROCEDURE — 83880 ASSAY OF NATRIURETIC PEPTIDE: CPT

## 2021-11-25 PROCEDURE — 84145 PROCALCITONIN (PCT): CPT

## 2021-11-25 PROCEDURE — 71045 X-RAY EXAM CHEST 1 VIEW: CPT

## 2021-11-25 PROCEDURE — 99223 1ST HOSP IP/OBS HIGH 75: CPT | Performed by: INTERNAL MEDICINE

## 2021-11-25 PROCEDURE — 83615 LACTATE (LD) (LDH) ENZYME: CPT

## 2021-11-25 PROCEDURE — 82728 ASSAY OF FERRITIN: CPT

## 2021-11-25 PROCEDURE — 87635 SARS-COV-2 COVID-19 AMP PRB: CPT

## 2021-11-25 PROCEDURE — 87040 BLOOD CULTURE FOR BACTERIA: CPT

## 2021-11-25 PROCEDURE — 0202U NFCT DS 22 TRGT SARS-COV-2: CPT

## 2021-11-25 PROCEDURE — 6360000002 HC RX W HCPCS: Performed by: STUDENT IN AN ORGANIZED HEALTH CARE EDUCATION/TRAINING PROGRAM

## 2021-11-25 PROCEDURE — XW0DXM6 INTRODUCTION OF BARICITINIB INTO MOUTH AND PHARYNX, EXTERNAL APPROACH, NEW TECHNOLOGY GROUP 6: ICD-10-PCS | Performed by: INTERNAL MEDICINE

## 2021-11-25 PROCEDURE — 81001 URINALYSIS AUTO W/SCOPE: CPT

## 2021-11-25 PROCEDURE — 86140 C-REACTIVE PROTEIN: CPT

## 2021-11-25 PROCEDURE — 85025 COMPLETE CBC W/AUTO DIFF WBC: CPT

## 2021-11-25 PROCEDURE — 84484 ASSAY OF TROPONIN QUANT: CPT

## 2021-11-25 PROCEDURE — 87205 SMEAR GRAM STAIN: CPT

## 2021-11-25 PROCEDURE — 84443 ASSAY THYROID STIM HORMONE: CPT

## 2021-11-25 PROCEDURE — 2700000000 HC OXYGEN THERAPY PER DAY

## 2021-11-25 PROCEDURE — 83036 HEMOGLOBIN GLYCOSYLATED A1C: CPT

## 2021-11-25 PROCEDURE — 36415 COLL VENOUS BLD VENIPUNCTURE: CPT

## 2021-11-25 PROCEDURE — 83605 ASSAY OF LACTIC ACID: CPT

## 2021-11-25 PROCEDURE — 96366 THER/PROPH/DIAG IV INF ADDON: CPT

## 2021-11-25 PROCEDURE — 93005 ELECTROCARDIOGRAM TRACING: CPT | Performed by: STUDENT IN AN ORGANIZED HEALTH CARE EDUCATION/TRAINING PROGRAM

## 2021-11-25 PROCEDURE — 6360000004 HC RX CONTRAST MEDICATION: Performed by: STUDENT IN AN ORGANIZED HEALTH CARE EDUCATION/TRAINING PROGRAM

## 2021-11-25 PROCEDURE — 94761 N-INVAS EAR/PLS OXIMETRY MLT: CPT

## 2021-11-25 PROCEDURE — 99285 EMERGENCY DEPT VISIT HI MDM: CPT

## 2021-11-25 PROCEDURE — 87449 NOS EACH ORGANISM AG IA: CPT

## 2021-11-25 PROCEDURE — 71275 CT ANGIOGRAPHY CHEST: CPT

## 2021-11-25 PROCEDURE — 80048 BASIC METABOLIC PNL TOTAL CA: CPT

## 2021-11-25 RX ORDER — ASPIRIN 81 MG/1
81 TABLET, CHEWABLE ORAL DAILY
Status: DISCONTINUED | OUTPATIENT
Start: 2021-11-26 | End: 2021-12-03 | Stop reason: HOSPADM

## 2021-11-25 RX ORDER — ACETAMINOPHEN 650 MG/1
650 SUPPOSITORY RECTAL EVERY 6 HOURS PRN
Status: DISCONTINUED | OUTPATIENT
Start: 2021-11-25 | End: 2021-12-03 | Stop reason: HOSPADM

## 2021-11-25 RX ORDER — POLYETHYLENE GLYCOL 3350 17 G/17G
17 POWDER, FOR SOLUTION ORAL DAILY PRN
Status: DISCONTINUED | OUTPATIENT
Start: 2021-11-25 | End: 2021-12-03 | Stop reason: HOSPADM

## 2021-11-25 RX ORDER — ZINC SULFATE 50(220)MG
50 CAPSULE ORAL DAILY
Status: DISCONTINUED | OUTPATIENT
Start: 2021-11-26 | End: 2021-12-03 | Stop reason: HOSPADM

## 2021-11-25 RX ORDER — DEXAMETHASONE SODIUM PHOSPHATE 4 MG/ML
6 INJECTION, SOLUTION INTRA-ARTICULAR; INTRALESIONAL; INTRAMUSCULAR; INTRAVENOUS; SOFT TISSUE ONCE
Status: COMPLETED | OUTPATIENT
Start: 2021-11-25 | End: 2021-11-25

## 2021-11-25 RX ORDER — 0.9 % SODIUM CHLORIDE 0.9 %
30 INTRAVENOUS SOLUTION INTRAVENOUS ONCE
Status: COMPLETED | OUTPATIENT
Start: 2021-11-25 | End: 2021-11-25

## 2021-11-25 RX ORDER — DEXAMETHASONE SODIUM PHOSPHATE 4 MG/ML
6 INJECTION, SOLUTION INTRA-ARTICULAR; INTRALESIONAL; INTRAMUSCULAR; INTRAVENOUS; SOFT TISSUE EVERY 24 HOURS
Status: DISCONTINUED | OUTPATIENT
Start: 2021-11-26 | End: 2021-11-29

## 2021-11-25 RX ORDER — SODIUM CHLORIDE 9 MG/ML
25 INJECTION, SOLUTION INTRAVENOUS PRN
Status: DISCONTINUED | OUTPATIENT
Start: 2021-11-25 | End: 2021-12-03 | Stop reason: HOSPADM

## 2021-11-25 RX ORDER — ASCORBIC ACID 500 MG
500 TABLET ORAL DAILY
Status: DISCONTINUED | OUTPATIENT
Start: 2021-11-26 | End: 2021-12-03 | Stop reason: HOSPADM

## 2021-11-25 RX ORDER — ACETAMINOPHEN 325 MG/1
650 TABLET ORAL EVERY 6 HOURS PRN
Status: DISCONTINUED | OUTPATIENT
Start: 2021-11-25 | End: 2021-12-03 | Stop reason: HOSPADM

## 2021-11-25 RX ORDER — SODIUM CHLORIDE 0.9 % (FLUSH) 0.9 %
5-40 SYRINGE (ML) INJECTION PRN
Status: DISCONTINUED | OUTPATIENT
Start: 2021-11-25 | End: 2021-12-03 | Stop reason: HOSPADM

## 2021-11-25 RX ORDER — LEVOTHYROXINE SODIUM 0.03 MG/1
25 TABLET ORAL DAILY
Status: DISCONTINUED | OUTPATIENT
Start: 2021-11-26 | End: 2021-11-26

## 2021-11-25 RX ORDER — PANTOPRAZOLE SODIUM 40 MG/1
40 TABLET, DELAYED RELEASE ORAL
Status: DISCONTINUED | OUTPATIENT
Start: 2021-11-26 | End: 2021-12-03 | Stop reason: HOSPADM

## 2021-11-25 RX ORDER — KETOROLAC TROMETHAMINE 30 MG/ML
15 INJECTION, SOLUTION INTRAMUSCULAR; INTRAVENOUS ONCE
Status: COMPLETED | OUTPATIENT
Start: 2021-11-25 | End: 2021-11-25

## 2021-11-25 RX ORDER — VITAMIN B COMPLEX
2000 TABLET ORAL DAILY
Status: DISCONTINUED | OUTPATIENT
Start: 2021-11-26 | End: 2021-12-03 | Stop reason: HOSPADM

## 2021-11-25 RX ORDER — ONDANSETRON 2 MG/ML
4 INJECTION INTRAMUSCULAR; INTRAVENOUS EVERY 6 HOURS PRN
Status: DISCONTINUED | OUTPATIENT
Start: 2021-11-25 | End: 2021-12-03 | Stop reason: HOSPADM

## 2021-11-25 RX ORDER — SODIUM CHLORIDE 0.9 % (FLUSH) 0.9 %
5-40 SYRINGE (ML) INJECTION EVERY 12 HOURS SCHEDULED
Status: DISCONTINUED | OUTPATIENT
Start: 2021-11-25 | End: 2021-12-03 | Stop reason: HOSPADM

## 2021-11-25 RX ORDER — ONDANSETRON 4 MG/1
4 TABLET, ORALLY DISINTEGRATING ORAL EVERY 8 HOURS PRN
Status: DISCONTINUED | OUTPATIENT
Start: 2021-11-25 | End: 2021-12-03 | Stop reason: HOSPADM

## 2021-11-25 RX ADMIN — KETOROLAC TROMETHAMINE 15 MG: 30 INJECTION, SOLUTION INTRAMUSCULAR; INTRAVENOUS at 15:33

## 2021-11-25 RX ADMIN — SODIUM CHLORIDE 1914 ML: 9 INJECTION, SOLUTION INTRAVENOUS at 15:20

## 2021-11-25 RX ADMIN — PIPERACILLIN AND TAZOBACTAM 4500 MG: 4; .5 INJECTION, POWDER, FOR SOLUTION INTRAVENOUS at 19:05

## 2021-11-25 RX ADMIN — IOPAMIDOL 80 ML: 755 INJECTION, SOLUTION INTRAVENOUS at 18:41

## 2021-11-25 RX ADMIN — DEXAMETHASONE SODIUM PHOSPHATE 6 MG: 4 INJECTION, SOLUTION INTRA-ARTICULAR; INTRALESIONAL; INTRAMUSCULAR; INTRAVENOUS; SOFT TISSUE at 21:52

## 2021-11-25 ASSESSMENT — ENCOUNTER SYMPTOMS
VOICE CHANGE: 0
EYE ITCHING: 0
COUGH: 1
ABDOMINAL PAIN: 0
WHEEZING: 0
EYE PAIN: 0
ABDOMINAL DISTENTION: 0
TROUBLE SWALLOWING: 0
CHEST TIGHTNESS: 0
NAUSEA: 0
RHINORRHEA: 0
SINUS PAIN: 0
EYE REDNESS: 0
VOMITING: 0
SHORTNESS OF BREATH: 1
EYE DISCHARGE: 0
BLOOD IN STOOL: 0
RECTAL PAIN: 0
ANAL BLEEDING: 0
BACK PAIN: 0
SINUS PRESSURE: 0
CONSTIPATION: 0
FACIAL SWELLING: 0
DIARRHEA: 0
STRIDOR: 0
CHOKING: 0
PHOTOPHOBIA: 0
SORE THROAT: 0
COLOR CHANGE: 0
APNEA: 0

## 2021-11-25 ASSESSMENT — PAIN DESCRIPTION - LOCATION: LOCATION: BACK

## 2021-11-25 ASSESSMENT — PAIN DESCRIPTION - PAIN TYPE: TYPE: ACUTE PAIN

## 2021-11-25 ASSESSMENT — PAIN SCALES - GENERAL
PAINLEVEL_OUTOF10: 0
PAINLEVEL_OUTOF10: 4
PAINLEVEL_OUTOF10: 4
PAINLEVEL_OUTOF10: 0

## 2021-11-25 ASSESSMENT — PAIN DESCRIPTION - DESCRIPTORS: DESCRIPTORS: SORE

## 2021-11-25 NOTE — ED NOTES
Patient resting on cart with wife at bedside, awaiting antibiotics from pharmacy.      Ruiz Argueta RN  11/25/21 4507

## 2021-11-25 NOTE — PROGRESS NOTES
Wife here. Wife showed how to use portable o2 tank. Discharge instructions and prescription given with verbalization of understanding. Discharged per wheelchair to car with wife. Home O2 called to deliver O2 to home.

## 2021-11-25 NOTE — ED TRIAGE NOTES
Patient to room 11 via [facility] for complaint of shortness of breath. Per report, patient was just discharged from [facility] last night on home oxygen following a hospitalization for COVID. Patient developed increased shortness of breath and fatigue today. Upon arrival to department patient is on non-rebreather mask on EMS cart. During transfer to wall oxygen and brief switch to hospital equipment, patient noted to be 75% on room air and dropping. Non-rebreather mask applied and resident called to room, saturation rises to 93% on non-rebreather. EKG completed at bedside. Patient is very anxious which wife reports is an ongoing issue. Wife reports that patient was using 10 L of oxygen at home when she called EMS, and he was supposed to be using 1-4 L.

## 2021-11-25 NOTE — Clinical Note
Patient Class: Inpatient [101]   REQUIRED: Diagnosis: Pneumonia due to severe acute respiratory syndrome coronavirus 2 (SARS-CoV-2) [[id number]]   Estimated Length of Stay: Estimated stay of more than 2 midnights   Future Attending Provider: Jailyn Palmer [[id number]]   Telemetry/Cardiac Monitoring Required?: No

## 2021-11-26 LAB
AVERAGE GLUCOSE: 111 MG/DL (ref 70–126)
BLOOD CULTURE, ROUTINE: NORMAL
BLOOD CULTURE, ROUTINE: NORMAL
BORDETELLA PARAPERTUSSIS BY PCR: NOT DETECTED
BORDETELLA PERTUSSIS BY PCR: NOT DETECTED
FILM ARRAY ADENOVIRUS: NOT DETECTED
FILM ARRAY CHLAMYDOPHILIA PNEUMONIAE: NOT DETECTED
FILM ARRAY CORONAVIRUS 229E: NOT DETECTED
FILM ARRAY CORONAVIRUS HKU1: NOT DETECTED
FILM ARRAY CORONAVIRUS NL63: NOT DETECTED
FILM ARRAY CORONAVIRUS OC43: NOT DETECTED
FILM ARRAY INFLUENZA A VIRUS: NOT DETECTED
FILM ARRAY INFLUENZA B: NOT DETECTED
FILM ARRAY METAPNEUMOVIRUS: NOT DETECTED
FILM ARRAY MYCOPLASMA PNEUMONIAE: NOT DETECTED
FILM ARRAY PARAINFLUENZA VIRUS 1: NOT DETECTED
FILM ARRAY PARAINFLUENZA VIRUS 2: NOT DETECTED
FILM ARRAY PARAINFLUENZA VIRUS 3: NOT DETECTED
FILM ARRAY PARAINFLUENZA VIRUS 4: NOT DETECTED
FILM ARRAY RESPIRATORY SYNCITIAL VIRUS: NOT DETECTED
FILM ARRAY RHINOVIRUS/ENTEROVIRUS: NOT DETECTED
HBA1C MFR BLD: 5.7 % (ref 4.4–6.4)
LEGIONELLA PNEUMOPHILIA AG, URINE: NEGATIVE
REASON FOR REJECTION: NORMAL
REJECTED TEST: NORMAL
SARS-COV-2, PCR: DETECTED
TSH SERPL DL<=0.05 MIU/L-ACNC: 1.91 UIU/ML (ref 0.4–4.2)

## 2021-11-26 PROCEDURE — 6370000000 HC RX 637 (ALT 250 FOR IP): Performed by: INTERNAL MEDICINE

## 2021-11-26 PROCEDURE — 6370000000 HC RX 637 (ALT 250 FOR IP): Performed by: HOSPITALIST

## 2021-11-26 PROCEDURE — 6360000002 HC RX W HCPCS: Performed by: INTERNAL MEDICINE

## 2021-11-26 PROCEDURE — 94761 N-INVAS EAR/PLS OXIMETRY MLT: CPT

## 2021-11-26 PROCEDURE — 99233 SBSQ HOSP IP/OBS HIGH 50: CPT | Performed by: HOSPITALIST

## 2021-11-26 PROCEDURE — 2580000003 HC RX 258: Performed by: INTERNAL MEDICINE

## 2021-11-26 PROCEDURE — 6370000000 HC RX 637 (ALT 250 FOR IP)

## 2021-11-26 PROCEDURE — 2700000000 HC OXYGEN THERAPY PER DAY

## 2021-11-26 PROCEDURE — 2060000000 HC ICU INTERMEDIATE R&B

## 2021-11-26 RX ORDER — LANOLIN ALCOHOL/MO/W.PET/CERES
6 CREAM (GRAM) TOPICAL NIGHTLY PRN
Status: DISCONTINUED | OUTPATIENT
Start: 2021-11-26 | End: 2021-11-29

## 2021-11-26 RX ORDER — LORAZEPAM 1 MG/1
0.5 TABLET ORAL EVERY 6 HOURS PRN
Status: DISCONTINUED | OUTPATIENT
Start: 2021-11-26 | End: 2021-11-26

## 2021-11-26 RX ORDER — LORAZEPAM 1 MG/1
TABLET ORAL
Status: COMPLETED
Start: 2021-11-26 | End: 2021-11-26

## 2021-11-26 RX ORDER — LEVOTHYROXINE SODIUM 0.07 MG/1
75 TABLET ORAL DAILY
Status: DISCONTINUED | OUTPATIENT
Start: 2021-11-26 | End: 2021-12-03 | Stop reason: HOSPADM

## 2021-11-26 RX ORDER — LORAZEPAM 1 MG/1
1 TABLET ORAL EVERY 6 HOURS PRN
Status: DISCONTINUED | OUTPATIENT
Start: 2021-11-26 | End: 2021-12-03 | Stop reason: HOSPADM

## 2021-11-26 RX ADMIN — SODIUM CHLORIDE 25 ML: 9 INJECTION, SOLUTION INTRAVENOUS at 20:53

## 2021-11-26 RX ADMIN — BARICITINIB 4 MG: 2 TABLET, FILM COATED ORAL at 04:29

## 2021-11-26 RX ADMIN — ASPIRIN 81 MG CHEWABLE TABLET 81 MG: 81 TABLET CHEWABLE at 11:20

## 2021-11-26 RX ADMIN — LEVOTHYROXINE SODIUM 75 MCG: 0.07 TABLET ORAL at 16:42

## 2021-11-26 RX ADMIN — ACETAMINOPHEN 650 MG: 325 TABLET ORAL at 20:31

## 2021-11-26 RX ADMIN — LORAZEPAM 1 MG: 1 TABLET ORAL at 21:53

## 2021-11-26 RX ADMIN — LORAZEPAM 0.5 MG: 1 TABLET ORAL at 04:29

## 2021-11-26 RX ADMIN — ENOXAPARIN SODIUM 30 MG: 100 INJECTION SUBCUTANEOUS at 21:53

## 2021-11-26 RX ADMIN — DEXAMETHASONE SODIUM PHOSPHATE 6 MG: 4 INJECTION, SOLUTION INTRA-ARTICULAR; INTRALESIONAL; INTRAMUSCULAR; INTRAVENOUS; SOFT TISSUE at 21:53

## 2021-11-26 RX ADMIN — ENOXAPARIN SODIUM 30 MG: 100 INJECTION SUBCUTANEOUS at 06:17

## 2021-11-26 RX ADMIN — PANTOPRAZOLE SODIUM 40 MG: 40 TABLET, DELAYED RELEASE ORAL at 11:20

## 2021-11-26 RX ADMIN — Medication 2000 UNITS: at 11:19

## 2021-11-26 RX ADMIN — OXYCODONE HYDROCHLORIDE AND ACETAMINOPHEN 500 MG: 500 TABLET ORAL at 11:20

## 2021-11-26 RX ADMIN — Medication 50 MG: at 11:20

## 2021-11-26 RX ADMIN — ACETAMINOPHEN 650 MG: 325 TABLET ORAL at 06:22

## 2021-11-26 ASSESSMENT — ENCOUNTER SYMPTOMS
DIARRHEA: 1
PHOTOPHOBIA: 0
COUGH: 1
VOMITING: 0
CONSTIPATION: 0
SHORTNESS OF BREATH: 1
ABDOMINAL PAIN: 0
RHINORRHEA: 0
SORE THROAT: 0
ABDOMINAL DISTENTION: 0
CHEST TIGHTNESS: 1
NAUSEA: 0

## 2021-11-26 ASSESSMENT — PAIN SCALES - GENERAL
PAINLEVEL_OUTOF10: 1
PAINLEVEL_OUTOF10: 5
PAINLEVEL_OUTOF10: 0
PAINLEVEL_OUTOF10: 3
PAINLEVEL_OUTOF10: 0

## 2021-11-26 ASSESSMENT — PAIN DESCRIPTION - LOCATION
LOCATION: ABDOMEN
LOCATION: BACK

## 2021-11-26 NOTE — ED NOTES
Bedside report received from [facility]. First contact with patient, patient is lying on left side with high flow oxygen in nose. Respirations easy and unlabored. No distress noted. Patient appears to be sleeping. Lights turned down for comfort. Wife at bedside. Call light in reach.      Keith Garcia RN  11/26/21 5319

## 2021-11-26 NOTE — PROGRESS NOTES
Pt arrived to the unit. VS wnl for pt. Pt acclimated to room and given call light. No concerns noted.  Dr notified of arrival.

## 2021-11-26 NOTE — ED NOTES
Pt transported to [facility] by cart in stable condition. Called [facility] and informed them that the patient was on their way to the unit.       Andrew Leary LPN  28/83/64 [id number]

## 2021-11-26 NOTE — ED PROVIDER NOTES
[address] [address]          Pt Name: Adryan Vera  MRN: [id number]  [place] 1980  Date of evaluation: 11/25/2021  Treating Resident Physician: Andree Perez MD  Supervising Physician: Tatiana Costello       Chief Complaint   Patient presents with    Shortness of Breath     History obtained from the patient      HISTORY OF PRESENT ILLNESS    HPI  Adryan Vera is a 46 y.o. male who presents to the emergency department for evaluation of shortness of breath. Patient has been tested multiple times in various institutions, did have one positive Covid test.  The remaining have been negative. Recently was admitted at [facility] for hypoxic respiratory failure, discharged yesterday on home oxygen of 1 to 4 L. Patient having increasing oxygen demand, Wife able to get him on 10 L at home, still desatting. On presentation, patient satting 75% on room air. Complaining of pleuritic chest pain, shortness of breath, fatigue, myalgias, arthralgias, fever, chills. Patient had been discharged home on antibiotics as well. The patient has no other acute complaints at this time. REVIEW OF SYSTEMS   Review of Systems   Constitutional: Positive for appetite change, chills, diaphoresis, fatigue and fever. HENT: Negative for congestion, rhinorrhea and sore throat. Eyes: Negative for photophobia and visual disturbance. Respiratory: Positive for cough, chest tightness and shortness of breath. Cardiovascular: Positive for chest pain. Negative for palpitations and leg swelling. Gastrointestinal: Positive for diarrhea. Negative for abdominal distention, abdominal pain, constipation, nausea and vomiting. Endocrine: Positive for cold intolerance. Genitourinary: Positive for decreased urine volume. Negative for dysuria, frequency and urgency. Musculoskeletal: Positive for arthralgias and myalgias.  Negative for neck pain and neck stiffness. Skin: Negative. Neurological: Positive for light-headedness and headaches. Negative for dizziness, tremors, seizures, syncope, speech difficulty, weakness and numbness.           PAST MEDICAL AND SURGICAL HISTORY     Past Medical History:   Diagnosis Date    Heart murmur     Hypotension 2018    POTS    POTS (postural orthostatic tachycardia syndrome)     Thyroid disease 08/21/2018     Past Surgical History:   Procedure Laterality Date    PAIN MANAGEMENT PROCEDURE Bilateral 4/30/2021    Bilateral L-facet MBB @ L4-5 and L5-S1. performed by Molly Tolliver MD at [facility] 113 Bilateral 6/4/2021    Lumbar Facet MBB @L4-5, 5-S1 bilateral #2 performed by Molly Tolliver MD at [address] [place] [address]  2003         MEDICATIONS     Current Facility-Administered Medications:     ascorbic acid (VITAMIN C) tablet 500 mg, 500 mg, Oral, Daily, Schuyler Leach MD    aspirin chewable tablet 81 mg, 81 mg, Oral, Daily, Schuyler Leach MD    levothyroxine (SYNTHROID) tablet 25 mcg, 25 mcg, Oral, Daily, Schuyler Leach MD    pantoprazole (PROTONIX) tablet 40 mg, 40 mg, Oral, QAM AC, Schuyler Leach MD    zinc sulfate (ZINCATE) capsule 50 mg, 50 mg, Oral, Daily, Schuyler Leach MD    sodium chloride flush 0.9 % injection 5-40 mL, 5-40 mL, IntraVENous, 2 times per day, Schuyler Leach MD    sodium chloride flush 0.9 % injection 5-40 mL, 5-40 mL, IntraVENous, PRN, Schuyler Leach MD    0.9 % sodium chloride infusion, 25 mL, IntraVENous, PRN, Schuyler Leach MD    enoxaparin (LOVENOX) injection 30 mg, 30 mg, SubCUTAneous, Q12H, Schuyler Leach MD    ondansetron (ZOFRAN-ODT) disintegrating tablet 4 mg, 4 mg, Oral, Q8H PRN **OR** ondansetron (ZOFRAN) injection 4 mg, 4 mg, IntraVENous, Q6H PRN, Schuyler Leach MD    polyethylene glycol (GLYCOLAX) packet 17 g, 17 g, Oral, Daily PRN, Schuyler Leach MD    acetaminophen Abdomen is soft. Tenderness: There is no abdominal tenderness. There is no guarding or rebound. Musculoskeletal:      Cervical back: Normal range of motion and neck supple. Right lower leg: Tenderness present. No edema. Left lower leg: Tenderness present. No edema. Lymphadenopathy:      Cervical: No cervical adenopathy. Skin:     General: Skin is warm. Capillary Refill: Capillary refill takes less than 2 seconds. Neurological:      General: No focal deficit present. Mental Status: He is alert. MEDICAL DECISION MAKING   Initial Assessment and Plan:    This is a 79-year-old male, history of pots, tested positive for Covid at [facility] after several negative test. After that was admitted here with negative Covid test. Discharge yesterday on 2 L nasal cannula. Had increased oxygen requirements, presented at 75% on room air. Rales bilaterally, ill-appearing, diaphoretic, tachypneic. Labs significant for Covid negative, normal lactate, elevated inflammatory markers, elevated procalcitonin. CTA negative for clots, is significant for infiltrates. Would like to admit patient for hypoxic respiratory failure. Is on high flow satting 96%. Given Decadron, Zosyn, Vanco, Toradol, 30 cc/kg bolus.         ED RESULTS   Laboratory results:  Labs Reviewed   CBC WITH AUTO DIFFERENTIAL - Abnormal; Notable for the following components:       Result Value    Lymphocytes Absolute 0.6 (*)     Monocytes Absolute 0.2 (*)     Immature Grans (Abs) 0.27 (*)     All other components within normal limits   BLOOD GAS, VENOUS - Abnormal; Notable for the following components:    PH MIXED 7.44 (*)     PCO2, MIXED VENOUS 37 (*)     PO2, Mixed 24 (*)     All other components within normal limits   BASIC METABOLIC PANEL W/ REFLEX TO MG FOR LOW K - Abnormal; Notable for the following components:    Sodium 134 (*)     CO2 18 (*)     Calcium 7.6 (*)     All other components within normal limits PROCALCITONIN - Abnormal; Notable for the following components:    Procalcitonin 0.36 (*)     All other components within normal limits   OSMOLALITY - Abnormal; Notable for the following components:    Osmolality Calc 270.8 (*)     All other components within normal limits   BLOOD GAS, VENOUS - Abnormal; Notable for the following components:    PH MIXED 7.44 (*)     PCO2, MIXED VENOUS 33 (*)     PO2, Mixed 60 (*)     HCO3, Mixed 22 (*)     All other components within normal limits   C-REACTIVE PROTEIN - Abnormal; Notable for the following components:    CRP 9.89 (*)     All other components within normal limits   FERRITIN - Abnormal; Notable for the following components:    Ferritin 1,659 (*)     All other components within normal limits   LACTATE DEHYDROGENASE - Abnormal; Notable for the following components:     (*)     All other components within normal limits   URINE WITH REFLEXED MICRO - Abnormal; Notable for the following components:    Specific Gravity, Urine > 1.030 (*)     Protein, UA 30 (*)     All other components within normal limits   COVID-19, RAPID   RESPIRATORY VIRUS ANTIGENS PANEL   CULTURE, BLOOD 1   CULTURE, BLOOD 2   LEGIONELLA ANTIGEN, URINE   CULTURE, RESPIRATORY   LACTATE, SEPSIS   LACTATE, SEPSIS   SPECIMEN REJECTION   BRAIN NATRIURETIC PEPTIDE   TROPONIN   SCAN OF BLOOD SMEAR   ANION GAP   GLOMERULAR FILTRATION RATE, ESTIMATED       Radiologic studies results:  CTA CHEST W WO CONTRAST - r/o Pulmonary Embolism   Final Result      1. Extensive bilateral pneumonia. Worse appearance of the chest when compared to the previous CT scan. 2. No pulmonary moles. **This report has been created using voice recognition software. It may contain minor errors which are inherent in voice recognition technology. **      Final report electronically signed by Dr Lagunas Found on 11/25/2021 7:09 PM      XR CHEST PORTABLE   Final Result   1.  Diffuse airspace opacities seen bilaterally relating to underlying infectious etiology. **This report has been created using voice recognition software. It may contain minor errors which are inherent in voice recognition technology. **      Final report electronically signed by Dr Cora Puente on 11/25/2021 3:44 PM          ED Medications administered this visit:   Medications   ascorbic acid (VITAMIN C) tablet 500 mg (has no administration in time range)   aspirin chewable tablet 81 mg (has no administration in time range)   levothyroxine (SYNTHROID) tablet 25 mcg (has no administration in time range)   pantoprazole (PROTONIX) tablet 40 mg (has no administration in time range)   zinc sulfate (ZINCATE) capsule 50 mg (has no administration in time range)   sodium chloride flush 0.9 % injection 5-40 mL (has no administration in time range)   sodium chloride flush 0.9 % injection 5-40 mL (has no administration in time range)   0.9 % sodium chloride infusion (has no administration in time range)   enoxaparin (LOVENOX) injection 30 mg (has no administration in time range)   ondansetron (ZOFRAN-ODT) disintegrating tablet 4 mg (has no administration in time range)     Or   ondansetron (ZOFRAN) injection 4 mg (has no administration in time range)   polyethylene glycol (GLYCOLAX) packet 17 g (has no administration in time range)   acetaminophen (TYLENOL) tablet 650 mg (has no administration in time range)     Or   acetaminophen (TYLENOL) suppository 650 mg (has no administration in time range)   dexamethasone (DECADRON) injection 6 mg (has no administration in time range)   Vitamin D (CHOLECALCIFEROL) tablet 2,000 Units (has no administration in time range)   baricitinib (OLUMIANT) tablet 4 mg (has no administration in time range)   0.9 % sodium chloride IV bolus 1,914 mL (0 mL/kg × 63.8 kg (Ideal) IntraVENous Stopped 11/25/21 1550)   ketorolac (TORADOL) injection 15 mg (15 mg IntraVENous Given 11/25/21 1533)   piperacillin-tazobactam (ZOSYN) 4,500 mg in dextrose 5 % 100 mL IVPB (mini-bag) (0 mg IntraVENous Stopped 11/25/21 2042)   vancomycin (VANCOCIN) 2,250 mg in dextrose 5 % 500 mL IVPB (0 mg/kg × 93.4 kg IntraVENous Stopped 11/26/21 0043)   iopamidol (ISOVUE-370) 76 % injection 80 mL (80 mLs IntraVENous Given 11/25/21 1841)   dexamethasone (DECADRON) injection 6 mg (6 mg IntraVENous Given 11/25/21 2152)         ED COURSE          MEDICATION CHANGES     New Prescriptions    No medications on file         FINAL DISPOSITION     Final diagnoses:   Acute respiratory failure with hypoxia (HCC)     Condition: stable  Dispo: admit to hospitalist    This transcription was electronically signed. Parts of this transcriptions may have been dictated by use of voice recognition software and electronically transcribed, and parts may have been transcribed with the assistance of an ED scribe. The transcription may contain errors not detected in proofreading. Please refer to my supervising physician's documentation if my documentation differs.     Electronically Signed: Wilfrido Medellin MD, 11/26/21, 2:40 AM          Wilfrido Medellin MD  Resident  11/26/21 8546

## 2021-11-26 NOTE — ED NOTES
Perfect serve Dr. Nicolasa Delgado; order received for 75mcg levothyroxine updated.      Traci Campoverde RN  11/26/21 [id number]

## 2021-11-26 NOTE — H&P
Patient: Jordyn Kendrick    Unit/Bed: 11/011A    YOB: 1980    MRN: [id number]    Acct: [de-identified]     Admitting Diagnosis: Pneumonia due to severe acute respiratory syndrome coronavirus 2 (SARS-CoV-2) [U07.1, J12.82]    Admit Date:  11/25/2021    CC: Shortness of breath and hypoxemia    HPI :  82-year-old male patient recent diagnosed with Covid pneumonia and acute hypoxic respiratory failure. Being readmitted today because of worsening shortness of breath and hypoxemia at home. Patient was discharged home on home oxygen nasal cannula 1 to 4 L. Clinically stable at the time of interview. ED treatment included Toradol 15 mg IV x1 dose, Zosyn and vancomycin IV, Decadron 6 mg IV and heated/humidified high flow oxygen supplement. Review of Systems   Constitutional: Positive for activity change. Negative for appetite change, chills, diaphoresis, fatigue, fever and unexpected weight change. HENT: Negative for congestion, dental problem, drooling, ear discharge, ear pain, facial swelling, hearing loss, mouth sores, nosebleeds, postnasal drip, rhinorrhea, sinus pressure, sinus pain, sneezing, sore throat, tinnitus, trouble swallowing and voice change. Eyes: Negative for photophobia, pain, discharge, redness, itching and visual disturbance. Respiratory: Positive for cough and shortness of breath. Negative for apnea, choking, chest tightness, wheezing and stridor. Cardiovascular: Negative for chest pain, palpitations and leg swelling. Gastrointestinal: Negative for abdominal distention, abdominal pain, anal bleeding, blood in stool, constipation, diarrhea, nausea, rectal pain and vomiting. Endocrine: Negative for cold intolerance, heat intolerance, polydipsia, polyphagia and polyuria.    Genitourinary: Negative for decreased urine volume, difficulty urinating, dysuria, enuresis, flank pain, frequency, genital sores, hematuria, penile discharge, penile pain, penile swelling, scrotal swelling, testicular pain and urgency. Musculoskeletal: Negative for arthralgias, back pain, gait problem, joint swelling, myalgias, neck pain and neck stiffness. Skin: Negative for color change, pallor, rash and wound. Allergic/Immunologic: Negative for environmental allergies, food allergies and immunocompromised state. Neurological: Negative for dizziness, tremors, seizures, syncope, facial asymmetry, speech difficulty, weakness, light-headedness, numbness and headaches. Hematological: Negative for adenopathy. Does not bruise/bleed easily. Psychiatric/Behavioral: Negative for agitation, behavioral problems, confusion, decreased concentration, dysphoric mood, hallucinations, self-injury, sleep disturbance and suicidal ideas. The patient is not nervous/anxious and is not hyperactive. Past Medical History:        Diagnosis Date    Heart murmur     Hypotension 2018    POTS    POTS (postural orthostatic tachycardia syndrome)     Thyroid disease 08/21/2018       Past Surgical History:        Procedure Laterality Date    PAIN MANAGEMENT PROCEDURE Bilateral 4/30/2021    Bilateral L-facet MBB @ L4-5 and L5-S1. performed by Lilly Fall MD at [address] [address] Bilateral 6/4/2021    Lumbar Facet MBB @L4-5, 5-S1 bilateral #2 performed by Lilly Fall MD at [address] [place] [address]  2003       Medications Prior to Admission:    Prior to Admission medications    Medication Sig Start Date End Date Taking?  Authorizing Provider   aspirin 81 MG chewable tablet Take 1 tablet by mouth daily 11/25/21   Dorina Vasquez MD   aluminum & magnesium hydroxide-simethicone (MAALOX) 359-868-09 MG/5ML SUSP suspension Take 30 mLs by mouth every 6 hours as needed for Indigestion 11/24/21   Dorina Vasquez MD   calcium carbonate (TUMS) 500 MG chewable tablet Take 2 tablets by mouth 3 times daily as needed for Heartburn 11/24/21 12/24/21 Nieves Nation MD   pantoprazole (PROTONIX) 40 MG tablet Take 1 tablet by mouth every morning (before breakfast) 11/24/21   Nieves Nation MD   LORazepam (ATIVAN) 1 MG tablet Take 1 tablet by mouth every 4 hours as needed for Anxiety for up to 30 days. 11/24/21 12/24/21  Nieves Nation MD   dexamethasone (DECADRON) 6 MG tablet Take 1 tablet by mouth daily for 6 days 11/24/21 11/30/21  Nieves Nation MD   melatonin 3 MG TABS tablet Take 2 tablets by mouth nightly as needed (sleep) 11/24/21   Nieves Nation MD   zinc sulfate (ZINCATE) 220 (50 Zn) MG capsule Take 1 capsule by mouth daily 11/25/21   Nieves Nation MD   ascorbic acid (VITAMIN C) 500 MG tablet Take 1 tablet by mouth daily 11/25/21   Nieves Nation MD   azithromycin (ZITHROMAX) 500 MG tablet Take 1 tablet by mouth daily for 5 days 11/24/21 11/29/21  Nieves Nation MD   midodrine (PROAMATINE) 5 MG tablet Take 5 mg by mouth 3 times daily    Historical Provider, MD   levothyroxine (SYNTHROID) 25 MCG tablet Take 25 mcg by mouth Daily    Historical Provider, MD   vitamin D (CHOLECALCIFEROL) 1000 UNIT TABS tablet Take 1,000 Units by mouth daily    Historical Provider, MD   sildenafil (REVATIO) 20 MG tablet Take 1 tablet by mouth as needed (ED) 9/17/18   DURGA Calle - CNP       Allergies:    Codeine and Azithromycin    Social History:    TOBACCO:   reports that he quit smoking about 5 years ago. He has never used smokeless tobacco.    ETOH:   reports no history of alcohol use.     Family History:        Problem Relation Age of Onset    Cancer Father     Cancer Maternal Grandfather        Objective:   BP (!) 101/55   Pulse 69   Temp 101.4 °F (38.6 °C)   Resp 25   Ht 5' 6\" (1.676 m)   Wt 206 lb (93.4 kg)   SpO2 93%   BMI 33.25 kg/m²       Intake/Output Summary (Last 24 hours) at 11/25/2021 2246  Last data filed at 11/25/2021 2042  Gross per 24 hour   Intake 2014 ml   Output --   Net 2014 ml       Physical Exam  Vitals and nursing note reviewed. Constitutional:       General: He is not in acute distress. Appearance: Normal appearance. He is obese. He is not ill-appearing, toxic-appearing or diaphoretic. HENT:      Head: Normocephalic and atraumatic. Right Ear: External ear normal.      Left Ear: External ear normal.      Nose: Nose normal. No congestion or rhinorrhea. Mouth/Throat:      Mouth: Mucous membranes are moist.      Pharynx: Oropharynx is clear. No oropharyngeal exudate or posterior oropharyngeal erythema. Eyes:      General: No scleral icterus. Right eye: No discharge. Left eye: No discharge. Extraocular Movements: Extraocular movements intact. Conjunctiva/sclera: Conjunctivae normal.      Pupils: Pupils are equal, round, and reactive to light. Neck:      Vascular: No carotid bruit. Cardiovascular:      Rate and Rhythm: Normal rate and regular rhythm. Pulses: Normal pulses. Heart sounds: Normal heart sounds. No murmur heard. No friction rub. No gallop. Pulmonary:      Effort: Pulmonary effort is normal. No respiratory distress. Breath sounds: Normal breath sounds. No stridor. No wheezing, rhonchi or rales. Chest:      Chest wall: No tenderness. Abdominal:      General: Bowel sounds are normal. There is no distension. Palpations: Abdomen is soft. There is no mass. Tenderness: There is no abdominal tenderness. There is no right CVA tenderness, left CVA tenderness, guarding or rebound. Hernia: No hernia is present. Musculoskeletal:         General: No swelling, tenderness, deformity or signs of injury. Normal range of motion. Cervical back: Normal range of motion and neck supple. No rigidity or tenderness. Right lower leg: No edema. Left lower leg: No edema. Lymphadenopathy:      Cervical: No cervical adenopathy. Skin:     General: Skin is warm and dry. Coloration: Skin is not jaundiced or pale.       Findings: No bruising, erythema, lesion or rash. Neurological:      General: No focal deficit present. Mental Status: He is alert and oriented to person, place, and time. Mental status is at baseline. Cranial Nerves: No cranial nerve deficit. Sensory: No sensory deficit. Motor: No weakness. Coordination: Coordination normal.      Gait: Gait normal.      Deep Tendon Reflexes: Reflexes normal.   Psychiatric:         Mood and Affect: Mood normal.         Behavior: Behavior normal.         Thought Content: Thought content normal.         Judgment: Judgment normal.     :    Medications:    vancomycin  25 mg/kg IntraVENous Once    [START ON 11/26/2021] ascorbic acid  500 mg Oral Daily    [START ON 11/26/2021] aspirin  81 mg Oral Daily    [START ON 11/26/2021] levothyroxine  25 mcg Oral Daily    [START ON 11/26/2021] pantoprazole  40 mg Oral QAM AC    [START ON 11/26/2021] zinc sulfate  50 mg Oral Daily    sodium chloride flush  5-40 mL IntraVENous 2 times per day    enoxaparin  30 mg SubCUTAneous Q12H    dexamethasone  6 mg IntraVENous Q24H    [START ON 11/26/2021] Vitamin D  2,000 Units Oral Daily    baricitinib  4 mg Oral Once       Continuous Infusions:   sodium chloride         PRN Meds:    Data:    CBC:   Recent Labs     11/25/21  1500   WBC 7.2   RBC 5.33   HGB 16.3   HCT 46.7   MCV 87.6          BMP:   Recent Labs     11/23/21  0412 11/25/21  1634    134*   K 4.5 3.9    105   CO2 23 18*   BUN 24* 20   CREATININE 0.8 0.9       PT/INR: No results for input(s): PROTIME, INR in the last 72 hours. LIVER PROFILE: No results for input(s): AST, ALT, ALB, BILIDIR, BILITOT, ALKPHOS in the last 72 hours. ABG. None. URINALYSIS. Results for Supriya Price (MRN [id number]) as of 11/25/2021 22:41   Ref.  Range 11/25/2021 21:12   Color, UA Latest Ref Range: STRAW-YELLOW  YELLOW   Glucose, UA Latest Ref Range: NEGATIVE mg/dl NEGATIVE   Bilirubin, Urine Latest Ref Range: NEGATIVE  NEGATIVE   Ketones, Urine Latest Ref Range: NEGATIVE  NEGATIVE   Blood, Urine Latest Ref Range: NEGATIVE  NEGATIVE   pH, UA Latest Ref Range: 5.0 - 9.0  6.5   Protein, UA Latest Ref Range: NEGATIVE  30 (A)   Urobilinogen, Urine Latest Ref Range: 0.0 - 1.0 eu/dl 1.0   Nitrite, Urine Latest Ref Range: NEGATIVE  NEGATIVE   Leukocyte Esterase, Urine Latest Ref Range: NEGATIVE  NEGATIVE   Casts UA Latest Ref Range: NONE SEEN /lpf NONE SEEN   CASTS 2 Latest Ref Range: NONE SEEN /lpf NONE SEEN   WBC, UA Latest Ref Range: 0-4/hpf /hpf 0-2   RBC, UA Latest Ref Range: 0-2/hpf /hpf 3-5   Epithelial Cells, UA Latest Ref Range: 3-5/hpf /hpf 0-2   Renal Epithelial, UA Latest Ref Range: NONE SEEN  NONE SEEN   Bacteria, UA Latest Ref Range: FEW/NONE SEEN /hpf NONE SEEN   Yeast, Urine Latest Ref Range: NONE SEEN  NONE SEEN   Crystals, UA Latest Ref Range: NONE SEEN  NONE SEEN   Character, Urine Latest Ref Range: CLEAR-SL CLOUD  CLEAR   Specific Gravity, Urine Latest Ref Range: 1.002 - 1.030  > 1.030 (A)   MISCELLANEOUS 2 Unknown NONE SEEN       SEROLOGY  None. TUMOR MARKERS. None. MICROBIOLOGY   None. HISTOPATHOLOGY. None. TOXICOLOGY. None. ENDOSCOPE STUDIES. None. PROCEDURES. None. IR PROCEDURES. None. RADIOLOGY. CTA chest-11/25/2021. FINDINGS:    The thyroid gland has a normal appearance.     The central airways are patent.     The heart is normal in size. There is no pericardial or pleural effusion.     The thoracic aorta is normal in caliber without aneurysmal dilatation.     The pulmonary arteries are adequately opacified. There are no pulmonary emboli.     Enlarged lymph nodes are in the mediastinum.     There are diffuse pneumonic infiltrates throughout both lungs. There is no pneumothorax.     The spleen is enlarged measuring 13.9 cm.     The bones are intact.     IMPRESSION:  1. Extensive bilateral pneumonia.          Worse appearance of the chest when compared to the previous CT scan. 2. No pulmonary moles. CTA chest-11/20/2021. FINDINGS:  The thyroid gland is present.     The central airways are patent.     There is bilateral gynecomastia.     The heart is normal in size. There is no pericardial effusion.     The pulmonary arteries are adequately opacified. There are no pulmonary emboli.     There are enlarged lymph nodes in the mediastinum.     There are diffuse groundglass and nodular opacities throughout the lungs   bilaterally, predominantly in a peripheral distribution.     There is no pleural effusion or pneumothorax.     The spleen is enlarged measuring 14 cm.     The bones are intact.     IMPRESSION:  1. Extensive bilateral pneumonia. 2. Splenomegaly. 3. No evidence of a pulmonary embolism. ASSESSMENT AND  PLAN. 1.PULMONARY. COVID-19 pneumonia with acute acute hypoxic story failure-O2 supplement,     IV Decadron, Olumiant ,as needed nebs       2. CARDIOVASCULAR. H/o POTS and hypotension-prn midodrine. 3.GI. Pantoprazole for GI prophylaxis. Splenomegaly. 4. ENDO. H/o thyroid disease. TSH and A1c check. 5.NUTRITION. Obesity with a BMI 33.25-needs to exercise, diet control for weight loss management. 6.DISPO. Planned d/c to home vs rehab soon.       Electronically signed by Lowell Jhaveri MD on 11/25/2021 at 10:46 PM

## 2021-11-26 NOTE — PROGRESS NOTES
Spoke to wife Raysa Arias. She was very upset about pts care by physicians. Stated he was missing antibiotics and has extreme pneumonia that is not being treated. This nurse stated that she will inform the oncoming nurse that she wishes to speak to the evening doctor about her issues. This nurse informed her that the previous doctor has left for the evening and the next doctor should be able to update her just the same and answer her questions.

## 2021-11-26 NOTE — ED NOTES
ED nurse-to-nurse bedside report    Chief Complaint   Patient presents with    Shortness of Breath      LOC: alert and orientated to name, place, date  Vital signs   Vitals:    11/26/21 0432 11/26/21 0619 11/26/21 0731 11/26/21 0735   BP: (!) 100/55 109/71 (!) 95/53    Pulse: 94 63 50    Resp: 25 27 13 18   Temp:  98.2 °F (36.8 °C)     SpO2: 92% 95% 98% 97%   Weight:       Height:          Pain:    Pain Interventions: Tylenol  Pain Goal:   Oxygen: Yes    Current needs required high flow   Telemetry: Yes  LDAs:   Peripheral IV 11/25/21 Left Antecubital (Active)   Site Assessment Clean; Dry; Intact 11/25/21 1520   Dressing Status Clean; Dry; Intact 11/25/21 1520     Continuous Infusions:    sodium chloride       Mobility: Requires assistance * 1  [place] Fall Risk Score: No flowsheet data found.   Fall Interventions: Call light in reach  Report given to: Tena Lipscomb RN  11/26/21 0349

## 2021-11-26 NOTE — ED PROVIDER NOTES
[address] [facility] MEDICINE ATTENDING ATTESTATION      Evaluation of [facility]. Case discussed and care plan developed with resident physician. I agree with the resident physician documentation and plan as documented by her, except if my documentation differs. Patient seen, interviewed and examined by me. I reviewed the medical, surgical, family and social history, medications and allergies. I have reviewed the nursing documentation. I have reviewed the patient's vital signs and are abnormal from fever, tachypnea per my interpretation. Body mass index is 33.25 kg/m². Pulsoxymetry is abnormal per my interpretation. Brief H&P   Patient c/o recent admission/discharge for pneumonia versus suspected Covid, increase shortness of breath after discharge yesterday    Physical exam is notable for ill-appearing, lungs clear bilaterally, tachypnea      Medical Decision Making   MDM:   Patient is a 28-year-old male who presents with acute hypoxic respiratory failure secondary to Covid versus other pneumonia. Patient requiring high flow nasal cannula for oxygenation. Chest x-ray and CTA shows extensive bilateral pneumonia with worse appearance than previously seen. Patient started on broad-spectrum antibiotics due to concern for possible bacterial pneumonia. Plan:    Admit to hospitalist team    Please see the resident physician completed note for final disposition except as documented on this attestation. I have reviewed and interpreted all available lab, radiology and ekg results available at the moment. Diagnosis, treatment and disposition plans were discussed and agreed upon by patient. This transcription was electronically signed. It was dictated by use of voice recognition software and electronically transcribed. The transcription may contain errors not detected in proofreading.      I performed direct supervision and was present for the critical portion following procedures: None  Critical care time on this case: None    Electronically signed by Tracy Rolle MD on 11/25/21 at 11:59 PM CAROLINA Rolle MD  11/26/21 0004

## 2021-11-26 NOTE — ED NOTES
ED nurse-to-nurse bedside report    Chief Complaint   Patient presents with    Shortness of Breath      LOC: alert and orientated to name, place, date  Vital signs   Vitals:    11/25/21 1638 11/25/21 1810 11/25/21 1908 11/25/21 1923   BP: 105/65 109/69 100/64 100/64   Pulse: 69 61 78 78   Resp: 20 24 24 23   Temp:       SpO2: 91% 94% 91% 92%   Weight:       Height:          Pain:    Pain Interventions: Positioning, Toradol  Pain Goal: 4  Oxygen: Yes    Current needs required High Flow   Telemetry: Yes  LDAs:   Peripheral IV 11/25/21 Left Antecubital (Active)   Site Assessment Clean; Dry; Intact 11/25/21 1520   Dressing Status Clean; Dry; Intact 11/25/21 1520     Continuous Infusions:   Mobility: Requires assistance * 1  [place] Fall Risk Score: No flowsheet data found.   Fall Interventions: Hourly Rounding, Side Rails x 2  Report given to: Pollo Grady RN  11/25/21 1929

## 2021-11-26 NOTE — ED NOTES
levothryroxine held; pt rprts taking 75 mcg at home daily. Last dose yesterday. Pharmacy notified and MAR updated.      Raleigh Najjar, RN  11/26/21 7494

## 2021-11-27 LAB
ANION GAP SERPL CALCULATED.3IONS-SCNC: 9 MEQ/L (ref 8–16)
BASOPHILS # BLD: 0.2 %
BASOPHILS ABSOLUTE: 0 THOU/MM3 (ref 0–0.1)
BUN BLDV-MCNC: 17 MG/DL (ref 7–22)
C-REACTIVE PROTEIN: 14.39 MG/DL (ref 0–1)
CALCIUM SERPL-MCNC: 8.3 MG/DL (ref 8.5–10.5)
CHLORIDE BLD-SCNC: 104 MEQ/L (ref 98–111)
CO2: 23 MEQ/L (ref 23–33)
CREAT SERPL-MCNC: 1.1 MG/DL (ref 0.4–1.2)
EOSINOPHIL # BLD: 0.2 %
EOSINOPHILS ABSOLUTE: 0 THOU/MM3 (ref 0–0.4)
ERYTHROCYTE [DISTWIDTH] IN BLOOD BY AUTOMATED COUNT: 12.6 % (ref 11.5–14.5)
ERYTHROCYTE [DISTWIDTH] IN BLOOD BY AUTOMATED COUNT: 40.9 FL (ref 35–45)
FERRITIN: 2441 NG/ML (ref 22–322)
GFR SERPL CREATININE-BSD FRML MDRD: 74 ML/MIN/1.73M2
GLUCOSE BLD-MCNC: 142 MG/DL (ref 70–108)
HCT VFR BLD CALC: 44 % (ref 42–52)
HEMOGLOBIN: 14.6 GM/DL (ref 14–18)
IMMATURE GRANS (ABS): 0.2 THOU/MM3 (ref 0–0.07)
IMMATURE GRANULOCYTES: 2.2 %
LD: 777 U/L (ref 100–190)
LYMPHOCYTES # BLD: 3.2 %
LYMPHOCYTES ABSOLUTE: 0.3 THOU/MM3 (ref 1–4.8)
MCH RBC QN AUTO: 29.6 PG (ref 26–33)
MCHC RBC AUTO-ENTMCNC: 33.2 GM/DL (ref 32.2–35.5)
MCV RBC AUTO: 89.2 FL (ref 80–94)
MONOCYTES # BLD: 2 %
MONOCYTES ABSOLUTE: 0.2 THOU/MM3 (ref 0.4–1.3)
NUCLEATED RED BLOOD CELLS: 0 /100 WBC
PLATELET # BLD: 164 THOU/MM3 (ref 130–400)
PMV BLD AUTO: 11.3 FL (ref 9.4–12.4)
POTASSIUM SERPL-SCNC: 5.2 MEQ/L (ref 3.5–5.2)
RBC # BLD: 4.93 MILL/MM3 (ref 4.7–6.1)
SEG NEUTROPHILS: 92.2 %
SEGMENTED NEUTROPHILS ABSOLUTE COUNT: 8.5 THOU/MM3 (ref 1.8–7.7)
SODIUM BLD-SCNC: 136 MEQ/L (ref 135–145)
WBC # BLD: 9.2 THOU/MM3 (ref 4.8–10.8)

## 2021-11-27 PROCEDURE — 36415 COLL VENOUS BLD VENIPUNCTURE: CPT

## 2021-11-27 PROCEDURE — 80048 BASIC METABOLIC PNL TOTAL CA: CPT

## 2021-11-27 PROCEDURE — 83615 LACTATE (LD) (LDH) ENZYME: CPT

## 2021-11-27 PROCEDURE — 2580000003 HC RX 258: Performed by: INTERNAL MEDICINE

## 2021-11-27 PROCEDURE — 86140 C-REACTIVE PROTEIN: CPT

## 2021-11-27 PROCEDURE — 6360000002 HC RX W HCPCS: Performed by: INTERNAL MEDICINE

## 2021-11-27 PROCEDURE — 6370000000 HC RX 637 (ALT 250 FOR IP): Performed by: PEDIATRICS

## 2021-11-27 PROCEDURE — 82728 ASSAY OF FERRITIN: CPT

## 2021-11-27 PROCEDURE — 6370000000 HC RX 637 (ALT 250 FOR IP): Performed by: INTERNAL MEDICINE

## 2021-11-27 PROCEDURE — 2580000003 HC RX 258: Performed by: PEDIATRICS

## 2021-11-27 PROCEDURE — 99232 SBSQ HOSP IP/OBS MODERATE 35: CPT | Performed by: HOSPITALIST

## 2021-11-27 PROCEDURE — 85025 COMPLETE CBC W/AUTO DIFF WBC: CPT

## 2021-11-27 PROCEDURE — 2700000000 HC OXYGEN THERAPY PER DAY

## 2021-11-27 PROCEDURE — 6370000000 HC RX 637 (ALT 250 FOR IP): Performed by: HOSPITALIST

## 2021-11-27 PROCEDURE — 2140000000 HC CCU INTERMEDIATE R&B

## 2021-11-27 RX ORDER — SODIUM CHLORIDE 9 MG/ML
INJECTION, SOLUTION INTRAVENOUS CONTINUOUS
Status: DISCONTINUED | OUTPATIENT
Start: 2021-11-27 | End: 2021-12-02

## 2021-11-27 RX ORDER — 0.9 % SODIUM CHLORIDE 0.9 %
500 INTRAVENOUS SOLUTION INTRAVENOUS ONCE
Status: COMPLETED | OUTPATIENT
Start: 2021-11-27 | End: 2021-11-27

## 2021-11-27 RX ORDER — HYDROCODONE BITARTRATE AND ACETAMINOPHEN 5; 325 MG/1; MG/1
1.5 TABLET ORAL ONCE
Status: DISCONTINUED | OUTPATIENT
Start: 2021-11-27 | End: 2021-12-03 | Stop reason: HOSPADM

## 2021-11-27 RX ADMIN — ASPIRIN 81 MG CHEWABLE TABLET 81 MG: 81 TABLET CHEWABLE at 08:44

## 2021-11-27 RX ADMIN — SODIUM CHLORIDE 500 ML: 9 INJECTION, SOLUTION INTRAVENOUS at 06:36

## 2021-11-27 RX ADMIN — SODIUM CHLORIDE, PRESERVATIVE FREE 10 ML: 5 INJECTION INTRAVENOUS at 08:45

## 2021-11-27 RX ADMIN — OXYCODONE HYDROCHLORIDE AND ACETAMINOPHEN 500 MG: 500 TABLET ORAL at 08:44

## 2021-11-27 RX ADMIN — Medication 6 MG: at 23:20

## 2021-11-27 RX ADMIN — LEVOTHYROXINE SODIUM 75 MCG: 0.07 TABLET ORAL at 06:34

## 2021-11-27 RX ADMIN — Medication 50 MG: at 08:44

## 2021-11-27 RX ADMIN — SODIUM CHLORIDE: 9 INJECTION, SOLUTION INTRAVENOUS at 11:18

## 2021-11-27 RX ADMIN — DEXAMETHASONE SODIUM PHOSPHATE 6 MG: 4 INJECTION, SOLUTION INTRA-ARTICULAR; INTRALESIONAL; INTRAMUSCULAR; INTRAVENOUS; SOFT TISSUE at 20:44

## 2021-11-27 RX ADMIN — PANTOPRAZOLE SODIUM 40 MG: 40 TABLET, DELAYED RELEASE ORAL at 06:34

## 2021-11-27 RX ADMIN — BARICITINIB 4 MG: 2 TABLET, FILM COATED ORAL at 06:34

## 2021-11-27 RX ADMIN — ENOXAPARIN SODIUM 30 MG: 100 INJECTION SUBCUTANEOUS at 12:33

## 2021-11-27 RX ADMIN — ENOXAPARIN SODIUM 30 MG: 100 INJECTION SUBCUTANEOUS at 23:20

## 2021-11-27 RX ADMIN — Medication 2000 UNITS: at 08:41

## 2021-11-27 RX ADMIN — SODIUM CHLORIDE: 9 INJECTION, SOLUTION INTRAVENOUS at 06:36

## 2021-11-27 RX ADMIN — LORAZEPAM 1 MG: 1 TABLET ORAL at 20:43

## 2021-11-27 ASSESSMENT — PAIN SCALES - GENERAL
PAINLEVEL_OUTOF10: 0

## 2021-11-27 NOTE — PROGRESS NOTES
Patient arrived from  in bed. Vitals taken. Telemetry and O2 sensor attached. Patient assessed. Provided with water. Denies further needs.

## 2021-11-27 NOTE — PROGRESS NOTES
Hospitalist Progress Note    Patient:  Margy Babb      Unit/Bed:4B-07/007-A    YOB: 1980    MRN: [id number]       Acct: [de-identified]     PCP: Deondre Trujillo MD    Date of Admission: 11/25/2021      Subjective: Patient reports that he felt okay when he left on the day of his discharge. Patient however states that he does not remember the ride home. Wife reports that patient was having a hard time keeping his oxygenation up. Wife states that she had to call the oxygen company in order to get a concentrator to increase oxygen levels to 10 L. Despite the increase in oxygen, patient was still not able to saturate above the 90s. Wife reports that she called EMS in order to have patient taken back to the hospital.  Once patient was placed on high flow, wife states that patient started to act more like himself. Patient states he currently feels better with the high flow in place than he did on the day of discharge. No fever, no chills, no nausea, no vomiting. Medications:  Reviewed    Infusion Medications    sodium chloride 25 mL (11/26/21 2053)     Scheduled Medications    [START ON 11/27/2021] baricitinib  4 mg Oral Daily    levothyroxine  75 mcg Oral Daily    ascorbic acid  500 mg Oral Daily    aspirin  81 mg Oral Daily    pantoprazole  40 mg Oral QAM AC    zinc sulfate  50 mg Oral Daily    sodium chloride flush  5-40 mL IntraVENous 2 times per day    enoxaparin  30 mg SubCUTAneous Q12H    dexamethasone  6 mg IntraVENous Q24H    Vitamin D  2,000 Units Oral Daily     PRN Meds: LORazepam, melatonin, sodium chloride flush, sodium chloride, ondansetron **OR** ondansetron, polyethylene glycol, acetaminophen **OR** acetaminophen      Intake/Output Summary (Last 24 hours) at 11/26/2021 2244  Last data filed at 11/26/2021 2043  Gross per 24 hour   Intake 550 ml   Output 550 ml   Net 0 ml       Diet:  ADULT DIET;  Regular    Exam:  BP 99/66   Pulse 95   Temp 99 °F (37.2 °C) (Oral) Resp 22   Ht 5' 6\" (1.676 m)   Wt 198 lb 2 oz (89.9 kg)   SpO2 (!) 88% Comment: pt sitting on side of bed using urinal  BMI 31.98 kg/m²   Physical Exam  Constitutional:       Appearance: Normal appearance. HENT:      Head: Normocephalic and atraumatic. Right Ear: External ear normal.      Left Ear: External ear normal.      Nose: Nose normal.      Mouth/Throat:      Pharynx: Oropharynx is clear. Eyes:      Extraocular Movements: Extraocular movements intact. Pupils: Pupils are equal, round, and reactive to light. Cardiovascular:      Rate and Rhythm: Normal rate and regular rhythm. Pulses: Normal pulses. Heart sounds: Normal heart sounds. No murmur heard. No gallop. Pulmonary:      Effort: Pulmonary effort is normal. No respiratory distress. Breath sounds: Normal breath sounds. No wheezing, rhonchi or rales. Comments: High flow oxygen in place  Abdominal:      General: Bowel sounds are normal. There is no distension. Tenderness: There is no abdominal tenderness. Musculoskeletal:         General: No swelling. Normal range of motion. Cervical back: Normal range of motion and neck supple. Skin:     General: Skin is warm. Neurological:      General: No focal deficit present. Mental Status: He is alert. Labs:   Recent Labs     11/25/21  1500   WBC 7.2   HGB 16.3   HCT 46.7        Recent Labs     11/25/21  1634   *   K 3.9      CO2 18*   BUN 20   CREATININE 0.9   CALCIUM 7.6*     No results for input(s): AST, ALT, BILIDIR, BILITOT, ALKPHOS in the last 72 hours. No results for input(s): INR in the last 72 hours. No results for input(s): Jessika Mould in the last 72 hours.     Urinalysis:      Lab Results   Component Value Date    NITRU NEGATIVE 11/25/2021    WBCUA 0-2 11/25/2021    BACTERIA NONE SEEN 11/25/2021    RBCUA 3-5 11/25/2021    BLOODU NEGATIVE 11/25/2021    GLUCOSEU NEGATIVE 11/25/2021       Radiology:  CTA CHEST W WO CONTRAST - r/o Pulmonary Embolism   Final Result      1. Extensive bilateral pneumonia. Worse appearance of the chest when compared to the previous CT scan. 2. No pulmonary moles. **This report has been created using voice recognition software. It may contain minor errors which are inherent in voice recognition technology. **      Final report electronically signed by Dr Verna Londono on 11/25/2021 7:09 PM      XR CHEST PORTABLE   Final Result   1. Diffuse airspace opacities seen bilaterally relating to underlying infectious etiology. **This report has been created using voice recognition software. It may contain minor errors which are inherent in voice recognition technology. **      Final report electronically signed by Dr Lopez Velázquez on 11/25/2021 3:44 PM          Diet: ADULT DIET; Regular    DVT prophylaxis: [] Lovenox                                 [] SCDs                                 [] SQ Heparin                                 [] Encourage ambulation           [] Already on Anticoagulation     Disposition:    [x] Home       [] TCU       [] Rehab       [] Psych       [] SNF       [] [facility]       [] Other-    Code Status: Full Code    PT/OT Eval Status: Ordered    Assessment/Plan:    Anticipated Discharge in : To be determined    Active Hospital Problems    Diagnosis Date Noted    Pneumonia due to severe acute respiratory syndrome coronavirus 2 (SARS-CoV-2) [U07.1, J12.82] 11/25/2021     Priority: High    Acute hypoxemic respiratory failure due to COVID-19 (HCC) [U07.1, J96.01] 11/20/2021     Priority: High    Bradycardia [R00.1] 11/23/2021     Priority: Medium    Acquired hypothyroidism [E03.9] 11/21/2021     Priority: Low       1. Acute respiratory failure with hypoxia-patient noted to have an oxygen saturation of 75% on room air. Patient initially placed on a nonrebreather and later transferred to high flow oxygen.   Patient's oxygen will be weaned down as able. 2. Pneumonia secondary to COVID-19-patient first tested for Covid on 11/9/2021 with a home kit. When patient was admitted on previous admission, patient tested negative for COVID-19. Respiratory panel that was sent does show that patient is positive for COVID-19. Patient treated with dexamethasone and antibiotics in previous admission. Patient will be continued on dexamethasone and baricitinib. Patient also be given vitamin C, vitamin D and zinc.  Patient given a dose of antibiotics in the ED. We will hold off on giving any further antibiotics as patient symptoms are most like secondary to COVID-19.  3. Bradycardia-patient has had a some vitals that were taken that showed bradycardia. Patient had similar findings on previous admission. Patient's metoprolol stopped on previous admission. Patient will be continued to be monitored on telemetry. 4. Acquired hypothyroidism-patient with history of hypothyroidism. Patient will be continued on his home dose of Synthroid at 75 mcg. 5. Anxiety-patient has been having issues with anxiety during hospitalization. Patient was started on Ativan on previous admission. Patient will be continued on Ativan during hospitalization. Patient already has a prescription for Ativan on prior admission. Patient will need to follow-up with his primary doctor for further treatment for his anxiety.         Electronically signed by Bennett Iyer MD on 11/26/2021 at 10:44 PM

## 2021-11-28 LAB
ANION GAP SERPL CALCULATED.3IONS-SCNC: 10 MEQ/L (ref 8–16)
BASOPHILS # BLD: 0.1 %
BASOPHILS ABSOLUTE: 0 THOU/MM3 (ref 0–0.1)
BUN BLDV-MCNC: 16 MG/DL (ref 7–22)
C-REACTIVE PROTEIN: 7.99 MG/DL (ref 0–1)
CALCIUM SERPL-MCNC: 8.3 MG/DL (ref 8.5–10.5)
CHLORIDE BLD-SCNC: 105 MEQ/L (ref 98–111)
CO2: 23 MEQ/L (ref 23–33)
CREAT SERPL-MCNC: 1 MG/DL (ref 0.4–1.2)
EOSINOPHIL # BLD: 0.1 %
EOSINOPHILS ABSOLUTE: 0 THOU/MM3 (ref 0–0.4)
ERYTHROCYTE [DISTWIDTH] IN BLOOD BY AUTOMATED COUNT: 12.8 % (ref 11.5–14.5)
ERYTHROCYTE [DISTWIDTH] IN BLOOD BY AUTOMATED COUNT: 41.4 FL (ref 35–45)
FERRITIN: 1895 NG/ML (ref 22–322)
GFR SERPL CREATININE-BSD FRML MDRD: 83 ML/MIN/1.73M2
GLUCOSE BLD-MCNC: 136 MG/DL (ref 70–108)
HCT VFR BLD CALC: 44.3 % (ref 42–52)
HEMOGLOBIN: 14.9 GM/DL (ref 14–18)
IMMATURE GRANS (ABS): 0.13 THOU/MM3 (ref 0–0.07)
IMMATURE GRANULOCYTES: 1.5 %
LD: 648 U/L (ref 100–190)
LYMPHOCYTES # BLD: 3.4 %
LYMPHOCYTES ABSOLUTE: 0.3 THOU/MM3 (ref 1–4.8)
MCH RBC QN AUTO: 30 PG (ref 26–33)
MCHC RBC AUTO-ENTMCNC: 33.6 GM/DL (ref 32.2–35.5)
MCV RBC AUTO: 89.1 FL (ref 80–94)
MONOCYTES # BLD: 2.3 %
MONOCYTES ABSOLUTE: 0.2 THOU/MM3 (ref 0.4–1.3)
NUCLEATED RED BLOOD CELLS: 0 /100 WBC
PLATELET # BLD: 209 THOU/MM3 (ref 130–400)
PMV BLD AUTO: 11.4 FL (ref 9.4–12.4)
POTASSIUM SERPL-SCNC: 4.7 MEQ/L (ref 3.5–5.2)
RBC # BLD: 4.97 MILL/MM3 (ref 4.7–6.1)
SEG NEUTROPHILS: 92.6 %
SEGMENTED NEUTROPHILS ABSOLUTE COUNT: 8 THOU/MM3 (ref 1.8–7.7)
SODIUM BLD-SCNC: 138 MEQ/L (ref 135–145)
WBC # BLD: 8.6 THOU/MM3 (ref 4.8–10.8)

## 2021-11-28 PROCEDURE — 6370000000 HC RX 637 (ALT 250 FOR IP): Performed by: INTERNAL MEDICINE

## 2021-11-28 PROCEDURE — 2580000003 HC RX 258: Performed by: PEDIATRICS

## 2021-11-28 PROCEDURE — 85025 COMPLETE CBC W/AUTO DIFF WBC: CPT

## 2021-11-28 PROCEDURE — 86140 C-REACTIVE PROTEIN: CPT

## 2021-11-28 PROCEDURE — 2700000000 HC OXYGEN THERAPY PER DAY

## 2021-11-28 PROCEDURE — 99232 SBSQ HOSP IP/OBS MODERATE 35: CPT | Performed by: HOSPITALIST

## 2021-11-28 PROCEDURE — 6360000002 HC RX W HCPCS: Performed by: INTERNAL MEDICINE

## 2021-11-28 PROCEDURE — 2140000000 HC CCU INTERMEDIATE R&B

## 2021-11-28 PROCEDURE — 82728 ASSAY OF FERRITIN: CPT

## 2021-11-28 PROCEDURE — 6370000000 HC RX 637 (ALT 250 FOR IP): Performed by: HOSPITALIST

## 2021-11-28 PROCEDURE — 80048 BASIC METABOLIC PNL TOTAL CA: CPT

## 2021-11-28 PROCEDURE — 6360000002 HC RX W HCPCS: Performed by: HOSPITALIST

## 2021-11-28 PROCEDURE — 83615 LACTATE (LD) (LDH) ENZYME: CPT

## 2021-11-28 PROCEDURE — 94761 N-INVAS EAR/PLS OXIMETRY MLT: CPT

## 2021-11-28 PROCEDURE — 6370000000 HC RX 637 (ALT 250 FOR IP): Performed by: PHYSICIAN ASSISTANT

## 2021-11-28 PROCEDURE — 36415 COLL VENOUS BLD VENIPUNCTURE: CPT

## 2021-11-28 RX ADMIN — DEXAMETHASONE SODIUM PHOSPHATE 6 MG: 4 INJECTION, SOLUTION INTRA-ARTICULAR; INTRALESIONAL; INTRAMUSCULAR; INTRAVENOUS; SOFT TISSUE at 19:49

## 2021-11-28 RX ADMIN — ACETAMINOPHEN 650 MG: 325 TABLET ORAL at 04:54

## 2021-11-28 RX ADMIN — Medication 2000 UNITS: at 08:14

## 2021-11-28 RX ADMIN — PANTOPRAZOLE SODIUM 40 MG: 40 TABLET, DELAYED RELEASE ORAL at 04:52

## 2021-11-28 RX ADMIN — BARICITINIB 4 MG: 2 TABLET, FILM COATED ORAL at 04:52

## 2021-11-28 RX ADMIN — LEVOTHYROXINE SODIUM 75 MCG: 0.07 TABLET ORAL at 04:52

## 2021-11-28 RX ADMIN — ENOXAPARIN SODIUM 30 MG: 100 INJECTION SUBCUTANEOUS at 11:00

## 2021-11-28 RX ADMIN — ENOXAPARIN SODIUM 30 MG: 100 INJECTION SUBCUTANEOUS at 19:48

## 2021-11-28 RX ADMIN — OXYCODONE HYDROCHLORIDE AND ACETAMINOPHEN 500 MG: 500 TABLET ORAL at 08:14

## 2021-11-28 RX ADMIN — SODIUM CHLORIDE: 9 INJECTION, SOLUTION INTRAVENOUS at 00:22

## 2021-11-28 RX ADMIN — SODIUM CHLORIDE: 9 INJECTION, SOLUTION INTRAVENOUS at 13:17

## 2021-11-28 RX ADMIN — Medication 6 MG: at 22:19

## 2021-11-28 RX ADMIN — Medication 50 MG: at 08:14

## 2021-11-28 RX ADMIN — LORAZEPAM 1 MG: 1 TABLET ORAL at 19:58

## 2021-11-28 RX ADMIN — ASPIRIN 81 MG CHEWABLE TABLET 81 MG: 81 TABLET CHEWABLE at 08:14

## 2021-11-28 ASSESSMENT — PAIN SCALES - GENERAL
PAINLEVEL_OUTOF10: 3
PAINLEVEL_OUTOF10: 0
PAINLEVEL_OUTOF10: 0

## 2021-11-28 NOTE — PROGRESS NOTES
reactive to light. Cardiovascular:      Rate and Rhythm: Normal rate and regular rhythm. Pulses: Normal pulses. Heart sounds: Normal heart sounds. No murmur heard. No gallop. Pulmonary:      Effort: Pulmonary effort is normal. No respiratory distress. Breath sounds: Normal breath sounds. No wheezing, rhonchi or rales. Comments: High flow oxygen in place  Abdominal:      General: Bowel sounds are normal. There is no distension. Tenderness: There is no abdominal tenderness. Musculoskeletal:         General: No swelling. Normal range of motion. Cervical back: Normal range of motion and neck supple. Skin:     General: Skin is warm. Neurological:      General: No focal deficit present. Mental Status: He is alert. Labs:   Recent Labs     11/25/21  1500 11/27/21  0349   WBC 7.2 9.2   HGB 16.3 14.6   HCT 46.7 44.0    164     Recent Labs     11/25/21  1634 11/27/21  0349   * 136   K 3.9 5.2    104   CO2 18* 23   BUN 20 17   CREATININE 0.9 1.1   CALCIUM 7.6* 8.3*     No results for input(s): AST, ALT, BILIDIR, BILITOT, ALKPHOS in the last 72 hours. No results for input(s): INR in the last 72 hours. No results for input(s): Ardelle Chalk in the last 72 hours. Urinalysis:      Lab Results   Component Value Date    NITRU NEGATIVE 11/25/2021    WBCUA 0-2 11/25/2021    BACTERIA NONE SEEN 11/25/2021    RBCUA 3-5 11/25/2021    BLOODU NEGATIVE 11/25/2021    GLUCOSEU NEGATIVE 11/25/2021       Radiology:  CTA CHEST W WO CONTRAST - r/o Pulmonary Embolism   Final Result      1. Extensive bilateral pneumonia. Worse appearance of the chest when compared to the previous CT scan. 2. No pulmonary moles. **This report has been created using voice recognition software. It may contain minor errors which are inherent in voice recognition technology. **      Final report electronically signed by Dr Carley Moss on 11/25/2021 7:09 PM      XR CHEST PORTABLE   Final Result   1. Diffuse airspace opacities seen bilaterally relating to underlying infectious etiology. **This report has been created using voice recognition software. It may contain minor errors which are inherent in voice recognition technology. **      Final report electronically signed by Dr Ko Dhillon on 11/25/2021 3:44 PM          Diet: ADULT DIET; Regular    DVT prophylaxis: [x] Lovenox                                 [] SCDs                                 [] SQ Heparin                                 [] Encourage ambulation           [] Already on Anticoagulation     Disposition:    [x] Home       [] TCU       [] Rehab       [] Psych       [] SNF       [] [facility]       [] Other-    Code Status: Full Code    PT/OT Eval Status: On board    Assessment/Plan:    Anticipated Discharge in : To be determined    Active Hospital Problems    Diagnosis Date Noted    Pneumonia due to severe acute respiratory syndrome coronavirus 2 (SARS-CoV-2) [U07.1, J12.82] 11/25/2021     Priority: High    Acute hypoxemic respiratory failure due to COVID-19 (HCC) [U07.1, J96.01] 11/20/2021     Priority: High    Bradycardia [R00.1] 11/23/2021     Priority: Medium    Acquired hypothyroidism [E03.9] 11/21/2021     Priority: Low       1. Acute respiratory failure with hypoxia-patient noted to have an oxygen saturation of 75% on room air. Patient initially placed on a nonrebreather and later transferred to high flow oxygen. Patient's oxygen will be weaned down as able. · 11/27/2021-patient continues to be on high flow oxygen. 2. Pneumonia secondary to COVID-19-patient first tested for Covid on 11/9/2021 with a home kit. When patient was admitted on previous admission, patient tested negative for COVID-19. Respiratory panel that was sent does show that patient is positive for COVID-19. Patient treated with dexamethasone and antibiotics in previous admission.   Patient will be continued on dexamethasone and baricitinib. Patient also be given vitamin C, vitamin D and zinc.  Patient given a dose of antibiotics in the ED. We will hold off on giving any further antibiotics as patient symptoms are most like secondary to COVID-19.  · 11/27/2021-patient CRP noted to trend up from 9.89-14.39. LDH noted to trend up from 625-777. Ferritin noted to trend from 1417-7448.  3. Bradycardia-patient has had a some vitals that were taken that showed bradycardia. Patient had similar findings on previous admission. Patient's metoprolol stopped on previous admission. Patient will be continued to be monitored on telemetry. 4. Acquired hypothyroidism-patient with history of hypothyroidism. Patient will be continued on his home dose of Synthroid at 75 mcg. 5. Anxiety-patient has been having issues with anxiety during hospitalization. Patient was started on Ativan on previous admission. Patient will be continued on Ativan during hospitalization. Patient already has a prescription for Ativan on prior admission. Patient will need to follow-up with his primary doctor for further treatment for his anxiety.         Electronically signed by Shawn Carvajal MD on 11/27/2021 at 7:04 PM

## 2021-11-29 PROCEDURE — 97535 SELF CARE MNGMENT TRAINING: CPT

## 2021-11-29 PROCEDURE — 97110 THERAPEUTIC EXERCISES: CPT

## 2021-11-29 PROCEDURE — 97530 THERAPEUTIC ACTIVITIES: CPT

## 2021-11-29 PROCEDURE — 94669 MECHANICAL CHEST WALL OSCILL: CPT

## 2021-11-29 PROCEDURE — 2700000000 HC OXYGEN THERAPY PER DAY

## 2021-11-29 PROCEDURE — 6370000000 HC RX 637 (ALT 250 FOR IP): Performed by: INTERNAL MEDICINE

## 2021-11-29 PROCEDURE — 2580000003 HC RX 258: Performed by: INTERNAL MEDICINE

## 2021-11-29 PROCEDURE — 6370000000 HC RX 637 (ALT 250 FOR IP): Performed by: HOSPITALIST

## 2021-11-29 PROCEDURE — 6360000002 HC RX W HCPCS: Performed by: HOSPITALIST

## 2021-11-29 PROCEDURE — 2140000000 HC CCU INTERMEDIATE R&B

## 2021-11-29 PROCEDURE — 97166 OT EVAL MOD COMPLEX 45 MIN: CPT

## 2021-11-29 PROCEDURE — 6370000000 HC RX 637 (ALT 250 FOR IP): Performed by: PHYSICIAN ASSISTANT

## 2021-11-29 PROCEDURE — 97163 PT EVAL HIGH COMPLEX 45 MIN: CPT

## 2021-11-29 PROCEDURE — 94760 N-INVAS EAR/PLS OXIMETRY 1: CPT

## 2021-11-29 PROCEDURE — 99233 SBSQ HOSP IP/OBS HIGH 50: CPT | Performed by: HOSPITALIST

## 2021-11-29 RX ORDER — DEXAMETHASONE 1.5 MG/1
6 TABLET ORAL DAILY
Status: DISCONTINUED | OUTPATIENT
Start: 2021-11-30 | End: 2021-12-02

## 2021-11-29 RX ORDER — LANOLIN ALCOHOL/MO/W.PET/CERES
10 CREAM (GRAM) TOPICAL NIGHTLY PRN
Status: DISCONTINUED | OUTPATIENT
Start: 2021-11-29 | End: 2021-12-03 | Stop reason: HOSPADM

## 2021-11-29 RX ADMIN — ENOXAPARIN SODIUM 30 MG: 100 INJECTION SUBCUTANEOUS at 09:42

## 2021-11-29 RX ADMIN — ASPIRIN 81 MG CHEWABLE TABLET 81 MG: 81 TABLET CHEWABLE at 09:42

## 2021-11-29 RX ADMIN — BARICITINIB 4 MG: 2 TABLET, FILM COATED ORAL at 05:28

## 2021-11-29 RX ADMIN — SODIUM CHLORIDE, PRESERVATIVE FREE 10 ML: 5 INJECTION INTRAVENOUS at 09:43

## 2021-11-29 RX ADMIN — ENOXAPARIN SODIUM 30 MG: 100 INJECTION SUBCUTANEOUS at 20:13

## 2021-11-29 RX ADMIN — PANTOPRAZOLE SODIUM 40 MG: 40 TABLET, DELAYED RELEASE ORAL at 05:28

## 2021-11-29 RX ADMIN — OXYCODONE HYDROCHLORIDE AND ACETAMINOPHEN 500 MG: 500 TABLET ORAL at 09:42

## 2021-11-29 RX ADMIN — LEVOTHYROXINE SODIUM 75 MCG: 0.07 TABLET ORAL at 05:28

## 2021-11-29 RX ADMIN — Medication 50 MG: at 09:42

## 2021-11-29 RX ADMIN — LORAZEPAM 1 MG: 1 TABLET ORAL at 21:00

## 2021-11-29 RX ADMIN — Medication 2000 UNITS: at 09:42

## 2021-11-29 RX ADMIN — SODIUM CHLORIDE, PRESERVATIVE FREE 10 ML: 5 INJECTION INTRAVENOUS at 20:15

## 2021-11-29 ASSESSMENT — PAIN SCALES - GENERAL: PAINLEVEL_OUTOF10: 0

## 2021-11-29 NOTE — PROGRESS NOTES
[facility]  INPATIENT PHYSICAL THERAPY  EVALUATION  [facility] CCU-STEPDOWN 3B - 3B-21/021-A    Time In: 0715  Time Out: 2942  Timed Code Treatment Minutes: 9 Minutes  Minutes: 24          Date: 2021  Patient Name: Seb Haque,  Gender:  male        MRN: [id number]  : 1980  (46 y.o.)      Referring Practitioner: Dr. Diana Simon  Diagnosis: acute respiratory failure with hypoxia  Additional Pertinent Hx: admit with above diagnosis, COVID 19 pneumonia, bradycardia, hypothyroidism, anxiety     Restrictions/Precautions:  Restrictions/Precautions: General Precautions    Subjective:  Chart Reviewed: Yes  Patient assessed for rehabilitation services?: Yes  Subjective: pleasant, cooperative, c/o muscle cramps throughout the day, pt stated felt good to sit EOB, pt limited with RN aware due to low O2 sats with mobility    General:    Hearing: Within functional limits         Pain: no pain    Vitals: Oxygen: HFNC 60L at 61% with O2 sats initially at 91% however with mobility dec to 77% with cues for breathing technique and unable to inc activity and needed to return to sup because pt only inc to 85%, once sup pt cont to inc O2 sats to 88% with RN monitoring at nurse's station    Social/Functional History:    Lives With: Spouse (3 kids, ages 24, 25, 15)  Type of Home: House  Home Layout: Two level, Able to Live on Main level with bedroom/bathroom  Home Access: Stairs to enter without rails  Entrance Stairs - Number of Steps: 1+1  Home Equipment:  (no AD used PTA)     Ambulation Assistance: Independent  Transfer Assistance: Independent    Active : Yes  Occupation: Full time employment  Type of occupation:  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       OBJECTIVE:  Range of Motion:  Bilateral Lower Extremity: WFL    Strength:  Bilateral Lower Extremity: WFL    Balance:  Static Sitting Balance:  Supervision, inc time working on breathing technique however after around 10 min pt O2 sats only inc to 85% so pt returned to sup with RN aware  Static Standing Balance: Stand By Assistance    Bed Mobility:  Rolling to Left: Modified Independent   Rolling to Right: Modified Independent   Supine to Sit: Modified Independent  Sit to Supine: Modified Independent   Scooting: Modified Independent    Transfers:  Sit to Stand: Stand By Assistance  Stand to Sit:Stand By Assistance    Ambulation:  Stand By Assistance  Distance: 3 steps to [facility] only  Surface: Level Tile  Device:No Device  Gait Deviations:  Slow Shelley and no LOB, but unable to tolerate inc activity due to dec O2 sats with mobility-RN aware, pt stated did feel good to move though    Exercise:  Reviewed importance of use of incentive spirometer and accapella. Also PT demonstrated use of towel roll over ball of foot to do heelcord stretches throughout the day, encouraged pt to wait until rests for a while before doing stretches. Functional Outcome Measures: Completed  AM-PAC Inpatient Mobility without Stair Climbing Raw Score : 17  AM-PAC Inpatient without Stair Climbing T-Scale Score : 48.47    ASSESSMENT:  Activity Tolerance:  Patient tolerance of  treatment: fair. -      Treatment Initiated: Treatment and education initiated within context of evaluation. Evaluation time included review of current medical information, gathering information related to past medical, social and functional history, completion of standardized testing, formal and informal observation of tasks, assessment of data and development of plan of care and goals. Treatment time included skilled education and facilitation of tasks to increase safety and independence with functional mobility for improved independence and quality of life. Assessment:   Body structures, Functions, Activity limitations: Decreased functional mobility , Decreased endurance, Decreased strength  Assessment: pt on HFNC and difficulty maintaining O2 sats with mobility, generalized deconditioning, cues for breathing technique and monitoring of O2 sats, inc assist for safe mobility, recommend cont PT to inc pt I with functional mobility  Prognosis: Good    REQUIRES PT FOLLOW UP: Yes    Discharge Recommendations:  Discharge Recommendations: Continue to assess pending progress, Home independently    Patient Education:  PT Education: Goals, PT Role, Plan of Care, Functional Mobility Training  Patient Education: breathing technique    Equipment Recommendations:  Equipment Needed: No    Plan:  Times per week: 5X C  Times per day: Daily  Specific instructions for Next Treatment: therex and mobility    Goals:  Patient goals : return home  Short term goals  Time Frame for Short term goals: by discharge  Short term goal 1: bed mobility with MOD I to get in/out of bed  Short term goal 2: transfer with MOD I to get in/out of chairs  Short term goal 3: amb >150'x1 without AD and MOD I, maintaining O2 sats >90% on </=4L O2, for safe home mobility  Long term goals  Time Frame for Long term goals : no LTGs set secondary to short ELOS    Following session, patient left in safe position with all fall risk precautions in place.

## 2021-11-29 NOTE — PROGRESS NOTES
Hospitalist Progress Note    Patient:  [facility]      Unit/Bed:3B-21/021-A    YOB: 1980    MRN: [id number]       Acct: [de-identified]     PCP: Achille Severe, MD    Date of Admission: 11/25/2021      Subjective: Patient seen and examined. Patient reports that he is eating well. Patient states that he is not sleeping as well secondary to disturbances. No fever, no chills, no nausea, no vomiting. Medications:  Reviewed    Infusion Medications    sodium chloride 75 mL/hr at 11/28/21 1317    sodium chloride 25 mL (11/26/21 2053)     Scheduled Medications    enoxaparin  30 mg SubCUTAneous Q12H    HYDROcodone-acetaminophen  1.5 tablet Oral Once    baricitinib  4 mg Oral Daily    levothyroxine  75 mcg Oral Daily    ascorbic acid  500 mg Oral Daily    aspirin  81 mg Oral Daily    pantoprazole  40 mg Oral QAM AC    zinc sulfate  50 mg Oral Daily    sodium chloride flush  5-40 mL IntraVENous 2 times per day    dexamethasone  6 mg IntraVENous Q24H    Vitamin D  2,000 Units Oral Daily     PRN Meds: LORazepam, melatonin, sodium chloride flush, sodium chloride, ondansetron **OR** ondansetron, polyethylene glycol, acetaminophen **OR** acetaminophen      Intake/Output Summary (Last 24 hours) at 11/28/2021 1933  Last data filed at 11/28/2021 1550  Gross per 24 hour   Intake 930 ml   Output 2850 ml   Net -1920 ml       Diet:  ADULT DIET; Regular    Exam:  BP (!) 100/55   Pulse (!) 48   Temp 98.1 °F (36.7 °C) (Oral)   Resp 20   Ht 5' 6\" (1.676 m)   Wt 198 lb 2 oz (89.9 kg)   SpO2 94%   BMI 31.98 kg/m²   Physical Exam  Constitutional:       Appearance: Normal appearance. HENT:      Head: Normocephalic and atraumatic. Right Ear: External ear normal.      Left Ear: External ear normal.      Nose: Nose normal.      Mouth/Throat:      Pharynx: Oropharynx is clear. Eyes:      Extraocular Movements: Extraocular movements intact. Pupils: Pupils are equal, round, and reactive to light. Cardiovascular:      Rate and Rhythm: Normal rate and regular rhythm. Pulses: Normal pulses. Heart sounds: Normal heart sounds. No murmur heard. No gallop. Pulmonary:      Effort: Pulmonary effort is normal. No respiratory distress. Breath sounds: Normal breath sounds. No wheezing, rhonchi or rales. Comments: High flow oxygen in place  Abdominal:      General: Bowel sounds are normal. There is no distension. Tenderness: There is no abdominal tenderness. Musculoskeletal:         General: No swelling. Normal range of motion. Cervical back: Normal range of motion and neck supple. Skin:     General: Skin is warm. Neurological:      General: No focal deficit present. Mental Status: He is alert. Labs:   Recent Labs     11/27/21 0349 11/28/21 0407   WBC 9.2 8.6   HGB 14.6 14.9   HCT 44.0 44.3    209     Recent Labs     11/27/21 0349 11/28/21 0407    138   K 5.2 4.7    105   CO2 23 23   BUN 17 16   CREATININE 1.1 1.0   CALCIUM 8.3* 8.3*     No results for input(s): AST, ALT, BILIDIR, BILITOT, ALKPHOS in the last 72 hours. No results for input(s): INR in the last 72 hours. No results for input(s): Prentis Revels in the last 72 hours. Urinalysis:      Lab Results   Component Value Date    NITRU NEGATIVE 11/25/2021    WBCUA 0-2 11/25/2021    BACTERIA NONE SEEN 11/25/2021    RBCUA 3-5 11/25/2021    BLOODU NEGATIVE 11/25/2021    GLUCOSEU NEGATIVE 11/25/2021       Radiology:  CTA CHEST W WO CONTRAST - r/o Pulmonary Embolism   Final Result      1. Extensive bilateral pneumonia. Worse appearance of the chest when compared to the previous CT scan. 2. No pulmonary moles. **This report has been created using voice recognition software. It may contain minor errors which are inherent in voice recognition technology. **      Final report electronically signed by Dr Shaggy Acosta on 11/25/2021 7:09 PM      XR CHEST PORTABLE   Final Result   1. Diffuse airspace opacities seen bilaterally relating to underlying infectious etiology. **This report has been created using voice recognition software. It may contain minor errors which are inherent in voice recognition technology. **      Final report electronically signed by Dr Donaldo Conde on 11/25/2021 3:44 PM          Diet: ADULT DIET; Regular    DVT prophylaxis: [x] Lovenox                                 [] SCDs                                 [] SQ Heparin                                 [] Encourage ambulation           [] Already on Anticoagulation     Disposition:    [x] Home       [] TCU       [] Rehab       [] Psych       [] SNF       [] [place]       [] Other-    Code Status: Full Code    PT/OT Eval Status: On board    Assessment/Plan:    Anticipated Discharge in : To be determined    Active Hospital Problems    Diagnosis Date Noted    Pneumonia due to severe acute respiratory syndrome coronavirus 2 (SARS-CoV-2) [U07.1, J12.82] 11/25/2021     Priority: High    Acute hypoxemic respiratory failure due to COVID-19 (HCC) [U07.1, J96.01] 11/20/2021     Priority: High    Bradycardia [R00.1] 11/23/2021     Priority: Medium    Acquired hypothyroidism [E03.9] 11/21/2021     Priority: Low       1. Acute respiratory failure with hypoxia-patient noted to have an oxygen saturation of 75% on room air. Patient initially placed on a nonrebreather and later transferred to high flow oxygen. Patient's oxygen will be weaned down as able. · 11/27/2021-patient continues to be on high flow oxygen. · 11/28/2021-patient continues to be on high flow oxygen. Oxygen has been weaned down. Patient is feeling better breathing. 2. Pneumonia secondary to COVID-19-patient first tested for Covid on 11/9/2021 with a home kit. When patient was admitted on previous admission, patient tested negative for COVID-19.   Respiratory panel that was sent does show that patient is positive for

## 2021-11-29 NOTE — PROGRESS NOTES
[address]  INPATIENT OCCUPATIONAL THERAPY  [facility] CCU-STEPDOWN 3B  EVALUATION    Time:   Time In: 8703  Time Out: 1402  Timed Code Treatment Minutes: 45 Minutes  Minutes: 48          Date: 2021  Patient Name: Adryan Vera,   Gender: male      MRN: [id number]  : 1980  (46 y.o.)  Referring Practitioner: Peewee Cook MD  Diagnosis: Acute hypoxemic respiratory failure due to COVID-19  Additional Pertinent Hx: Per H&P:46year-old male patient recent diagnosed with Covid pneumonia and acute hypoxic respiratory failure. Being readmitted today because of worsening shortness of breath and hypoxemia at home. Patient was discharged home on home oxygen nasal cannula 1 to 4 L. Restrictions/Precautions:  Restrictions/Precautions: General Precautions  Position Activity Restriction  Other position/activity restrictions: HFNC    Subjective  Chart Reviewed: Yes, Orders, Progress Notes, History and Physical  Patient assessed for rehabilitation services?: Yes  Family / Caregiver Present: No    Subjective: Pt seated in bed upon arrival, agreeable to OT session and motivated to participate. Pt stated throughout \"I feel so much better when I am doing stuff and moving around. This is what I needed. \" Pt expresses frustrations with requiring re-hospitalization and felt he was discharged too early (was home for <24 hours.)    Pain:  Pain Assessment  Patient Currently in Pain: Denies    Vitals: Oxygen: 94% on HFNC at 60 LPM and Fio2 of 56%. Sp02 decreased primarily when talking with activity. Lowest reading at 81% with prolonged standing/walking. Primarily ranged from 86-90% throughout session with activity. With exercises while standing, pt was able to achieve 93% at highest. When Sp02 decreased to <90%, pt required at most ~3-4 minutes for Sp02 to recover to 90%.    Heart Rate: 64 bpm; increased to 100s at highest when standing >5 minutes    Social/Functional History:  Lives With: Spouse (3 kids, ages 24, 18, 14)  Type of Home: House  Home Layout: Two level, Able to Live on Main level with bedroom/bathroom  Home Access: Stairs to enter without rails  Entrance Stairs - Number of Steps: 1+1  Home Equipment: Oxygen (was discharged home with 1L O2 at rest, 4L with activity)   Bathroom Equipment: Shower chair       ADL Assistance: Independent  Homemaking Assistance: Independent  Ambulation Assistance: Independent  Transfer Assistance: Independent    Active : Yes  Occupation: Full time employment  Type of occupation:  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 Pt reported will have close to 24/7 supervision arranged between family members at discharge. VISION:WFL    HEARING:  WFL    COGNITION: WFL    RANGE OF MOTION:  Bilateral Upper Extremity:  WFL    STRENGTH:  Bilateral Upper Extremity:  WFL    ADL:   Grooming: Supervision and with set-up.  standing to brush teeth (unable to stand sinkside due to limited by HFNC tubing). Pt tolerated standing ~3 minutes to complete, Sp02 ranging 88-90% while completing. BALANCE:  Sitting Balance:  Independent. At EOB for ~8 minutes before transfers completed, Sp02 had decreased to 83% initially while seated at EOB although recovered to 90% within ~2 minutes  Standing Balance: Supervision. Pt tolerated standing ~3 minutes, ~13 minutes, and ~5 minutes. Dynamic reach outside JENNIFER without LOB. BED MOBILITY:  Supine to Sit: Modified Independent    Sit to Supine: Modified Independent    Scooting: Modified Independent      TRANSFERS:  Sit to Stand:  Modified Independent. Stand to Sit: Modified Independent. FUNCTIONAL MOBILITY:  Assistive Device: None  Assist Level:  Supervision. Distance: steps forward/backward, laterally  Steady without LOB. Minimal cues for pursed lip breathing with pt demoing good carryover.      Exercise:  Pt completed BUE AROM exercises, marching, sidestepping, squatting all while standing. Pt completed 1 set X 10 repetitions in all planes with short rest breaks in between variations. Exercises completed to increase overall strength/endurance needed for ADLs and transfers. Pt provided with max resistance theraband to utilize for UB HEP in subsequent days, encouraged pt to continue completing 2-3x/day and progress as able. Encouraged pt to continue progressing with standing/mobility tolerance and recommendations given to complete standing activities when nurses/techs present in room to complete assessment, give meds, etc.       Activity Tolerance:  Patient tolerance of  treatment: good. Assessment:  Assessment: Pt presents requiring increased assistance for ADLs, transfers, and functional mobility compared to PLOF. Pt will continue to benefit from OT services to improve independence with these tasks, in addition to overall strength/endurance to facilitate return to PLOF. Performance deficits / Impairments: Decreased functional mobility , Decreased ADL status, Decreased endurance, Decreased high-level IADLs  Prognosis: Good  REQUIRES OT FOLLOW UP: Yes  Decision Making: Medium Complexity    Treatment Initiated: Treatment and education initiated within context of evaluation. Evaluation time included review of current medical information, gathering information related to past medical, social and functional history, completion of standardized testing, formal and informal observation of tasks, assessment of data and development of plan of care and goals. Treatment time included skilled education and facilitation of tasks to increase safety and independence with ADL's for improved functional independence and quality of life.     Discharge Recommendations:  Home with assist PRN    Patient Education:  OT Education: OT Role, Plan of Care, Home Exercise Program, ADL Adaptive Strategies, Energy Conservation (importance of increasing activity (i.e. HEP multiple times throughout day, standing for activity when staff in room))    Equipment Recommendations:  Equipment Needed: No    Plan:  Times per week: 5x  Current Treatment Recommendations: Strengthening, Functional Mobility Training, Endurance Training, Patient/Caregiver Education & Training, Equipment Evaluation, Education, & procurement, Self-Care / ADL, Home Management Training. See long-term goal time frame for expected duration of plan of care. If no long-term goals established, a short length of stay is anticipated. Goals:     Short term goals  Time Frame for Short term goals: by discharge  Short term goal 1: Pt will increase activity tolerance for functional mobility household distances with MI and Sp02 >=90% in prep for ADL completion. Short term goal 2: Pt will complete BADL/light IADL tasks with MI, incorporating ECT prn, to increase independence and ease with self care tasks. Following session, patient left in safe position with all fall risk precautions in place.

## 2021-11-29 NOTE — PROGRESS NOTES
Physician Progress Note      Saskia Collado  CSN #:                  [id number]  :                       1980  ADMIT DATE:       2021 2:47 PM  [address] [place] DATE:  RESPONDING  PROVIDER #:        Donta EM          QUERY TEXTPiDr Raj Brantley,    Pt admitted with COVID-19 PNA and noted to have Fever 101.4, CRP 9.89,   procalcitonin 0.36. If possible, please document in progress notes and   discharge summary if you are evaluating and/or treating: The medical record reflects the following:  Risk Factors: discharged one day prior with COVID. Clinical Indicators: COVID PNA, temp 101.4, respirations 40, procalcitonin   0.36, CRP 9.89. Treatment: 0.9 NS 1914 ml bolus, IV Zosyn & Vanc. Options provided:  -- Sepsis present on admission due to COVID-19 pneumonia  -- Covid-19 pneumonia without sepsis  -- Other - I will add my own diagnosis  -- Disagree - Not applicable / Not valid  -- Disagree - Clinically unable to determine / Unknown  -- Refer to Clinical Documentation Reviewer    PROVIDER RESPONSE TEXT:    Provider is clinically unable to determine a response to this query.     Query created by: Demian Maravilla on 2021 7:02 AM      Electronically signed by:  Donta EM 2021 11:41 AM

## 2021-11-30 LAB
ANION GAP SERPL CALCULATED.3IONS-SCNC: 12 MEQ/L (ref 8–16)
BASOPHILS # BLD: 0.4 %
BASOPHILS ABSOLUTE: 0 THOU/MM3 (ref 0–0.1)
BUN BLDV-MCNC: 21 MG/DL (ref 7–22)
C-REACTIVE PROTEIN: 1.68 MG/DL (ref 0–1)
CALCIUM SERPL-MCNC: 8.1 MG/DL (ref 8.5–10.5)
CHLORIDE BLD-SCNC: 103 MEQ/L (ref 98–111)
CO2: 21 MEQ/L (ref 23–33)
CREAT SERPL-MCNC: 1 MG/DL (ref 0.4–1.2)
EOSINOPHIL # BLD: 1.5 %
EOSINOPHILS ABSOLUTE: 0.1 THOU/MM3 (ref 0–0.4)
ERYTHROCYTE [DISTWIDTH] IN BLOOD BY AUTOMATED COUNT: 12.6 % (ref 11.5–14.5)
ERYTHROCYTE [DISTWIDTH] IN BLOOD BY AUTOMATED COUNT: 39.7 FL (ref 35–45)
FERRITIN: 1750 NG/ML (ref 22–322)
GFR SERPL CREATININE-BSD FRML MDRD: 83 ML/MIN/1.73M2
GLUCOSE BLD-MCNC: 100 MG/DL (ref 70–108)
HCT VFR BLD CALC: 42.1 % (ref 42–52)
HEMOGLOBIN: 14.5 GM/DL (ref 14–18)
IMMATURE GRANS (ABS): 0.15 THOU/MM3 (ref 0–0.07)
IMMATURE GRANULOCYTES: 1.9 %
LD: 430 U/L (ref 100–190)
LYMPHOCYTES # BLD: 10.7 %
LYMPHOCYTES ABSOLUTE: 0.8 THOU/MM3 (ref 1–4.8)
MCH RBC QN AUTO: 30.1 PG (ref 26–33)
MCHC RBC AUTO-ENTMCNC: 34.4 GM/DL (ref 32.2–35.5)
MCV RBC AUTO: 87.3 FL (ref 80–94)
MONOCYTES # BLD: 7.4 %
MONOCYTES ABSOLUTE: 0.6 THOU/MM3 (ref 0.4–1.3)
NUCLEATED RED BLOOD CELLS: 0 /100 WBC
PLATELET # BLD: 229 THOU/MM3 (ref 130–400)
PMV BLD AUTO: 10.8 FL (ref 9.4–12.4)
POTASSIUM SERPL-SCNC: 3.9 MEQ/L (ref 3.5–5.2)
RBC # BLD: 4.82 MILL/MM3 (ref 4.7–6.1)
SEG NEUTROPHILS: 78.1 %
SEGMENTED NEUTROPHILS ABSOLUTE COUNT: 6.1 THOU/MM3 (ref 1.8–7.7)
SODIUM BLD-SCNC: 136 MEQ/L (ref 135–145)
WBC # BLD: 7.8 THOU/MM3 (ref 4.8–10.8)

## 2021-11-30 PROCEDURE — 97535 SELF CARE MNGMENT TRAINING: CPT

## 2021-11-30 PROCEDURE — 6370000000 HC RX 637 (ALT 250 FOR IP): Performed by: HOSPITALIST

## 2021-11-30 PROCEDURE — 2140000000 HC CCU INTERMEDIATE R&B

## 2021-11-30 PROCEDURE — 97110 THERAPEUTIC EXERCISES: CPT

## 2021-11-30 PROCEDURE — 83615 LACTATE (LD) (LDH) ENZYME: CPT

## 2021-11-30 PROCEDURE — 97530 THERAPEUTIC ACTIVITIES: CPT

## 2021-11-30 PROCEDURE — 80048 BASIC METABOLIC PNL TOTAL CA: CPT

## 2021-11-30 PROCEDURE — 99232 SBSQ HOSP IP/OBS MODERATE 35: CPT | Performed by: HOSPITALIST

## 2021-11-30 PROCEDURE — 36415 COLL VENOUS BLD VENIPUNCTURE: CPT

## 2021-11-30 PROCEDURE — 6360000002 HC RX W HCPCS: Performed by: HOSPITALIST

## 2021-11-30 PROCEDURE — 82728 ASSAY OF FERRITIN: CPT

## 2021-11-30 PROCEDURE — 6370000000 HC RX 637 (ALT 250 FOR IP): Performed by: INTERNAL MEDICINE

## 2021-11-30 PROCEDURE — 2580000003 HC RX 258: Performed by: INTERNAL MEDICINE

## 2021-11-30 PROCEDURE — 94760 N-INVAS EAR/PLS OXIMETRY 1: CPT

## 2021-11-30 PROCEDURE — 85025 COMPLETE CBC W/AUTO DIFF WBC: CPT

## 2021-11-30 PROCEDURE — 86140 C-REACTIVE PROTEIN: CPT

## 2021-11-30 PROCEDURE — 6370000000 HC RX 637 (ALT 250 FOR IP): Performed by: PHYSICIAN ASSISTANT

## 2021-11-30 RX ADMIN — ENOXAPARIN SODIUM 30 MG: 100 INJECTION SUBCUTANEOUS at 21:57

## 2021-11-30 RX ADMIN — LEVOTHYROXINE SODIUM 75 MCG: 0.07 TABLET ORAL at 04:45

## 2021-11-30 RX ADMIN — SODIUM CHLORIDE, PRESERVATIVE FREE 10 ML: 5 INJECTION INTRAVENOUS at 08:25

## 2021-11-30 RX ADMIN — BARICITINIB 4 MG: 2 TABLET, FILM COATED ORAL at 04:41

## 2021-11-30 RX ADMIN — ENOXAPARIN SODIUM 30 MG: 100 INJECTION SUBCUTANEOUS at 08:24

## 2021-11-30 RX ADMIN — SODIUM CHLORIDE, PRESERVATIVE FREE 10 ML: 5 INJECTION INTRAVENOUS at 21:57

## 2021-11-30 RX ADMIN — LORAZEPAM 1 MG: 1 TABLET ORAL at 22:10

## 2021-11-30 RX ADMIN — Medication 2000 UNITS: at 08:22

## 2021-11-30 RX ADMIN — OXYCODONE HYDROCHLORIDE AND ACETAMINOPHEN 500 MG: 500 TABLET ORAL at 08:22

## 2021-11-30 RX ADMIN — DEXAMETHASONE 6 MG: 1.5 TABLET ORAL at 14:12

## 2021-11-30 RX ADMIN — ASPIRIN 81 MG CHEWABLE TABLET 81 MG: 81 TABLET CHEWABLE at 08:24

## 2021-11-30 RX ADMIN — ACETAMINOPHEN 650 MG: 325 TABLET ORAL at 04:42

## 2021-11-30 RX ADMIN — PANTOPRAZOLE SODIUM 40 MG: 40 TABLET, DELAYED RELEASE ORAL at 04:45

## 2021-11-30 RX ADMIN — Medication 50 MG: at 08:22

## 2021-11-30 ASSESSMENT — PAIN DESCRIPTION - DESCRIPTORS: DESCRIPTORS: SHARP;SHOOTING

## 2021-11-30 ASSESSMENT — PAIN SCALES - GENERAL
PAINLEVEL_OUTOF10: 0
PAINLEVEL_OUTOF10: 3

## 2021-11-30 ASSESSMENT — PAIN DESCRIPTION - ORIENTATION: ORIENTATION: RIGHT;LEFT

## 2021-11-30 ASSESSMENT — PAIN DESCRIPTION - LOCATION: LOCATION: LEG;SHOULDER

## 2021-11-30 NOTE — PROGRESS NOTES
Pt removed from [facility] isolation per [facility] guidelines as approved by Dr. Gissell Nicole.

## 2021-11-30 NOTE — PROGRESS NOTES
[address] [address]  INPATIENT PHYSICAL THERAPY  DAILY NOTE  [facility] CCU-STEPDOWN 3B - 3B-21/021-A    Time In: 0730  Time Out: 4143  Timed Code Treatment Minutes: 24 Minutes  Minutes: 24          Date: 2021  Patient Name: Ophelia Guerrero,  Gender:  male        MRN: [id number]  : 1980  (46 y.o.)     Referring Practitioner: Dr. Harpreet Avalos  Diagnosis: acute respiratory failure with hypoxia  Additional Pertinent Hx: admit with above diagnosis, COVID 19 pneumonia, bradycardia, hypothyroidism, anxiety     Prior Level of Function:  Lives With: Spouse (3 kids, ages 24, 25, 15)  Type of Home: House  Home Layout: Two level, Able to Live on Main level with bedroom/bathroom  Home Access: Stairs to enter without rails  Entrance Stairs - Number of Steps: 1+1  Home Equipment: Oxygen (was discharged home with 1L O2 at rest, 4L with activity)   Bathroom Equipment: Shower chair    ADL Assistance: [address] [place] [address] Avenue: [address] [place] [address] Place: Independent  Transfer Assistance: Independent  Active : Yes  Additional Comments: Pt reported wife is OT at [facility], would have access to any equipment needed. Pt reported will have close to  supervision arranged between family members at discharge.     Restrictions/Precautions:  Restrictions/Precautions: General Precautions  Position Activity Restriction  Other position/activity restrictions: HFNC     SUBJECTIVE: pleasant and cooperative, motivated    PAIN: no c/o pain initially, c/o muscle cramps BLE and shoulders    Vitals: Oxygen: HFNC 60L at 53% O2 sat 90%, with mobility pt inc to 60% FiO2 to maintain 88-90%, at end of session pt returned to 53%, RN aware    OBJECTIVE:  Bed Mobility:  Rolling to Left: Modified Independent   Rolling to Right: Modified Independent   Supine to Sit: Modified Independent  Sit to Supine: Modified Independent   Scooting: Modified Independent    Transfers:  Sit to Stand: Supervision  Stand to Sit:Supervision    Ambulation:  Stand By Assistance  Distance: 3'x1  Surface: Level Tile  Device:No Device  Gait Deviations:  None    Balance:  Static Sitting Balance:  Independent  Static Standing Balance: Stand By Assistance    Exercise:  PROM heelcord/hamstring stretches BLE hold 20 sec x3 reps, pec stretch hold 15 sec x3 reps, pt report felt better after stretching by PT    Functional Outcome Measures: Completed  AM-PAC Inpatient Mobility without Stair Climbing Raw Score : 17  AM-PAC Inpatient without Stair Climbing T-Scale Score : 48.47    ASSESSMENT:  Assessment: Patient progressing toward established goals. Activity Tolerance:  Patient tolerance of  treatment: fair. Equipment Recommendations:Equipment Needed: No  Discharge Recommendations: Continue to assess pending progress and anticipate return home at discharge  Plan: Times per week: 5X C  Times per day: Daily  Specific instructions for Next Treatment: therex and mobility    Patient Education  Patient Education: breathing technique, monitor O2 sats    Goals:  Patient goals : return home  Short term goals  Time Frame for Short term goals: by discharge  Short term goal 1: bed mobility with MOD I to get in/out of bed  Short term goal 2: transfer with MOD I to get in/out of chairs  Short term goal 3: amb >150'x1 without AD and MOD I, maintaining O2 sats >90% on </=4L O2, for safe home mobility  Long term goals  Time Frame for Long term goals : no LTGs set secondary to short ELOS    Following session, patient left in safe position with all fall risk precautions in place.

## 2021-11-30 NOTE — PROGRESS NOTES
[address] [place] [address]  Occupational Therapy  Daily Note  Time:   Time In: 09  Time Out: 1004  Timed Code Treatment Minutes: 24 Minutes  Minutes: 24          Date: 2021  Patient Name: Lesly Romo,   Gender: male      Room: -21/021-A  MRN: [id number]  : 1980  (46 y.o.)  Referring Practitioner: Juni James MD  Diagnosis: Acute hypoxemic respiratory failure due to COVID-19  Additional Pertinent Hx: Per H&P:46year-old male patient recent diagnosed with Covid pneumonia and acute hypoxic respiratory failure. Being readmitted today because of worsening shortness of breath and hypoxemia at home. Patient was discharged home on home oxygen nasal cannula 1 to 4 L. Restrictions/Precautions:  Restrictions/Precautions: General Precautions  Position Activity Restriction  Other position/activity restrictions: HFNC ** Out of isolation       SUBJECTIVE: Pt laying in bed upon arrival, pt agreeable to OT session, RN gave verbal permission for session     PAIN: 0/10:     Vitals: Oxygen: 60L Fi02 56, with pt able to maintain 89-90 sitting at the EOB, and then patient dropping to 84-86% with activity with pt able to rebound to 89-90 within 1 minute with deep breathing    COGNITION: WFL    ADL:   Grooming: Stand By Assistance. with pt standing at the sink to brush teeth. BALANCE:  Standing Balance: Stand By Assistance. with not using any AE, with pt standing for 4 minutes with 1 rest break and vc's for PLB technique when brushing teeth    BED MOBILITY:  Supine to Sit: Supervision . TRANSFERS:  Sit to Stand:  Supervision. from EOB  Stand to Sit: Supervision. back to bed    FUNCTIONAL MOBILITY:  Assistive Device: None  Assist Level:  Supervision. Distance: To and from bathroom       ADDITIONAL ACTIVITIES:  Pt educated on exercises for chest wall expansion with pt able to complete 5 repetitions with good understanding.  Pt able to increase oxygen levels with completion with pt

## 2021-11-30 NOTE — PROGRESS NOTES
Hospitalist Progress Note    Patient:  Pantera Rosenbaum      Unit/Bed:3B-21/021-A    YOB: 1980    MRN: [id number]       Acct: [de-identified]     PCP: Lalit Delgado MD    Date of Admission: 11/25/2021      Subjective: Patient seen and examined. Patient notes that when he is getting the steroid IV it tends to burn. Patient states he has had a difficult time being able to sleep. Patient attributes it to the steroids at night. No fever, no chills, no nausea, no vomiting. Medications:  Reviewed    Infusion Medications    sodium chloride 75 mL/hr at 11/28/21 1317    sodium chloride 25 mL (11/26/21 2053)     Scheduled Medications    [START ON 11/30/2021] dexamethasone  6 mg Oral Daily    enoxaparin  30 mg SubCUTAneous Q12H    HYDROcodone-acetaminophen  1.5 tablet Oral Once    baricitinib  4 mg Oral Daily    levothyroxine  75 mcg Oral Daily    ascorbic acid  500 mg Oral Daily    aspirin  81 mg Oral Daily    pantoprazole  40 mg Oral QAM AC    zinc sulfate  50 mg Oral Daily    sodium chloride flush  5-40 mL IntraVENous 2 times per day    Vitamin D  2,000 Units Oral Daily     PRN Meds: melatonin, LORazepam, sodium chloride flush, sodium chloride, ondansetron **OR** ondansetron, polyethylene glycol, acetaminophen **OR** acetaminophen      Intake/Output Summary (Last 24 hours) at 11/29/2021 1951  Last data filed at 11/29/2021 1843  Gross per 24 hour   Intake 990 ml   Output 2050 ml   Net -1060 ml       Diet:  ADULT DIET; Regular    Exam:  BP (!) 93/50   Pulse 72   Temp 98.2 °F (36.8 °C) (Oral)   Resp 20   Ht 5' 6\" (1.676 m)   Wt 185 lb (83.9 kg)   SpO2 95%   BMI 29.86 kg/m²   Physical Exam  Constitutional:       Appearance: Normal appearance. HENT:      Head: Normocephalic and atraumatic. Right Ear: External ear normal.      Left Ear: External ear normal.      Nose: Nose normal.      Mouth/Throat:      Pharynx: Oropharynx is clear.    Eyes:      Extraocular Movements: Extraocular movements intact. Pupils: Pupils are equal, round, and reactive to light. Cardiovascular:      Rate and Rhythm: Normal rate and regular rhythm. Pulses: Normal pulses. Heart sounds: Normal heart sounds. No murmur heard. No gallop. Pulmonary:      Effort: Pulmonary effort is normal. No respiratory distress. Breath sounds: Normal breath sounds. No wheezing, rhonchi or rales. Comments: High flow oxygen in place  Abdominal:      General: Bowel sounds are normal. There is no distension. Tenderness: There is no abdominal tenderness. Musculoskeletal:         General: No swelling. Normal range of motion. Cervical back: Normal range of motion and neck supple. Skin:     General: Skin is warm. Neurological:      General: No focal deficit present. Mental Status: He is alert. Labs:   Recent Labs     11/27/21 0349 11/28/21 0407   WBC 9.2 8.6   HGB 14.6 14.9   HCT 44.0 44.3    209     Recent Labs     11/27/21 0349 11/28/21 0407    138   K 5.2 4.7    105   CO2 23 23   BUN 17 16   CREATININE 1.1 1.0   CALCIUM 8.3* 8.3*     No results for input(s): AST, ALT, BILIDIR, BILITOT, ALKPHOS in the last 72 hours. No results for input(s): INR in the last 72 hours. No results for input(s): Estelle Gazella in the last 72 hours. Urinalysis:      Lab Results   Component Value Date    NITRU NEGATIVE 11/25/2021    WBCUA 0-2 11/25/2021    BACTERIA NONE SEEN 11/25/2021    RBCUA 3-5 11/25/2021    BLOODU NEGATIVE 11/25/2021    GLUCOSEU NEGATIVE 11/25/2021       Radiology:  CTA CHEST W WO CONTRAST - r/o Pulmonary Embolism   Final Result      1. Extensive bilateral pneumonia. Worse appearance of the chest when compared to the previous CT scan. 2. No pulmonary moles. **This report has been created using voice recognition software. It may contain minor errors which are inherent in voice recognition technology. **      Final report electronically signed by Dr Naa Hatfield on 11/25/2021 7:09 PM      XR CHEST PORTABLE   Final Result   1. Diffuse airspace opacities seen bilaterally relating to underlying infectious etiology. **This report has been created using voice recognition software. It may contain minor errors which are inherent in voice recognition technology. **      Final report electronically signed by Dr Romelia Shipman on 11/25/2021 3:44 PM          Diet: ADULT DIET; Regular    DVT prophylaxis: [x] Lovenox                                 [] SCDs                                 [] SQ Heparin                                 [] Encourage ambulation           [] Already on Anticoagulation     Disposition:    [x] Home       [] TCU       [] Rehab       [] Psych       [] SNF       [] [facility]       [] Other-    Code Status: Full Code    PT/OT Eval Status: On board    Assessment/Plan:    Anticipated Discharge in : To be determined    Active Hospital Problems    Diagnosis Date Noted    Pneumonia due to severe acute respiratory syndrome coronavirus 2 (SARS-CoV-2) [U07.1, J12.82] 11/25/2021     Priority: High    Acute hypoxemic respiratory failure due to COVID-19 (HCC) [U07.1, J96.01] 11/20/2021     Priority: High    Bradycardia [R00.1] 11/23/2021     Priority: Medium    Acquired hypothyroidism [E03.9] 11/21/2021     Priority: Low       1. Acute respiratory failure with hypoxia-patient noted to have an oxygen saturation of 75% on room air. Patient initially placed on a nonrebreather and later transferred to high flow oxygen. Patient's oxygen will be weaned down as able. · 11/27/2021-patient continues to be on high flow oxygen. · 11/28/2021-patient continues to be on high flow oxygen. Oxygen has been weaned down. Patient is feeling better breathing. · 11/29/2021-patient on high flow oxygen, currently being titrated down. 2. Pneumonia secondary to COVID-19-patient first tested for Covid on 11/9/2021 with a home kit.   When patient was admitted on previous admission, patient tested negative for COVID-19. Respiratory panel that was sent does show that patient is positive for COVID-19. Patient treated with dexamethasone and antibiotics in previous admission. Patient will be continued on dexamethasone and baricitinib. Patient also be given vitamin C, vitamin D and zinc.  Patient given a dose of antibiotics in the ED. We will hold off on giving any further antibiotics as patient symptoms are most like secondary to COVID-19.  · 11/27/2021-patient CRP noted to trend up from 9.89-14.39. LDH noted to trend up from 625-777. Ferritin noted to trend from 2375-7153. · 11/28/2021-patient's inflammatory markers noted to be trending down. 3. Bradycardia-patient has had a some vitals that were taken that showed bradycardia. Patient had similar findings on previous admission. Patient's metoprolol stopped on previous admission. Patient will be continued to be monitored on telemetry. 4. Acquired hypothyroidism-patient with history of hypothyroidism. Patient will be continued on his home dose of Synthroid at 75 mcg. 5. Anxiety-patient has been having issues with anxiety during hospitalization. Patient was started on Ativan on previous admission. Patient will be continued on Ativan during hospitalization. Patient already has a prescription for Ativan on prior admission. Patient will need to follow-up with his primary doctor for further treatment for his anxiety. 6. Insomnia-patient notes that he is having a difficult time being able to sleep. Patient's Decadron changed to p.o. in the morning. Patient's melatonin increased to 10 mg at night as needed.         Electronically signed by Charito Lopez MD on 11/29/2021 at 7:51 PM

## 2021-12-01 LAB
ANION GAP SERPL CALCULATED.3IONS-SCNC: 8 MEQ/L (ref 8–16)
BASOPHILS # BLD: 0.1 %
BASOPHILS ABSOLUTE: 0 THOU/MM3 (ref 0–0.1)
BLOOD CULTURE, ROUTINE: NORMAL
BLOOD CULTURE, ROUTINE: NORMAL
BUN BLDV-MCNC: 16 MG/DL (ref 7–22)
C-REACTIVE PROTEIN: 2.01 MG/DL (ref 0–1)
CALCIUM SERPL-MCNC: 8.4 MG/DL (ref 8.5–10.5)
CHLORIDE BLD-SCNC: 104 MEQ/L (ref 98–111)
CO2: 23 MEQ/L (ref 23–33)
CREAT SERPL-MCNC: 1 MG/DL (ref 0.4–1.2)
EOSINOPHIL # BLD: 0 %
EOSINOPHILS ABSOLUTE: 0 THOU/MM3 (ref 0–0.4)
ERYTHROCYTE [DISTWIDTH] IN BLOOD BY AUTOMATED COUNT: 12.7 % (ref 11.5–14.5)
ERYTHROCYTE [DISTWIDTH] IN BLOOD BY AUTOMATED COUNT: 40.8 FL (ref 35–45)
FERRITIN: 1587 NG/ML (ref 22–322)
GFR SERPL CREATININE-BSD FRML MDRD: 83 ML/MIN/1.73M2
GLUCOSE BLD-MCNC: 127 MG/DL (ref 70–108)
HCT VFR BLD CALC: 44.7 % (ref 42–52)
HEMOGLOBIN: 15.2 GM/DL (ref 14–18)
IMMATURE GRANS (ABS): 0.13 THOU/MM3 (ref 0–0.07)
IMMATURE GRANULOCYTES: 1.6 %
LD: 411 U/L (ref 100–190)
LYMPHOCYTES # BLD: 6.3 %
LYMPHOCYTES ABSOLUTE: 0.5 THOU/MM3 (ref 1–4.8)
MCH RBC QN AUTO: 30 PG (ref 26–33)
MCHC RBC AUTO-ENTMCNC: 34 GM/DL (ref 32.2–35.5)
MCV RBC AUTO: 88.3 FL (ref 80–94)
MONOCYTES # BLD: 5 %
MONOCYTES ABSOLUTE: 0.4 THOU/MM3 (ref 0.4–1.3)
NUCLEATED RED BLOOD CELLS: 0 /100 WBC
PLATELET # BLD: 248 THOU/MM3 (ref 130–400)
PMV BLD AUTO: 10.4 FL (ref 9.4–12.4)
POTASSIUM SERPL-SCNC: 5.1 MEQ/L (ref 3.5–5.2)
RBC # BLD: 5.06 MILL/MM3 (ref 4.7–6.1)
SEG NEUTROPHILS: 87 %
SEGMENTED NEUTROPHILS ABSOLUTE COUNT: 7 THOU/MM3 (ref 1.8–7.7)
SODIUM BLD-SCNC: 135 MEQ/L (ref 135–145)
WBC # BLD: 8.1 THOU/MM3 (ref 4.8–10.8)

## 2021-12-01 PROCEDURE — 85025 COMPLETE CBC W/AUTO DIFF WBC: CPT

## 2021-12-01 PROCEDURE — 6370000000 HC RX 637 (ALT 250 FOR IP): Performed by: HOSPITALIST

## 2021-12-01 PROCEDURE — 2580000003 HC RX 258: Performed by: INTERNAL MEDICINE

## 2021-12-01 PROCEDURE — 2700000000 HC OXYGEN THERAPY PER DAY

## 2021-12-01 PROCEDURE — 99232 SBSQ HOSP IP/OBS MODERATE 35: CPT | Performed by: INTERNAL MEDICINE

## 2021-12-01 PROCEDURE — 86140 C-REACTIVE PROTEIN: CPT

## 2021-12-01 PROCEDURE — 2140000000 HC CCU INTERMEDIATE R&B

## 2021-12-01 PROCEDURE — 83615 LACTATE (LD) (LDH) ENZYME: CPT

## 2021-12-01 PROCEDURE — 97530 THERAPEUTIC ACTIVITIES: CPT

## 2021-12-01 PROCEDURE — 97110 THERAPEUTIC EXERCISES: CPT

## 2021-12-01 PROCEDURE — 6370000000 HC RX 637 (ALT 250 FOR IP): Performed by: INTERNAL MEDICINE

## 2021-12-01 PROCEDURE — 36415 COLL VENOUS BLD VENIPUNCTURE: CPT

## 2021-12-01 PROCEDURE — 6360000002 HC RX W HCPCS: Performed by: HOSPITALIST

## 2021-12-01 PROCEDURE — 94760 N-INVAS EAR/PLS OXIMETRY 1: CPT

## 2021-12-01 PROCEDURE — 82728 ASSAY OF FERRITIN: CPT

## 2021-12-01 PROCEDURE — 6370000000 HC RX 637 (ALT 250 FOR IP): Performed by: PHYSICIAN ASSISTANT

## 2021-12-01 PROCEDURE — 80048 BASIC METABOLIC PNL TOTAL CA: CPT

## 2021-12-01 RX ADMIN — ASPIRIN 81 MG CHEWABLE TABLET 81 MG: 81 TABLET CHEWABLE at 08:07

## 2021-12-01 RX ADMIN — BARICITINIB 4 MG: 2 TABLET, FILM COATED ORAL at 08:07

## 2021-12-01 RX ADMIN — ENOXAPARIN SODIUM 30 MG: 100 INJECTION SUBCUTANEOUS at 08:07

## 2021-12-01 RX ADMIN — OXYCODONE HYDROCHLORIDE AND ACETAMINOPHEN 500 MG: 500 TABLET ORAL at 08:06

## 2021-12-01 RX ADMIN — SODIUM CHLORIDE, PRESERVATIVE FREE 10 ML: 5 INJECTION INTRAVENOUS at 08:08

## 2021-12-01 RX ADMIN — LORAZEPAM 1 MG: 1 TABLET ORAL at 21:24

## 2021-12-01 RX ADMIN — ENOXAPARIN SODIUM 30 MG: 100 INJECTION SUBCUTANEOUS at 21:20

## 2021-12-01 RX ADMIN — Medication 50 MG: at 08:07

## 2021-12-01 RX ADMIN — PANTOPRAZOLE SODIUM 40 MG: 40 TABLET, DELAYED RELEASE ORAL at 08:07

## 2021-12-01 RX ADMIN — Medication 2000 UNITS: at 08:07

## 2021-12-01 RX ADMIN — DEXAMETHASONE 6 MG: 1.5 TABLET ORAL at 08:06

## 2021-12-01 RX ADMIN — LEVOTHYROXINE SODIUM 75 MCG: 0.07 TABLET ORAL at 08:07

## 2021-12-01 NOTE — PROGRESS NOTES
Hospitalist Progress Note    Patient:  Criselda Bumpers      Unit/Bed:3B-21/021-A    YOB: 1980    MRN: [id number]       Acct: [de-identified]     PCP: General Riley MD    Date of Admission: 11/25/2021      Subjective: Patient seen and examined. Patient states he feels much better today. Patient came out of isolation and his wife was able to be at his bedside. Patient notes that he has been moving around in the room more and he states he feels \"fantastic\". Patient reports that he slept very well last night. Patient states he did not even need to use melatonin. Medications:  Reviewed    Infusion Medications    sodium chloride 75 mL/hr at 11/28/21 1317    sodium chloride 25 mL (11/26/21 2053)     Scheduled Medications    dexamethasone  6 mg Oral Daily    enoxaparin  30 mg SubCUTAneous Q12H    HYDROcodone-acetaminophen  1.5 tablet Oral Once    baricitinib  4 mg Oral Daily    levothyroxine  75 mcg Oral Daily    ascorbic acid  500 mg Oral Daily    aspirin  81 mg Oral Daily    pantoprazole  40 mg Oral QAM AC    zinc sulfate  50 mg Oral Daily    sodium chloride flush  5-40 mL IntraVENous 2 times per day    Vitamin D  2,000 Units Oral Daily     PRN Meds: melatonin, LORazepam, sodium chloride flush, sodium chloride, ondansetron **OR** ondansetron, polyethylene glycol, acetaminophen **OR** acetaminophen      Intake/Output Summary (Last 24 hours) at 12/1/2021 1559  Last data filed at 12/1/2021 0122  Gross per 24 hour   Intake 200 ml   Output --   Net 200 ml       Diet:  ADULT DIET; Regular    Exam:  BP (!) 101/56   Pulse 75   Temp 98 °F (36.7 °C) (Oral)   Resp 18   Ht 5' 6\" (1.676 m)   Wt 184 lb 6.4 oz (83.6 kg)   SpO2 95%   BMI 29.76 kg/m²   Physical Exam  Constitutional:       Appearance: Normal appearance. HENT:      Head: Normocephalic and atraumatic.       Right Ear: External ear normal.      Left Ear: External ear normal.      Nose: Nose normal.      Mouth/Throat:      Pharynx: Oropharynx is clear. Eyes:      Extraocular Movements: Extraocular movements intact. Pupils: Pupils are equal, round, and reactive to light. Cardiovascular:      Rate and Rhythm: Normal rate and regular rhythm. Pulses: Normal pulses. Heart sounds: Normal heart sounds. No murmur heard. No gallop. Pulmonary:      Effort: Pulmonary effort is normal. No respiratory distress. Breath sounds: Normal breath sounds. No wheezing, rhonchi or rales. Comments: High flow oxygen in place  Abdominal:      General: Bowel sounds are normal. There is no distension. Tenderness: There is no abdominal tenderness. Musculoskeletal:         General: No swelling. Normal range of motion. Cervical back: Normal range of motion and neck supple. Skin:     General: Skin is warm. Neurological:      General: No focal deficit present. Mental Status: He is alert. Labs:   Recent Labs     11/30/21 0325 12/01/21  0414   WBC 7.8 8.1   HGB 14.5 15.2   HCT 42.1 44.7    248     Recent Labs     11/30/21 0325 12/01/21 0414    135   K 3.9 5.1    104   CO2 21* 23   BUN 21 16   CREATININE 1.0 1.0   CALCIUM 8.1* 8.4*     No results for input(s): AST, ALT, BILIDIR, BILITOT, ALKPHOS in the last 72 hours. No results for input(s): INR in the last 72 hours. No results for input(s): Adonay Shackle in the last 72 hours. Urinalysis:      Lab Results   Component Value Date    NITRU NEGATIVE 11/25/2021    WBCUA 0-2 11/25/2021    BACTERIA NONE SEEN 11/25/2021    RBCUA 3-5 11/25/2021    BLOODU NEGATIVE 11/25/2021    GLUCOSEU NEGATIVE 11/25/2021       Radiology:  CTA CHEST W WO CONTRAST - r/o Pulmonary Embolism   Final Result      1. Extensive bilateral pneumonia. Worse appearance of the chest when compared to the previous CT scan. 2. No pulmonary moles. **This report has been created using voice recognition software.  It may contain minor errors which are inherent in voice recognition technology. **      Final report electronically signed by Dr Christina Sales on 11/25/2021 7:09 PM      XR CHEST PORTABLE   Final Result   1. Diffuse airspace opacities seen bilaterally relating to underlying infectious etiology. **This report has been created using voice recognition software. It may contain minor errors which are inherent in voice recognition technology. **      Final report electronically signed by Dr Amador Cameron on 11/25/2021 3:44 PM          Diet: ADULT DIET; Regular    DVT prophylaxis: [x] Lovenox                                 [] SCDs                                 [] SQ Heparin                                 [] Encourage ambulation           [] Already on Anticoagulation     Disposition:    [x] Home       [] TCU       [] Rehab       [] Psych       [] SNF       [] [facility]       [] Other-    Code Status: Full Code    PT/OT Eval Status: On board    Assessment/Plan:    Anticipated Discharge in : To be determined    Active Hospital Problems    Diagnosis Date Noted    Pneumonia due to severe acute respiratory syndrome coronavirus 2 (SARS-CoV-2) [U07.1, J12.82] 11/25/2021    Bradycardia [R00.1] 11/23/2021    Acquired hypothyroidism [E03.9] 11/21/2021    Acute hypoxemic respiratory failure due to COVID-19 ([facility] [address] [U07.1, J96.01] 11/20/2021       1. Acute respiratory failure with hypoxia-patient noted to have an oxygen saturation of 75% on room air. Patient initially placed on a nonrebreather and later transferred to high flow oxygen. Patient's oxygen will be weaned down as able. · 11/27/2021-patient continues to be on high flow oxygen. · 11/28/2021-patient continues to be on high flow oxygen. Oxygen has been weaned down. Patient is feeling better breathing. · 11/29/2021-patient on high flow oxygen, currently being titrated down. · 11/30/2021-patient continues on high flow oxygen that is being titrated down  2.  Pneumonia secondary to COVID-19-patient first tested for Covid on 11/9/2021 with a home kit. When patient was admitted on previous admission, patient tested negative for COVID-19. Respiratory panel that was sent does show that patient is positive for COVID-19. Patient treated with dexamethasone and antibiotics in previous admission. Patient will be continued on dexamethasone and baricitinib. Patient also be given vitamin C, vitamin D and zinc.  Patient given a dose of antibiotics in the ED. We will hold off on giving any further antibiotics as patient symptoms are most like secondary to COVID-19.  · 11/27/2021-patient CRP noted to trend up from 9.89-14.39. LDH noted to trend up from 625-777. Ferritin noted to trend from 6042-3134. · 11/28/2021-patient's inflammatory markers noted to be trending down. · 11/30/2021-patient's inflammatory markers are all trending down  3. Bradycardia-patient has had a some vitals that were taken that showed bradycardia. Patient had similar findings on previous admission. Patient's metoprolol stopped on previous admission. Patient will be continued to be monitored on telemetry. 4. Acquired hypothyroidism-patient with history of hypothyroidism. Patient will be continued on his home dose of Synthroid at 75 mcg. 5. Anxiety-patient has been having issues with anxiety during hospitalization. Patient was started on Ativan on previous admission. Patient will be continued on Ativan during hospitalization. Patient already has a prescription for Ativan on prior admission. Patient will need to follow-up with his primary doctor for further treatment for his anxiety. 6. Insomnia-patient notes that he is having a difficult time being able to sleep. Patient's Decadron changed to p.o. in the morning. Patient's melatonin increased to 10 mg at night as needed.         Electronically signed by Ira Carpenter MD on 12/1/2021 at 3:59 PM

## 2021-12-01 NOTE — PROGRESS NOTES
[address] [place] [address]  Occupational Therapy  Daily Note  Time:   Time In: 1300  Time Out: 1340  Timed Code Treatment Minutes: 40 Minutes  Minutes: 40          Date: 2021  Patient Name: Cristian Jung,   Gender: male      Room: [facility]21/021-A  MRN: [id number]  : 1980  (46 y.o.)  Referring Practitioner: Melissa Alejo MD  Diagnosis: Acute hypoxemic respiratory failure due to COVID-19  Additional Pertinent Hx: Per H&P:46year-old male patient recent diagnosed with Covid pneumonia and acute hypoxic respiratory failure. Being readmitted today because of worsening shortness of breath and hypoxemia at home. Patient was discharged home on home oxygen nasal cannula 1 to 4 L. Restrictions/Precautions:  Restrictions/Precautions: General Precautions  Position Activity Restriction  Other position/activity restrictions: HFNC 55% 40L    SUBJECTIVE: Pt sitting in chair upon arrival, pt agreeable to OT session, RN gave verbal approval for session, pt's wife present during session    PAIN: 0/10:     Vitals: Oxygen: pt able to maintain 92-93% while sitting, and destated to 89% with activity 1x  Heart Rate:  depending on activity    COGNITION: WFL    ADL:   No ADL's completed this session. Stefano Marrero BALANCE:  Standing Balance: Supervision. with pt not using AE, with pt able to stand statically for 6-7 minute increments this date, and then dynamically with no LOB, and pt maintaining o2 levels focusing on completing therapeutic exericses while completing chest wall expansion exercises with pt maintaining good o2 levels. BED MOBILITY:  Not Tested    TRANSFERS:  Sit to Stand:  Supervision. from recliner  Stand to Sit: Supervision. back to recliner    FUNCTIONAL MOBILITY:  Assistive Device: None  Assist Level:  Supervision.    Distance: short distances in room due to HiFlo tubing with pt demoing good endurance       ADDITIONAL ACTIVITIES:  Reviewed breathing exercises with pt, with pt able to complete standing this date with completing 10 repetitions, focusing on inhalation/exhalation with arm movements with pt noted to have increase in o2 stats following. Pt educated on EC/WS, with taking extended rest breaks following exercises with good understanding. Pt able to stand and complete marches, shoulder exercises- focusing on decreasing tightness with trapezius and UE from being supine in bed with completing 10 repetitions with wife educated on exercises, and crossing midline with BUE with no LOB. ASSESSMENT:     Activity Tolerance:  Patient tolerance of  treatment: good. Discharge Recommendations: Home Independently  Equipment Recommendations: Equipment Needed: No  Plan: Times per week: 5x  Current Treatment Recommendations: Strengthening, Functional Mobility Training, Endurance Training, Patient/Caregiver Education & Training, Equipment Evaluation, Education, & procurement, Self-Care / ADL, Home Management Training    Patient Education  Patient Education: Energy Conservation    Goals  Short term goals  Time Frame for Short term goals: by discharge  Short term goal 1: Pt will increase activity tolerance for functional mobility household distances with MI and Sp02 >=90% in prep for ADL completion. Short term goal 2: Pt will complete BADL/light IADL tasks with MI, incorporating ECT prn, to increase independence and ease with self care tasks. Following session, patient left in safe position with all fall risk precautions in place.

## 2021-12-01 NOTE — FLOWSHEET NOTE
Pt was with his wife as he was going through covid. He was not giving up. He was anointed. 12/01/21 1650   Encounter Summary   Services provided to: Patient   Referral/Consult From: [facility] Spouse   Place of [employer]   Continue Visiting Yes  (12/1)   Complexity of Encounter Moderate   Length of Encounter 15 minutes   Spiritual/Alevism   Type Spiritual support   Assessment Approachable; Calm   Intervention Prayer; Nurtured hope; Active listening; Empowerment; Sustaining presence/ Ministry of presence   Outcome Connection/belonging; Expressed gratitude; Encouraged;  Hopeful; Receptive   Sacraments   Sacrament of Sick-Anointing Anointed

## 2021-12-01 NOTE — PROGRESS NOTES
Hospitalist Progress Note    Patient:  Jordyn Kendrick      Unit/Bed:3B-21/021-A    YOB: 1980    MRN: [id number]       Acct: [de-identified]     PCP: Suresh Gilmore MD    Date of Admission: 11/25/2021      Subjective: Patient seen and examined. Patient states he feels much better today. Patient came out of isolation and his wife was able to be at his bedside. Patient notes that he has been moving around in the room more and he states he feels \"fantastic\". Patient reports that he slept very well last night. Patient states he did not even need to use melatonin. Medications:  Reviewed    Infusion Medications    sodium chloride 75 mL/hr at 11/28/21 1317    sodium chloride 25 mL (11/26/21 2053)     Scheduled Medications    dexamethasone  6 mg Oral Daily    enoxaparin  30 mg SubCUTAneous Q12H    HYDROcodone-acetaminophen  1.5 tablet Oral Once    baricitinib  4 mg Oral Daily    levothyroxine  75 mcg Oral Daily    ascorbic acid  500 mg Oral Daily    aspirin  81 mg Oral Daily    pantoprazole  40 mg Oral QAM AC    zinc sulfate  50 mg Oral Daily    sodium chloride flush  5-40 mL IntraVENous 2 times per day    Vitamin D  2,000 Units Oral Daily     PRN Meds: melatonin, LORazepam, sodium chloride flush, sodium chloride, ondansetron **OR** ondansetron, polyethylene glycol, acetaminophen **OR** acetaminophen      Intake/Output Summary (Last 24 hours) at 11/30/2021 1929  Last data filed at 11/30/2021 1508  Gross per 24 hour   Intake 1020 ml   Output 525 ml   Net 495 ml       Diet:  ADULT DIET; Regular    Exam:  BP (!) 95/58   Pulse 77   Temp 98 °F (36.7 °C) (Oral)   Resp 24   Ht 5' 6\" (1.676 m)   Wt 185 lb (83.9 kg)   SpO2 93% Comment: spo2 was 93% after 20 min still  BMI 29.86 kg/m²   Physical Exam  Constitutional:       Appearance: Normal appearance. HENT:      Head: Normocephalic and atraumatic.       Right Ear: External ear normal.      Left Ear: External ear normal.      Nose: Nose normal.      Mouth/Throat:      Pharynx: Oropharynx is clear. Eyes:      Extraocular Movements: Extraocular movements intact. Pupils: Pupils are equal, round, and reactive to light. Cardiovascular:      Rate and Rhythm: Normal rate and regular rhythm. Pulses: Normal pulses. Heart sounds: Normal heart sounds. No murmur heard. No gallop. Pulmonary:      Effort: Pulmonary effort is normal. No respiratory distress. Breath sounds: Normal breath sounds. No wheezing, rhonchi or rales. Comments: High flow oxygen in place  Abdominal:      General: Bowel sounds are normal. There is no distension. Tenderness: There is no abdominal tenderness. Musculoskeletal:         General: No swelling. Normal range of motion. Cervical back: Normal range of motion and neck supple. Skin:     General: Skin is warm. Neurological:      General: No focal deficit present. Mental Status: He is alert. Labs:   Recent Labs     11/28/21 0407 11/30/21 0325   WBC 8.6 7.8   HGB 14.9 14.5   HCT 44.3 42.1    229     Recent Labs     11/28/21 0407 11/30/21  0325    136   K 4.7 3.9    103   CO2 23 21*   BUN 16 21   CREATININE 1.0 1.0   CALCIUM 8.3* 8.1*     No results for input(s): AST, ALT, BILIDIR, BILITOT, ALKPHOS in the last 72 hours. No results for input(s): INR in the last 72 hours. No results for input(s): [place] in the last 72 hours. Urinalysis:      Lab Results   Component Value Date    NITRU NEGATIVE 11/25/2021    WBCUA 0-2 11/25/2021    BACTERIA NONE SEEN 11/25/2021    RBCUA 3-5 11/25/2021    BLOODU NEGATIVE 11/25/2021    GLUCOSEU NEGATIVE 11/25/2021       Radiology:  CTA CHEST W WO CONTRAST - r/o Pulmonary Embolism   Final Result      1. Extensive bilateral pneumonia. Worse appearance of the chest when compared to the previous CT scan. 2. No pulmonary moles. **This report has been created using voice recognition software.  It may contain minor errors which are inherent in voice recognition technology. **      Final report electronically signed by Dr Elisa Leon on 11/25/2021 7:09 PM      XR CHEST PORTABLE   Final Result   1. Diffuse airspace opacities seen bilaterally relating to underlying infectious etiology. **This report has been created using voice recognition software. It may contain minor errors which are inherent in voice recognition technology. **      Final report electronically signed by Dr Basia Kumari on 11/25/2021 3:44 PM          Diet: ADULT DIET; Regular    DVT prophylaxis: [x] Lovenox                                 [] SCDs                                 [] SQ Heparin                                 [] Encourage ambulation           [] Already on Anticoagulation     Disposition:    [x] Home       [] TCU       [] Rehab       [] Psych       [] SNF       [] [facility]       [] Other-    Code Status: Full Code    PT/OT Eval Status: On board    Assessment/Plan:    Anticipated Discharge in : To be determined    Active Hospital Problems    Diagnosis Date Noted    Pneumonia due to severe acute respiratory syndrome coronavirus 2 (SARS-CoV-2) [U07.1, J12.82] 11/25/2021     Priority: High    Acute hypoxemic respiratory failure due to COVID-19 (HCC) [U07.1, J96.01] 11/20/2021     Priority: High    Bradycardia [R00.1] 11/23/2021     Priority: Medium    Acquired hypothyroidism [E03.9] 11/21/2021     Priority: Low       1. Acute respiratory failure with hypoxia-patient noted to have an oxygen saturation of 75% on room air. Patient initially placed on a nonrebreather and later transferred to high flow oxygen. Patient's oxygen will be weaned down as able. · 11/27/2021-patient continues to be on high flow oxygen. · 11/28/2021-patient continues to be on high flow oxygen. Oxygen has been weaned down. Patient is feeling better breathing.   · 11/29/2021-patient on high flow oxygen, currently being titrated down.  · 11/30/2021-patient continues on high flow oxygen that is being titrated down  2. Pneumonia secondary to COVID-19-patient first tested for Covid on 11/9/2021 with a home kit. When patient was admitted on previous admission, patient tested negative for COVID-19. Respiratory panel that was sent does show that patient is positive for COVID-19. Patient treated with dexamethasone and antibiotics in previous admission. Patient will be continued on dexamethasone and baricitinib. Patient also be given vitamin C, vitamin D and zinc.  Patient given a dose of antibiotics in the ED. We will hold off on giving any further antibiotics as patient symptoms are most like secondary to COVID-19.  · 11/27/2021-patient CRP noted to trend up from 9.89-14.39. LDH noted to trend up from 625-777. Ferritin noted to trend from 4265-7118. · 11/28/2021-patient's inflammatory markers noted to be trending down. · 11/30/2021-patient's inflammatory markers are all trending down  3. Bradycardia-patient has had a some vitals that were taken that showed bradycardia. Patient had similar findings on previous admission. Patient's metoprolol stopped on previous admission. Patient will be continued to be monitored on telemetry. 4. Acquired hypothyroidism-patient with history of hypothyroidism. Patient will be continued on his home dose of Synthroid at 75 mcg. 5. Anxiety-patient has been having issues with anxiety during hospitalization. Patient was started on Ativan on previous admission. Patient will be continued on Ativan during hospitalization. Patient already has a prescription for Ativan on prior admission. Patient will need to follow-up with his primary doctor for further treatment for his anxiety. 6. Insomnia-patient notes that he is having a difficult time being able to sleep. Patient's Decadron changed to p.o. in the morning. Patient's melatonin increased to 10 mg at night as needed.         Electronically signed by Danisha Sheffield MD on 11/30/2021 at 7:29 PM

## 2021-12-01 NOTE — PROGRESS NOTES
[facility]  INPATIENT PHYSICAL THERAPY  DAILY NOTE  STRZ CCU-STEPDOWN 3B - 3B-21/021-A     Time In: 1000  Time Out: 1038  Timed Code Treatment Minutes: 45 Minutes  Minutes: 38          Date: 2021  Patient Name: Colin Kidd,  Gender:  male        MRN: [id number]  : 1980  (46 y.o.)     Referring Practitioner: Dr. Jamison Bañuelos  Diagnosis: acute respiratory failure with hypoxia  Additional Pertinent Hx: admit with above diagnosis, COVID 19 pneumonia, bradycardia, hypothyroidism, anxiety     Prior Level of Function:  Lives With: Spouse (3 kids, ages 24, 25, 15)  Type of Home: House  Home Layout: Two level, Able to Live on Main level with bedroom/bathroom  Home Access: Stairs to enter without rails  Entrance Stairs - Number of Steps: 1+1  Home Equipment: Oxygen (was discharged home with 1L O2 at rest, 4L with activity)   Bathroom Equipment: Shower chair    ADL Assistance: [address]: Independent  Transfer Assistance: Independent  Active : Yes  Additional Comments: Pt reported wife is OT at [facility], would have access to any equipment needed. Pt reported will have close to 24/7 supervision arranged between family members at discharge. Restrictions/Precautions:  Restrictions/Precautions: General Precautions  Position Activity Restriction  Other position/activity restrictions: HFNC at 60L and 45%    SUBJECTIVE: Pt resting in bed and is very agreeable to therapy. RN approved session. PAIN: denies      Vitals: Oxygen: varied 84-89% throughout with being on HFNC noted above    OBJECTIVE:  Bed Mobility:  Supine to Sit: Modified Independent  Sit to Supine: Modified Independent     Transfers:  Sit to Stand: Modified Independent  Stand to Sit:Modified Independent    Ambulation:  Supervision  Distance: 40 ft back and forth in room and sat down due to O2 drop to 84% and not recovering quickly though pt not feeling symptomatic.   Surface: Level Tile  Device:No Device  Gait Deviations:  steady    Balance:  steady    Exercise:  Patient was guided in 1 set(s) 10 reps of exercise to both lower extremities. Ankle pumps, Heelslides, Hip abduction/adduction, Seated marches, Long arc quads and t-band resisted hip abd/add and seated hamstring curls. Exercises were completed for increased independence with functional mobility. Functional Outcome Measures: Completed  AM-PAC Inpatient Mobility Raw Score : 21  AM-PAC Inpatient T-Scale Score : 50.25    ASSESSMENT:  Assessment: Patient progressing toward established goals. and is limited basically by drops in O2 sats. Required several breaks to get O2 sats back >88%. Activity Tolerance:  Patient tolerance of  treatment: good. Equipment Recommendations:Equipment Needed: No  Discharge Recommendations: Continue to assess pending progress and Home with Assist as Needed  Plan: Times per week: 5X GM  Times per day: Daily  Specific instructions for Next Treatment: therex and mobility    Patient Education  Patient Education: Plan of Care, Home Exercise Program, and receptive of increased LE exercises to add to activity. Goals:  Patient goals : return home  Short term goals  Time Frame for Short term goals: by discharge  Short term goal 1: bed mobility with MOD I to get in/out of bed  Short term goal 2: transfer with MOD I to get in/out of chairs  Short term goal 3: amb >150'x1 without AD and MOD I, maintaining O2 sats >90% on </=4L O2, for safe home mobility  Long term goals  Time Frame for Long term goals : no LTGs set secondary to short ELOS    Following session, patient left in safe position with all fall risk precautions in place. Bhakti Molina.  Jame [place] 8

## 2021-12-02 PROCEDURE — 6370000000 HC RX 637 (ALT 250 FOR IP): Performed by: INTERNAL MEDICINE

## 2021-12-02 PROCEDURE — 6360000002 HC RX W HCPCS: Performed by: INTERNAL MEDICINE

## 2021-12-02 PROCEDURE — 6360000002 HC RX W HCPCS: Performed by: HOSPITALIST

## 2021-12-02 PROCEDURE — 2140000000 HC CCU INTERMEDIATE R&B

## 2021-12-02 PROCEDURE — 99232 SBSQ HOSP IP/OBS MODERATE 35: CPT | Performed by: INTERNAL MEDICINE

## 2021-12-02 PROCEDURE — 97110 THERAPEUTIC EXERCISES: CPT

## 2021-12-02 PROCEDURE — 6370000000 HC RX 637 (ALT 250 FOR IP): Performed by: HOSPITALIST

## 2021-12-02 PROCEDURE — 97116 GAIT TRAINING THERAPY: CPT

## 2021-12-02 PROCEDURE — 94761 N-INVAS EAR/PLS OXIMETRY MLT: CPT

## 2021-12-02 PROCEDURE — 6370000000 HC RX 637 (ALT 250 FOR IP): Performed by: PHYSICIAN ASSISTANT

## 2021-12-02 RX ORDER — DEXAMETHASONE 4 MG/1
12 TABLET ORAL DAILY
Status: DISCONTINUED | OUTPATIENT
Start: 2021-12-02 | End: 2021-12-03 | Stop reason: HOSPADM

## 2021-12-02 RX ADMIN — LEVOTHYROXINE SODIUM 75 MCG: 0.07 TABLET ORAL at 09:06

## 2021-12-02 RX ADMIN — ENOXAPARIN SODIUM 30 MG: 100 INJECTION SUBCUTANEOUS at 09:46

## 2021-12-02 RX ADMIN — OXYCODONE HYDROCHLORIDE AND ACETAMINOPHEN 500 MG: 500 TABLET ORAL at 09:45

## 2021-12-02 RX ADMIN — BARICITINIB 4 MG: 2 TABLET, FILM COATED ORAL at 09:46

## 2021-12-02 RX ADMIN — Medication 50 MG: at 09:45

## 2021-12-02 RX ADMIN — PANTOPRAZOLE SODIUM 40 MG: 40 TABLET, DELAYED RELEASE ORAL at 09:46

## 2021-12-02 RX ADMIN — Medication 2000 UNITS: at 09:45

## 2021-12-02 RX ADMIN — DEXAMETHASONE 12 MG: 4 TABLET ORAL at 09:51

## 2021-12-02 RX ADMIN — ENOXAPARIN SODIUM 30 MG: 100 INJECTION SUBCUTANEOUS at 21:40

## 2021-12-02 RX ADMIN — ASPIRIN 81 MG CHEWABLE TABLET 81 MG: 81 TABLET CHEWABLE at 09:46

## 2021-12-02 ASSESSMENT — PAIN SCALES - GENERAL: PAINLEVEL_OUTOF10: 0

## 2021-12-02 NOTE — PROGRESS NOTES
[address] [address]  INPATIENT PHYSICAL THERAPY  DAILY NOTE  STRZ CCU-STEPDOWN 3B - 3B-21/021-A    Time In: 1104  Time Out: 1130  Timed Code Treatment Minutes: 26 Minutes  Minutes: 26          Date: 2021  Patient Name: Tasneem Nelson,  Gender:  male        MRN: [id number]  : 1980  (46 y.o.)     Referring Practitioner: Dr. Dmitry Hermosillo  Diagnosis: acute respiratory failure with hypoxia  Additional Pertinent Hx: admit with above diagnosis, COVID 19 pneumonia, bradycardia, hypothyroidism, anxiety     Prior Level of Function:  Lives With: Spouse (3 kids, ages 24, 25, 15)  Type of Home: House  Home Layout: Two level, Able to Live on Main level with bedroom/bathroom  Home Access: Stairs to enter without rails  Entrance Stairs - Number of Steps: 1+1  Home Equipment: Oxygen (was discharged home with 1L O2 at rest, 4L with activity)   Bathroom Equipment: Shower chair    ADL Assistance: [address] [place] [address] Avenue: [address] [place] [address] Place: Independent  Transfer Assistance: Independent  Active : Yes  Additional Comments: Pt reported wife is OT at [facility], would have access to any equipment needed. Pt reported will have close to  supervision arranged between family members at discharge. Restrictions/Precautions:  Restrictions/Precautions: General Precautions  Position Activity Restriction  Other position/activity restrictions: HFNC     SUBJECTIVE: pleasant and cooperative, pt stated had a great day yesterday, but is more tired today.     PAIN: no pain per pt    Vitals: Oxygen: HFNC upon arrival at 35L and 37% with O2 sats around 88-90%, with activity inc to 71% to maintain at >/=88%, attempted to dec again but pt dropping to O2 sats of 85% and required back up to 71%-RN and respiratory therapist aware    OBJECTIVE:  Bed Mobility:  Not Tested    Transfers:  Sit to Stand: Modified Independent  Stand to Sit:Modified Independent    Ambulation:  Modified Independent  Distance: 24'x2  Surface: Level Tile  Device:No Device  Gait Deviations:  None and monitoring O2 sats and required rest break due to dec O2 sats-see vitals above, no LOB, steady, pt aware of self monitoring        Exercise:  PROM heelcord and hamstring stretch hold 20 sec x3 reps. Exercises were completed for increased independence with functional mobility. Functional Outcome Measures: Completed  AM-PAC Inpatient Mobility without Stair Climbing Raw Score : 20  AM-PAC Inpatient without Stair Climbing T-Scale Score : 60.57    ASSESSMENT:  Assessment: Patient progressing toward established goals. Activity Tolerance:  Patient tolerance of  treatment: good. -     Equipment Recommendations:Equipment Needed: No  Discharge Recommendations: Continue to assess pending progress and Home Independently  Plan: Times per week: 5X GM  Times per day: Daily  Specific instructions for Next Treatment: therex and mobility    Patient Education  Patient Education: Gait    Goals:  Patient goals : return home  Short term goals  Time Frame for Short term goals: by discharge  Short term goal 1: bed mobility with MOD I to get in/out of bed  Short term goal 2: transfer with MOD I to get in/out of chairs  Short term goal 3: amb >150'x1 without AD and MOD I, maintaining O2 sats >90% on </=4L O2, for safe home mobility  Long term goals  Time Frame for Long term goals : no LTGs set secondary to short ELOS    Following session, patient left in safe position with all fall risk precautions in place.

## 2021-12-02 NOTE — PROGRESS NOTES
Hospitalist Progress Note    Patient:  Michael Bello      Unit/Bed:3B-21/021-A    YOB: 1980    MRN: [id number]       Acct: [de-identified]     PCP: Zafar Escamilla MD    Date of Admission: 11/25/2021      Subjective: Patient seen and examined. Patient reports feeling slightly better this morning. Patient reports that he had a rough night last night because of the coughing spell. He is down to 30% of FiO2 this morning    Medications:  Reviewed    Infusion Medications    sodium chloride 75 mL/hr at 11/28/21 1317    sodium chloride 25 mL (11/26/21 2053)     Scheduled Medications    dexamethasone  12 mg Oral Daily    enoxaparin  30 mg SubCUTAneous Q12H    HYDROcodone-acetaminophen  1.5 tablet Oral Once    baricitinib  4 mg Oral Daily    levothyroxine  75 mcg Oral Daily    ascorbic acid  500 mg Oral Daily    aspirin  81 mg Oral Daily    pantoprazole  40 mg Oral QAM AC    zinc sulfate  50 mg Oral Daily    sodium chloride flush  5-40 mL IntraVENous 2 times per day    Vitamin D  2,000 Units Oral Daily     PRN Meds: melatonin, LORazepam, sodium chloride flush, sodium chloride, ondansetron **OR** ondansetron, polyethylene glycol, acetaminophen **OR** acetaminophen      Intake/Output Summary (Last 24 hours) at 12/2/2021 1119  Last data filed at 12/1/2021 2119  Gross per 24 hour   Intake 200 ml   Output --   Net 200 ml       Diet:  ADULT DIET; Regular    Exam:  BP (!) 92/50   Pulse 94   Temp 98.4 °F (36.9 °C) (Oral)   Resp 20   Ht 5' 6\" (1.676 m)   Wt 184 lb 6.4 oz (83.6 kg)   SpO2 91%   BMI 29.76 kg/m²   Physical Exam  Constitutional:       Appearance: Normal appearance. HENT:      Head: Normocephalic and atraumatic. Right Ear: External ear normal.      Left Ear: External ear normal.      Nose: Nose normal.      Mouth/Throat:      Pharynx: Oropharynx is clear. Eyes:      Extraocular Movements: Extraocular movements intact. Pupils: Pupils are equal, round, and reactive to light. Cardiovascular:      Rate and Rhythm: Normal rate and regular rhythm. Pulses: Normal pulses. Heart sounds: Normal heart sounds. No murmur heard. No gallop. Pulmonary:      Effort: Pulmonary effort is normal. No respiratory distress. Breath sounds: Normal breath sounds. No wheezing, rhonchi or rales. Comments: High flow oxygen in place  Abdominal:      General: Bowel sounds are normal. There is no distension. Tenderness: There is no abdominal tenderness. Musculoskeletal:         General: No swelling. Normal range of motion. Cervical back: Normal range of motion and neck supple. Skin:     General: Skin is warm. Neurological:      General: No focal deficit present. Mental Status: He is alert. Labs:   Recent Labs     11/30/21 0325 12/01/21 0414   WBC 7.8 8.1   HGB 14.5 15.2   HCT 42.1 44.7    248     Recent Labs     11/30/21 0325 12/01/21 0414    135   K 3.9 5.1    104   CO2 21* 23   BUN 21 16   CREATININE 1.0 1.0   CALCIUM 8.1* 8.4*     No results for input(s): AST, ALT, BILIDIR, BILITOT, ALKPHOS in the last 72 hours. No results for input(s): INR in the last 72 hours. No results for input(s): Marcelle [place]ras in the last 72 hours. Urinalysis:      Lab Results   Component Value Date    NITRU NEGATIVE 11/25/2021    WBCUA 0-2 11/25/2021    BACTERIA NONE SEEN 11/25/2021    RBCUA 3-5 11/25/2021    BLOODU NEGATIVE 11/25/2021    GLUCOSEU NEGATIVE 11/25/2021       Radiology:  CTA CHEST W WO CONTRAST - r/o Pulmonary Embolism   Final Result      1. Extensive bilateral pneumonia. Worse appearance of the chest when compared to the previous CT scan. 2. No pulmonary moles. **This report has been created using voice recognition software. It may contain minor errors which are inherent in voice recognition technology. **      Final report electronically signed by Dr Flakito Robins on 11/25/2021 7:09 PM      XR CHEST PORTABLE   Final Result   1. Diffuse airspace opacities seen bilaterally relating to underlying infectious etiology. **This report has been created using voice recognition software. It may contain minor errors which are inherent in voice recognition technology. **      Final report electronically signed by Dr Sebastian Church on 11/25/2021 3:44 PM          Diet: ADULT DIET; Regular    DVT prophylaxis: [x] Lovenox                                 [] SCDs                                 [] SQ Heparin                                 [] Encourage ambulation           [] Already on Anticoagulation     Disposition:    [x] Home       [] TCU       [] Rehab       [] Psych       [] SNF       [] [facility]       [] Other-    Code Status: Full Code    PT/OT Eval Status: On board    Assessment/Plan:    Anticipated Discharge in : To be determined    Active Hospital Problems    Diagnosis Date Noted    Pneumonia due to severe acute respiratory syndrome coronavirus 2 (SARS-CoV-2) [U07.1, J12.82] 11/25/2021    Bradycardia [R00.1] 11/23/2021    Acquired hypothyroidism [E03.9] 11/21/2021    Acute hypoxemic respiratory failure due to COVID-19 ([facility] [address] [U07.1, J96.01] 11/20/2021       1. Acute respiratory failure with hypoxia-patient noted to have an oxygen saturation of 75% on room air. Patient initially placed on a nonrebreather and later transferred to high flow oxygen. Patient's oxygen will be weaned down as able. · 11/27/2021-patient continues to be on high flow oxygen. · 11/28/2021-patient continues to be on high flow oxygen. Oxygen has been weaned down. Patient is feeling better breathing. · 11/29/2021-patient on high flow oxygen, currently being titrated down. · 11/30/2021-patient continues on high flow oxygen that is being titrated down  · 12/2/2021, patient still on high flow oxygen but is down to 30% of FiO2.  2. Pneumonia secondary to COVID-19-patient first tested for Covid on 11/9/2021 with a home kit.   When patient was admitted on previous admission, patient tested negative for COVID-19. Respiratory panel that was sent does show that patient is positive for COVID-19. Patient treated with dexamethasone and antibiotics in previous admission. Patient will be continued on dexamethasone and baricitinib. Patient also be given vitamin C, vitamin D and zinc.  Patient given a dose of antibiotics in the ED. We will hold off on giving any further antibiotics as patient symptoms are most like secondary to COVID-19.  · 11/27/2021-patient CRP noted to trend up from 9.89-14.39. LDH noted to trend up from 625-777. Ferritin noted to trend from 9920-8124. · 11/28/2021-patient's inflammatory markers noted to be trending down. · 11/30/2021-patient's inflammatory markers are all trending down  3. Bradycardia-patient has had a some vitals that were taken that showed bradycardia. Patient had similar findings on previous admission. Patient's metoprolol stopped on previous admission. Patient will be continued to be monitored on telemetry. 4. Acquired hypothyroidism-patient with history of hypothyroidism. Patient will be continued on his home dose of Synthroid at 75 mcg. 5. Anxiety-patient has been having issues with anxiety during hospitalization. Patient was started on Ativan on previous admission. Patient will be continued on Ativan during hospitalization. Patient already has a prescription for Ativan on prior admission. Patient will need to follow-up with his primary doctor for further treatment for his anxiety. 6. Insomnia-patient notes that he is having a difficult time being able to sleep. Patient's Decadron changed to p.o. in the morning. Patient's melatonin increased to 10 mg at night as needed.         Electronically signed by Dwayne Gillespie MD on 12/2/2021 at 11:19 AM

## 2021-12-03 VITALS
BODY MASS INDEX: 29.77 KG/M2 | SYSTOLIC BLOOD PRESSURE: 94 MMHG | RESPIRATION RATE: 18 BRPM | HEIGHT: 66 IN | HEART RATE: 66 BPM | DIASTOLIC BLOOD PRESSURE: 62 MMHG | TEMPERATURE: 98.9 F | WEIGHT: 185.2 LBS | OXYGEN SATURATION: 96 %

## 2021-12-03 PROCEDURE — 6360000002 HC RX W HCPCS: Performed by: HOSPITALIST

## 2021-12-03 PROCEDURE — 97110 THERAPEUTIC EXERCISES: CPT

## 2021-12-03 PROCEDURE — 6360000002 HC RX W HCPCS: Performed by: INTERNAL MEDICINE

## 2021-12-03 PROCEDURE — 99239 HOSP IP/OBS DSCHRG MGMT >30: CPT | Performed by: INTERNAL MEDICINE

## 2021-12-03 PROCEDURE — 6370000000 HC RX 637 (ALT 250 FOR IP): Performed by: PHYSICIAN ASSISTANT

## 2021-12-03 PROCEDURE — 6370000000 HC RX 637 (ALT 250 FOR IP): Performed by: INTERNAL MEDICINE

## 2021-12-03 PROCEDURE — 97116 GAIT TRAINING THERAPY: CPT

## 2021-12-03 PROCEDURE — 6370000000 HC RX 637 (ALT 250 FOR IP): Performed by: HOSPITALIST

## 2021-12-03 RX ORDER — DEXAMETHASONE 6 MG/1
12 TABLET ORAL
Qty: 4 TABLET | Refills: 0 | Status: SHIPPED | OUTPATIENT
Start: 2021-12-03 | End: 2021-12-05

## 2021-12-03 RX ADMIN — PANTOPRAZOLE SODIUM 40 MG: 40 TABLET, DELAYED RELEASE ORAL at 10:12

## 2021-12-03 RX ADMIN — BARICITINIB 4 MG: 2 TABLET, FILM COATED ORAL at 10:12

## 2021-12-03 RX ADMIN — DEXAMETHASONE 12 MG: 4 TABLET ORAL at 10:12

## 2021-12-03 RX ADMIN — Medication 50 MG: at 10:12

## 2021-12-03 RX ADMIN — Medication 2000 UNITS: at 10:12

## 2021-12-03 RX ADMIN — ENOXAPARIN SODIUM 30 MG: 100 INJECTION SUBCUTANEOUS at 14:44

## 2021-12-03 RX ADMIN — ASPIRIN 81 MG CHEWABLE TABLET 81 MG: 81 TABLET CHEWABLE at 10:12

## 2021-12-03 RX ADMIN — LEVOTHYROXINE SODIUM 75 MCG: 0.07 TABLET ORAL at 04:26

## 2021-12-03 RX ADMIN — OXYCODONE HYDROCHLORIDE AND ACETAMINOPHEN 500 MG: 500 TABLET ORAL at 10:12

## 2021-12-03 NOTE — PROGRESS NOTES
12/03/21 1552   Resting (Room Air)   SpO2 90      During Walk (Room Air)   SpO2 85      Walk/Assistance Device Ambulation   Rate of Dyspnea 0   During Walk (On O2)   SpO2 90      O2 Device Nasal cannula   O2 Flow Rate (l/min) 5 l/min   Walk/Assistance Device Ambulation   Rate of Dyspnea 0   After Walk   SpO2 91      O2 Device Nasal cannula   O2 Flow Rate (l/min) 4 l/min   Rate of Dyspnea 0   Does the Patient Qualify for Home O2 Yes   Liter Flow at Rest 0   Liter Flow on Exertion 5   Does the Patient Need Portable Oxygen Tanks Yes

## 2021-12-03 NOTE — PROGRESS NOTES
[address] [address]  INPATIENT PHYSICAL THERAPY  DAILY NOTE  [facility] CCU-STEPDOWN 3B - 3B-21/021-A     Time In: 5231  Time Out: 0957  Timed Code Treatment Minutes: 34 Minutes  Minutes: 29          Date: 12/3/2021  Patient Name: Kitty Kaba,  Gender:  male        MRN: [id number]  : 1980  (46 y.o.)     Referring Practitioner: Dr. Becka Manriquez  Diagnosis: acute respiratory failure with hypoxia  Additional Pertinent Hx: admit with above diagnosis, COVID 19 pneumonia, bradycardia, hypothyroidism, anxiety     Prior Level of Function:  Lives With: Spouse (3 kids, ages 24, 25, 15)  Type of Home: House  Home Layout: Two level, Able to Live on Main level with bedroom/bathroom  Home Access: Stairs to enter without rails  Entrance Stairs - Number of Steps: 1+1  Home Equipment: Oxygen (was discharged home with 1L O2 at rest, 4L with activity)   Bathroom Equipment: Shower chair    ADL Assistance: [address]: Independent  Transfer Assistance: Independent  Active : Yes  Additional Comments: Pt reported wife is OT at [facility], would have access to any equipment needed. Pt reported will have close to  supervision arranged between family members at discharge. Restrictions/Precautions:  Restrictions/Precautions: General Precautions  Position Activity Restriction  Other position/activity restrictions: O2 at 6L    SUBJECTIVE: Pt up in chair and agrees to therapy. RN approved session. PAIN: denies      Vitals: Oxygen: On 6 L/min throughout. Sats varied 87-96% throughout with activity including ambulation and exercises.     OBJECTIVE:  Bed Mobility:  Not Tested    Transfers:  Sit to Stand: Modified Independent  Stand to Sit:Modified Independent    Ambulation:  Supervision  Distance: 480 ft  Surface: Level Tile  Device:managed own O2 tank in cart  Gait Deviations:  Decreased Gait Speed to be able to maintain saturations    Balance:  generally steady    Exercise:  Patient was guided in 1 set(s) 10 reps of exercise to both lower extremities. Ankle pumps, Shoulder horizontal abduction/adduction, Seated marches, Seated heel/toe raises and Long arc quads. Exercises were completed for increased independence with functional mobility. Functional Outcome Measures: Completed  AM-PAC Inpatient Mobility Raw Score : 21  AM-PAC Inpatient T-Scale Score : 50.25    ASSESSMENT:  Assessment: Patient progressing toward established goals. Activity Tolerance:  Patient tolerance of  treatment: good. Equipment Recommendations:Equipment Needed: No  Discharge Recommendations: Home with Assist as Needed  Plan: Times per week: 5X GM  Times per day: Daily  Specific instructions for Next Treatment: therex and mobility    Patient Education  Patient Education: Plan of Care, Home Exercise Program    Goals:  Patient goals : return home  Short term goals  Time Frame for Short term goals: by discharge  Short term goal 1: bed mobility with MOD I to get in/out of bed  Short term goal 2: transfer with MOD I to get in/out of chairs  Short term goal 3: amb >150'x1 without AD and MOD I, maintaining O2 sats >90% on </=4L O2, for safe home mobility  Long term goals  Time Frame for Long term goals : no LTGs set secondary to short ELOS    Following session, patient left in safe position with all fall risk precautions in place. Climmie Cheyanne.  Earleen Drain, [place] [facility]

## 2021-12-03 NOTE — PROGRESS NOTES
Patient was evaluated today for the diagnosis of covid. I entered a DME order for home oxygen because the diagnosis and testing requires the patient to have supplemental oxygen. Condition will improve or be benefited by oxygen use. The patient is not able to perform good mobility in a home setting and therefore does require the use of a portable oxygen system. The need for this equipment was discussed with the patient and he understands and is in agreement.

## 2021-12-03 NOTE — DISCHARGE SUMMARY
[facility]    Discharge Summary     Patient ID: Bijan Leary  :  1980   MRN: [id number]     ACCOUNT:  [de-identified]   Patient's PCP: David Wyman MD  Admit Date: 2021   Discharge Date: 12/3/2021     Length of Stay: 8  Code Status:  Full Code  Admitting Physician: Maite Rojo MD  Discharge Physician: Kim Mckeon MD     Active Discharge Diagnoses:     Hospital Problem Lists:  Active Problems:    Acute hypoxemic respiratory failure due to COVID-19 [facility])    Acquired hypothyroidism    Bradycardia    Pneumonia due to severe acute respiratory syndrome coronavirus 2 (SARS-CoV-2)  Resolved Problems:    * No resolved hospital problems. *      Admission Condition:  poor     Discharged Condition: good    Hospital Stay:     Hospital Course:      59-year-old male who was admitted with COVID-19 pneumonia. Patient was admitted for 8 days. He got treated with Decadron and baricitinib. Patient was requiring high flow oxygen initially and is down to nasal cannula this morning. Patient underwent home O2 evaluation and is stable for discharge on home oxygen. Patient is to follow-up with his PCP within 1 week after discharge.       Significant therapeutic interventions: Continue Decadron for 2 more days    Significant Diagnostic Studies:   Labs / Micro:  CBC:   Lab Results   Component Value Date    WBC 8.1 2021    RBC 5.06 2021    HGB 15.2 2021    HCT 44.7 2021    MCV 88.3 2021    MCH 30.0 2021    MCHC 34.0 2021    RDW 13.1 10/09/2017     2021     BMP:    Lab Results   Component Value Date    GLUCOSE 127 2021     2021    K 5.1 2021    K 3.9 2021     2021    CO2 23 2021    ANIONGAP 8.0 2021    BUN 16 2021    CREATININE 1.0 2021    CALCIUM 8.4 2021    LABGLOM 83 2021     HFP:    Lab Results   Component Value Date    PROT 6.0 2021 CMP:    Lab Results   Component Value Date    GLUCOSE 127 12/01/2021     12/01/2021    K 5.1 12/01/2021    K 3.9 11/25/2021     12/01/2021    CO2 23 12/01/2021    BUN 16 12/01/2021    CREATININE 1.0 12/01/2021    ANIONGAP 8.0 12/01/2021    ALKPHOS 73 11/20/2021    ALT 36 11/20/2021    AST 53 11/20/2021    BILITOT 0.4 11/20/2021    LABALBU 3.7 11/20/2021    LABGLOM 83 12/01/2021    PROT 6.0 11/20/2021    CALCIUM 8.4 12/01/2021     PT/INR:  No results found for: PTINR, PROTIME, INR  PTT: No results found for: APTT  FLP:  No results found for: CHOL, TRIG, HDL  U/A:    Lab Results   Component Value Date    COLORU YELLOW 11/25/2021    PHUR 6.5 11/25/2021    PROTEINU 30 11/25/2021    GLUCOSEU NEGATIVE 11/25/2021    KETUA NEGATIVE 11/25/2021    BILIRUBINUR NEGATIVE 11/25/2021    UROBILINOGEN 1.0 11/25/2021    NITRU NEGATIVE 11/25/2021    LEUKOCYTESUR NEGATIVE 11/25/2021     TSH:    Lab Results   Component Value Date    TSH 1.910 11/25/2021        Radiology:  No results found. Consultations:    Consults:     Final Specialist Recommendations/Findings:   None      The patient was seen and examined on day of discharge and this discharge summary is in conjunction with any daily progress note from day of discharge. Discharge plan:     Disposition: Home    Physician Follow Up:     Krista Evans MD  [facility] [id number] [phone]             Requiring Further Evaluation/Follow Up POST HOSPITALIZATION/Incidental Findings:  Follow-up with PCP within 1 week after discharge    Diet: regular diet    Activity: As tolerated    Instructions to Patient: Be compliant with diet, medication, follow-ups    Discharge Medications:      Medication List      CHANGE how you take these medications    dexamethasone 6 MG tablet  Commonly known as: DECADRON  Take 2 tablets by mouth daily (with breakfast) for 2 days  What changed:   · how much to take  · when to take this        CONTINUE taking these medications aluminum & magnesium hydroxide-simethicone 200-200-20 MG/5ML Susp suspension  Commonly known as: MAALOX  Take 30 mLs by mouth every 6 hours as needed for Indigestion     ascorbic acid 500 MG tablet  Commonly known as: VITAMIN C  Take 1 tablet by mouth daily     aspirin 81 MG chewable tablet  Take 1 tablet by mouth daily     calcium carbonate 500 MG chewable tablet  Commonly known as: TUMS  Take 2 tablets by mouth 3 times daily as needed for Heartburn     levothyroxine 75 MCG tablet  Commonly known as: SYNTHROID     LORazepam 1 MG tablet  Commonly known as: ATIVAN  Take 1 tablet by mouth every 4 hours as needed for Anxiety for up to 30 days. melatonin 3 MG Tabs tablet  Take 2 tablets by mouth nightly as needed (sleep)     pantoprazole 40 MG tablet  Commonly known as: PROTONIX  Take 1 tablet by mouth every morning (before breakfast)     sildenafil 20 MG tablet  Commonly known as: Revatio  Take 1 tablet by mouth as needed (ED)     vitamin D 1000 UNIT Tabs tablet  Commonly known as: CHOLECALCIFEROL     zinc sulfate 220 (50 Zn) MG capsule  Commonly known as: ZINCATE  Take 1 capsule by mouth daily        STOP taking these medications    azithromycin 500 MG tablet  Commonly known as: Zithromax           Where to Get Your Medications      These medications were sent to [address] [place] [address] , [address]  [address] [place] [address] [address]st Floor, [address] [address]    Phone: [phone]   · dexamethasone 6 MG tablet         No discharge procedures on file. Time Spent on discharge is  40 mins in patient examination, evaluation, counseling as well as medication reconciliation, prescriptions for required medications, discharge plan and follow up. Electronically signed by   Xiomara Barahona MD  12/3/2021  6:27 PM      Thank you Dr. Lizzette Rivera MD for the opportunity to be involved in this patient's care.

## 2021-12-04 NOTE — PROGRESS NOTES
AVS discussed with patient. All questions and concerns answered. Discharged in stable condition per wheelchair.

## 2021-12-30 NOTE — ADT AUTH CERT
Pneumonia - Care Day 8 (12/2/2021) by Juli Adorno, RN       Review Status Review Entered   Completed 12/8/2021 10:43      Criteria Review      Care Day: 8 Care Date: 12/2/2021 Level of Care: Intermediate Care    Guideline Day 3    Clinical Status    ( ) * Hemodynamic stability    12/8/2021 10:43 AM EST by Becka Schroeder      BP (!) 92/50   Pulse 94    (X) * Afebrile or temperature acceptable for next level of care    12/8/2021 10:43 AM EST by Becka Schroeder      98.4    ( ) * Tachypnea absent    12/8/2021 10:43 AM EST by Claudia Rangel      rr 20    ( ) * Hypoxemia absent    12/8/2021 10:43 AM EST by Becka Schroeder      91% - High flow oxygen in place    (X) * Mental status at baseline    12/8/2021 10:43 AM EST by Claudia Rangel      alert    (X) * Antibiotic regimen acceptable for next level of care    12/8/2021 10:43 AM EST by Claudia Rangel      absent    ( ) * Discharge plans and education understood    Activity    (X) * Ambulatory or acceptable for next level of care    12/8/2021 10:43 AM EST by Claudia Rangel      aat    Routes    (X) * Oral hydration    (X) * Oral medications or regimen acceptable for next level of care    12/8/2021 10:43 AM EST by Claudia Rangel      dexamethasone 12 mgOralDaily  baricitinib 4 mgOralDaily    (X) * Oral diet or acceptable for next level of care    12/8/2021 10:43 AM EST by Claudia Rangel      ADULT DIET; Regular    Interventions    ( ) * Oxygen absent or at baseline need    12/8/2021 10:43 AM EST by Becka Schroeder      High flow oxygen in place    (X) * Isolation not indicated, or is performable at next level of care    * Milestone   Additional Notes   DATE: 12/2       Pertinent Updates:   Remains on high flow, FiO2 38, 35L, sat is 91%. Vitals:   BP (!) 92/50   Pulse 94   Temp 98.4 °F (36.9 °C) (Oral)   Resp 20     Remains on high flow, FiO2 38, 35L, sat is 91%. Physical Exam:   Constitutional:        Appearance: Normal appearance.     HENT:    Head: Normocephalic and atraumatic.       Right Ear: External ear normal.       Left Ear: External ear normal.       Nose: Nose normal.       Mouth/Throat:       Pharynx: Oropharynx is clear. Eyes:       Extraocular Movements: Extraocular movements intact.       Pupils: Pupils are equal, round, and reactive to light. Cardiovascular:       Rate and Rhythm: Normal rate and regular rhythm.       Pulses: Normal pulses.       Heart sounds: Normal heart sounds. No murmur heard. No gallop.     Pulmonary:       Effort: Pulmonary effort is normal. No respiratory distress.       Breath sounds: Normal breath sounds. No wheezing, rhonchi or rales.       Comments: High flow oxygen in place   Abdominal:       General: Bowel sounds are normal. There is no distension.       Tenderness: There is no abdominal tenderness. Musculoskeletal:          General: No swelling. Normal range of motion.       Cervical back: Normal range of motion and neck supple. Skin:      General: Skin is warm. Neurological:       General: No focal deficit present.       Mental Status: He is alert. Abnl/Pertinent Labs/Radiology/Diagnostic Studies:   No Labs         Medications:   Infusions Meds   · sodium chloride 75 mL/hr at 11/28/21 1317      Scheduled Medications   · dexamethasone 12 mg Oral Daily   · enoxaparin 30 mg SubCUTAneous Q12H   · HYDROcodone-acetaminophen 1.5 tablet Oral Once   · baricitinib 4 mg Oral Daily   · levothyroxine 75 mcg Oral Daily   · ascorbic acid 500 mg Oral Daily   · aspirin 81 mg Oral Daily   · pantoprazole 40 mg Oral QAM AC   · zinc sulfate 50 mg Oral Daily            MD Consults/Assessments & Plans:   Per IM   1. Acute respiratory failure with hypoxia-patient noted to have an oxygen saturation of 75% on room air.  Patient initially placed on a nonrebreather and later transferred to high flow oxygen.  Patient's oxygen will be weaned down as able.    · 11/27/2021-patient continues to be on high flow oxygen. · 11/28/2021-patient continues to be on high flow oxygen.  Oxygen has been weaned down.  Patient is feeling better breathing. · 11/29/2021-patient on high flow oxygen, currently being titrated down. · 11/30/2021-patient continues on high flow oxygen that is being titrated down   · 12/2/2021, patient still on high flow oxygen but is down to 30% of FiO2.   2. Pneumonia secondary to COVID-19-patient first tested for Covid on 11/9/2021 with a home kit.  When patient was admitted on previous admission, patient tested negative for COVID-19.  Respiratory panel that was sent does show that patient is positive for COVID-19.  Patient treated with dexamethasone and antibiotics in previous admission. Magdalena Carson will be continued on dexamethasone and baricitinib.  Patient also be given vitamin C, vitamin D and zinc.  Patient given a dose of antibiotics in the ED.  We will hold off on giving any further antibiotics as patient symptoms are most like secondary to COVID-19.   · 11/27/2021-patient CRP noted to trend up from 9.89-14. 44.  LDH noted to trend up from 625-777.  Ferritin noted to trend from 7262-6165. · 11/28/2021-patient's inflammatory markers noted to be trending down. · 11/30/2021-patient's inflammatory markers are all trending down   3. Bradycardia-patient has had a some vitals that were taken that showed bradycardia.  Patient had similar findings on previous admission.  Patient's metoprolol stopped on previous admission. Magdalena Carson will be continued to be monitored on telemetry. 4. Acquired hypothyroidism-patient with history of hypothyroidism.  Patient will be continued on his home dose of Synthroid at 75 mcg.    5. Anxiety-patient has been having issues with anxiety during hospitalization.  Patient was started on Ativan on previous admission. Magdalena Carson will be continued on Ativan during hospitalization. Magdalena Carson already has a prescription for Ativan on prior admission. Magdalena Carson will need to follow-up with his primary doctor for further treatment for his anxiety. 6. Insomnia-patient notes that he is having a difficult time being able to sleep.  Patient's Decadron changed to p.o. in the morning.  Patient's melatonin increased to 10 mg at night as needed.                 Pneumonia - Care Day 6 (11/30/2021) by Floyd Mary RN       Review Status Review Entered   Completed 12/8/2021 10:43      Criteria Review      Care Day: 6 Care Date: 11/30/2021 Level of Care: Intermediate Care    Guideline Day 3    Clinical Status    (X) * Hemodynamic stability    12/8/2021 10:40 AM EST by Becka Schroeder      BP (!) 95/58   Pulse 77    (X) * Afebrile or temperature acceptable for next level of care    12/8/2021 10:40 AM EST by Regis Lemon      98    ( ) * Tachypnea absent    12/8/2021 10:40 AM EST by Regis Lemon      rr 24    ( ) * Hypoxemia absent    12/8/2021 10:40 AM EST by Becka Schroeder      SpO2 93%- High flow oxygen in place    (X) * Mental status at baseline    12/8/2021 10:40 AM EST by Regis Lemon      alert    (X) * Antibiotic regimen acceptable for next level of care    12/8/2021 10:40 AM EST by Regis Lemon      absent    ( ) * Discharge plans and education understood    Activity    (X) * Ambulatory or acceptable for next level of care    12/8/2021 10:40 AM EST by Regis Lemon      aat    Routes    (X) * Oral hydration    12/8/2021 10:40 AM EST by Regis Lemon      yes    (X) * Oral medications or regimen acceptable for next level of care    12/8/2021 10:40 AM EST by Regis Lemon      dexamethasone 6 mgOralDaily  baricitinib 4 mgOralDaily    (X) * Oral diet or acceptable for next level of care    12/8/2021 10:40 AM EST by Jhonny DAWSON;  Regular    Interventions    ( ) * Oxygen absent or at baseline need    12/8/2021 10:40 AM EST by Becka Schroeder      High flow oxygen in place    (X) * Isolation not indicated, or is performable at next level of care    * Milestone home kit.  When patient was admitted on previous admission, patient tested negative for COVID-19.  Respiratory panel that was sent does show that patient is positive for COVID-19.  Patient treated with dexamethasone and antibiotics in previous admission. Agusto Fall will be continued on dexamethasone and baricitinib.  Patient also be given vitamin C, vitamin D and zinc.  Patient given a dose of antibiotics in the ED.  We will hold off on giving any further antibiotics as patient symptoms are most like secondary to COVID-19.   · 11/27/2021-patient CRP noted to trend up from 9.89-14. 44.  LDH noted to trend up from 625-777.  Ferritin noted to trend from 4064-0722. · 11/28/2021-patient's inflammatory markers noted to be trending down. · 11/30/2021-patient's inflammatory markers are all trending down   3. Bradycardia-patient has had a some vitals that were taken that showed bradycardia.  Patient had similar findings on previous admission.  Patient's metoprolol stopped on previous admission. Agusto Fall will be continued to be monitored on telemetry. 4. Acquired hypothyroidism-patient with history of hypothyroidism.  Patient will be continued on his home dose of Synthroid at 75 mcg. 5. Anxiety-patient has been having issues with anxiety during hospitalization.  Patient was started on Ativan on previous admission. Agusto Fall will be continued on Ativan during hospitalization.  Patient already has a prescription for Ativan on prior admission. Agusto Fall will need to follow-up with his primary doctor for further treatment for his anxiety. 6. Insomnia-patient notes that he is having a difficult time being able to sleep.  Patient's Decadron changed to p.o. in the morning.  Patient's melatonin increased to 10 mg at night as needed.

## 2022-01-03 ENCOUNTER — OFFICE VISIT (OUTPATIENT)
Dept: CARDIOLOGY CLINIC | Age: 42
End: 2022-01-03
Payer: COMMERCIAL

## 2022-01-03 VITALS
HEART RATE: 73 BPM | OXYGEN SATURATION: 97 % | WEIGHT: 196.4 LBS | BODY MASS INDEX: 31.57 KG/M2 | HEIGHT: 66 IN | DIASTOLIC BLOOD PRESSURE: 74 MMHG | SYSTOLIC BLOOD PRESSURE: 107 MMHG

## 2022-01-03 DIAGNOSIS — G90.A POTS (POSTURAL ORTHOSTATIC TACHYCARDIA SYNDROME): ICD-10-CM

## 2022-01-03 DIAGNOSIS — R94.31 ABNORMAL EKG: ICD-10-CM

## 2022-01-03 DIAGNOSIS — R53.83 FATIGUE, UNSPECIFIED TYPE: ICD-10-CM

## 2022-01-03 DIAGNOSIS — R06.02 SOB (SHORTNESS OF BREATH) ON EXERTION: ICD-10-CM

## 2022-01-03 PROCEDURE — 99204 OFFICE O/P NEW MOD 45 MIN: CPT | Performed by: INTERNAL MEDICINE

## 2022-01-03 RX ORDER — ONDANSETRON 4 MG/1
TABLET, ORALLY DISINTEGRATING ORAL
COMMUNITY
Start: 2021-12-29 | End: 2022-02-07

## 2022-01-03 NOTE — PROGRESS NOTES
Chief Complaint   Patient presents with    New Patient    Follow-Up from [address] [place] [address] 2021  And was admitted to hospital  Still on home O2 at night at night  amalia on cpap    New PT. Hospital fu.  Infarct concern Abnormal EKG      Pt has POTS Dxed 2017 at [facility] on hydration with water and getored not on med  Hx of syncope in 2017 and none after    Previous pt of Dr Tyrone Bueno 2017    Sob on exertion and worse after covid    fatigue and generalized body weakness post Covid    Denies chest pain post covid    Hx of leg edema post hospital d/c and now getting better    Pt is a     Ex smoker    fhx  None for  CAD in 1st degree relatives    Past Surgical History:   Procedure Laterality Date    PAIN MANAGEMENT PROCEDURE Bilateral 2021    Bilateral L-facet MBB @ L4-5 and L5-S1. performed by David Breen MD at [facility] Bilateral 2021    Lumbar Facet MBB @L4-5, 5-S1 bilateral #2 performed by David Breen MD at [address] [place] [address]         Allergies   Allergen Reactions    Codeine Other (See Comments)     hallucinations    Azithromycin Rash        Family History   Problem Relation Age of Onset    Cancer Father     Cancer Maternal Grandfather     Heart Disease Paternal Uncle         Social History     Socioeconomic History    Marital status:      Spouse name: Not on file    Number of children: Not on file    Years of education: Not on file    Highest education level: Not on file   Occupational History    Not on file   Tobacco Use    Smoking status: Former Smoker     Quit date: 2016     Years since quittin.3    Smokeless tobacco: Never Used   Vaping Use    Vaping Use: Never used   Substance and Sexual Activity    Alcohol use: No    Drug use: No    Sexual activity: Not on file   Other Topics Concern    Not on file   Social History Narrative    Not on file     Social Determinants of Health     Financial Resource Strain:     Difficulty of Paying Living Expenses: Not on file   Food Insecurity:     Worried About Running Out of Food in the Last Year: Not on file    Abram of Food in the Last Year: Not on file   Transportation Needs:     Lack of Transportation (Medical): Not on file    Lack of Transportation (Non-Medical):  Not on file   Physical Activity:     Days of Exercise per Week: Not on file    Minutes of Exercise per Session: Not on file   Stress:     Feeling of Stress : Not on file   Social Connections:     Frequency of Communication with Friends and Family: Not on file    Frequency of Social Gatherings with Friends and Family: Not on file    Attends Mormonism Services: Not on file    Active Member of [address] [place] [address] [employer] or Organizations: Not on file    Attends Club or Organization Meetings: Not on file    Marital Status: Not on file   Intimate Partner Violence:     Fear of Current or Ex-Partner: Not on file    Emotionally Abused: Not on file    Physically Abused: Not on file    Sexually Abused: Not on file   Housing Stability:     Unable to Pay for Housing in the Last Year: Not on file    Number of [place] in the Last Year: Not on file    Unstable Housing in the Last Year: Not on file       Current Outpatient Medications   Medication Sig Dispense Refill    ondansetron (ZOFRAN-ODT) 4 MG disintegrating tablet       aspirin 81 MG chewable tablet Take 1 tablet by mouth daily 30 tablet 3    aluminum & magnesium hydroxide-simethicone (MAALOX) 200-200-20 MG/5ML SUSP suspension Take 30 mLs by mouth every 6 hours as needed for Indigestion 710 mL 1    pantoprazole (PROTONIX) 40 MG tablet Take 1 tablet by mouth every morning (before breakfast) 30 tablet 5    melatonin 3 MG TABS tablet Take 2 tablets by mouth nightly as needed (sleep) 30 tablet 0    zinc sulfate (ZINCATE) 220 (50 Zn) MG capsule Take 1 capsule by mouth daily 30 capsule 3    ascorbic acid (VITAMIN C) 500 MG tablet Take 1 tablet by mouth daily 30 tablet 3    levothyroxine (SYNTHROID) 75 MCG tablet Take 75 mcg by mouth Daily       vitamin D (CHOLECALCIFEROL) 1000 UNIT TABS tablet Take 1,000 Units by mouth daily       No current facility-administered medications for this visit. Review of Systems -     General ROS: negative  Psychological ROS: negative  Hematological and Lymphatic ROS: No history of blood clots or bleeding disorder. Respiratory ROS: no cough,  or wheezing, the rest see HPI  Cardiovascular ROS: See HPI  Gastrointestinal ROS: negative  Genito-Urinary ROS: no dysuria, trouble voiding, or hematuria  Musculoskeletal ROS: negative  Neurological ROS: no TIA or stroke symptoms  Dermatological ROS: negative      Blood pressure 107/74, pulse 73, height 5' 6\" (1.676 m), weight 196 lb 6.4 oz (89.1 kg), SpO2 97 %. Physical Examination:    General appearance - alert, well appearing, and in no distress  HEENT- Pink conjunctiva  , Non-icteri sclera,PERRLA  Mental status - alert, oriented to person, place, and time  Neck - supple, no significant adenopathy, no JVD, or carotid bruits  Chest - clear to auscultation, no wheezes, rales or rhonchi, symmetric air entry  Heart - normal rate, regular rhythm, normal S1, S2, no murmurs, rubs, clicks or gallops  Abdomen - soft, nontender, nondistended, no masses or organomegaly  KAMI- no CVA or flank tenderness, no suprapubic tenderness  Neurological - alert, oriented, normal speech, no focal findings or movement disorder noted  Musculoskeletal/limbs - no joint tenderness, deformity or swelling   - peripheral pulses normal, no pedal edema, no clubbing or cyanosis  Skin - normal coloration and turgor, no rashes, no suspicious skin lesions noted  Psych- appropriate mood and affect    Lab  No results for input(s): CKTOTAL, CKMB, CKMBINDEX, TROPONINI in the last 72 hours.   CBC:   Lab Results   Component Value Date    WBC 8.1 12/01/2021    RBC 5.06 12/01/2021    HGB 15.2 12/01/2021    HCT 44.7 12/01/2021    MCV 88.3 12/01/2021    MCH 30.0 12/01/2021    MCHC 34.0 12/01/2021    RDW 13.1 10/09/2017     12/01/2021    MPV 10.4 12/01/2021     BMP:    Lab Results   Component Value Date     12/01/2021    K 5.1 12/01/2021    K 3.9 11/25/2021     12/01/2021    CO2 23 12/01/2021    BUN 16 12/01/2021    LABALBU 3.7 11/20/2021    CREATININE 1.0 12/01/2021    CALCIUM 8.4 12/01/2021    LABGLOM 83 12/01/2021    GLUCOSE 127 12/01/2021     Hepatic Function Panel:    Lab Results   Component Value Date    ALKPHOS 73 11/20/2021    ALT 36 11/20/2021    AST 53 11/20/2021    PROT 6.0 11/20/2021    BILITOT 0.4 11/20/2021    BILIDIR <0.2 10/09/2017    LABALBU 3.7 11/20/2021     Magnesium:    Lab Results   Component Value Date    MG 2.5 11/23/2021     Warfarin PT/INR:  No components found for: PTPATWAR, PTINRWAR  HgBA1c:    Lab Results   Component Value Date    LABA1C 5.7 11/25/2021     FLP:  No results found for: TRIG, HDL, LDLCALC, LDLDIRECT, LABVLDL  TSH:    Lab Results   Component Value Date    TSH 1.910 11/25/2021       ekg 11/25/21  NSR, prwp, no acute abn  No new abn from 2017    Assessment   Diagnosis Orders   1. SOB (shortness of breath) on exertion     2. Abnormal EKG     3. Fatigue, unspecified type     4. Hx of POTS (postural orthostatic tachycardia syndrome)           Plan    Continue the current treatment and with constant vigilance to changes in symptoms and also any potential side effects. Return for care or seek medical attention immediately if symptoms got worse and/or develop new symptoms. Meds and labs reviewed  Sob  Borderline Abn ekg  fatigue   Echo for EF    D/w the pat the [facility]    Discussed use, benefit, and side effects of prescribed medications. All patient questions answered. Pt voiced understanding. Instructed to continue current medications, diet and exercise. Continue risk factor modification and medical management. Patient agreed with treatment plan. Follow up as directed. ,LOW    RTc in 4 weeks      Veronica Hunter [facility]

## 2022-01-07 ENCOUNTER — HOSPITAL ENCOUNTER (OUTPATIENT)
Dept: NON INVASIVE DIAGNOSTICS | Age: 42
Discharge: HOME | End: 2022-01-07
Payer: COMMERCIAL

## 2022-01-07 DIAGNOSIS — R53.83 FATIGUE, UNSPECIFIED TYPE: ICD-10-CM

## 2022-01-07 DIAGNOSIS — G90.A POTS (POSTURAL ORTHOSTATIC TACHYCARDIA SYNDROME): ICD-10-CM

## 2022-01-07 DIAGNOSIS — R06.02 SOB (SHORTNESS OF BREATH) ON EXERTION: ICD-10-CM

## 2022-01-07 DIAGNOSIS — R94.31 ABNORMAL EKG: ICD-10-CM

## 2022-01-07 LAB
LV EF: 60 %
LVEF MODALITY: NORMAL

## 2022-01-07 PROCEDURE — 93306 TTE W/DOPPLER COMPLETE: CPT

## 2022-02-07 ENCOUNTER — OFFICE VISIT (OUTPATIENT)
Dept: CARDIOLOGY CLINIC | Age: 42
End: 2022-02-07
Payer: COMMERCIAL

## 2022-02-07 VITALS
HEART RATE: 91 BPM | WEIGHT: 202 LBS | BODY MASS INDEX: 32.47 KG/M2 | DIASTOLIC BLOOD PRESSURE: 75 MMHG | SYSTOLIC BLOOD PRESSURE: 106 MMHG | HEIGHT: 66 IN

## 2022-02-07 DIAGNOSIS — G90.A POTS (POSTURAL ORTHOSTATIC TACHYCARDIA SYNDROME): Primary | ICD-10-CM

## 2022-02-07 DIAGNOSIS — R06.02 SOB (SHORTNESS OF BREATH) ON EXERTION: ICD-10-CM

## 2022-02-07 DIAGNOSIS — R94.31 ABNORMAL EKG: ICD-10-CM

## 2022-02-07 DIAGNOSIS — R53.83 FATIGUE, UNSPECIFIED TYPE: ICD-10-CM

## 2022-02-07 PROCEDURE — 99213 OFFICE O/P EST LOW 20 MIN: CPT | Performed by: INTERNAL MEDICINE

## 2022-02-07 NOTE — PROGRESS NOTES
Chief Complaint   Patient presents with    1 Month Follow-Up    Shortness of Breath     Had Covid 2021  And was admitted to hospital  Still on home O2 at night at night  amalia on cpap     Hospital fu.  Infarct concern Abnormal EKG    Pt has POTS Dxed  at [facility] on hydration with water and getored not on med  Hx of syncope in 2017 and none after    Previous pt of Dr Trent Kanner 2017      1 month fu-echo    Sob on exertion and worse after covid  Progressively getting better    fatigue and generalized body weakness post Covid getting better    Denies chest pain post covid    Hx of leg edema post hospital d/c and now getting better    Pt is a     Ex smoker    fhx  None for  CAD in 1st degree relatives    Past Surgical History:   Procedure Laterality Date    PAIN MANAGEMENT PROCEDURE Bilateral 2021    Bilateral L-facet MBB @ L4-5 and L5-S1. performed by Bishop Salvador MD at [place] [address] Bilateral 2021    Lumbar Facet MBB @L4-5, 5-S1 bilateral #2 performed by Bishop Salvador MD at [address] [place] [address]         Allergies   Allergen Reactions    Codeine Other (See Comments)     hallucinations    Azithromycin Rash        Family History   Problem Relation Age of Onset    Cancer Father     Cancer Maternal Grandfather     Heart Disease Paternal Uncle         Social History     Socioeconomic History    Marital status:      Spouse name: Not on file    Number of children: Not on file    Years of education: Not on file    Highest education level: Not on file   Occupational History    Not on file   Tobacco Use    Smoking status: Former Smoker     Quit date: 2016     Years since quittin.4    Smokeless tobacco: Never Used   Vaping Use    Vaping Use: Never used   Substance and Sexual Activity    Alcohol use: No    Drug use: No    Sexual activity: Not on file   Other Topics Concern    Not on file Social History Narrative    Not on file     Social Determinants of Health     Financial Resource Strain:     Difficulty of Paying Living Expenses: Not on file   Food Insecurity:     Worried About Running Out of Food in the Last Year: Not on file    Abram of Food in the Last Year: Not on file   Transportation Needs:     Lack of Transportation (Medical): Not on file    Lack of Transportation (Non-Medical):  Not on file   Physical Activity:     Days of Exercise per Week: Not on file    Minutes of Exercise per Session: Not on file   Stress:     Feeling of Stress : Not on file   Social Connections:     Frequency of Communication with Friends and Family: Not on file    Frequency of Social Gatherings with Friends and Family: Not on file    Attends Yazidism Services: Not on file    Active Member of [address] [place] [address] [employer] or Organizations: Not on file    Attends Club or Organization Meetings: Not on file    Marital Status: Not on file   Intimate Partner Violence:     Fear of Current or Ex-Partner: Not on file    Emotionally Abused: Not on file    Physically Abused: Not on file    Sexually Abused: Not on file   Housing Stability:     Unable to Pay for Housing in the Last Year: Not on file    Number of [place] in the Last Year: Not on file    Unstable Housing in the Last Year: Not on file       Current Outpatient Medications   Medication Sig Dispense Refill    aspirin 81 MG chewable tablet Take 1 tablet by mouth daily 30 tablet 3    pantoprazole (PROTONIX) 40 MG tablet Take 1 tablet by mouth every morning (before breakfast) 30 tablet 5    zinc sulfate (ZINCATE) 220 (50 Zn) MG capsule Take 1 capsule by mouth daily 30 capsule 3    ascorbic acid (VITAMIN C) 500 MG tablet Take 1 tablet by mouth daily 30 tablet 3    levothyroxine (SYNTHROID) 75 MCG tablet Take 75 mcg by mouth Daily       vitamin D (CHOLECALCIFEROL) 1000 UNIT TABS tablet Take 1,000 Units by mouth daily       No current facility-administered medications for this visit. Review of Systems -     General ROS: negative  Psychological ROS: negative  Hematological and Lymphatic ROS: No history of blood clots or bleeding disorder. Respiratory ROS: no cough,  or wheezing, the rest see HPI  Cardiovascular ROS: See HPI  Gastrointestinal ROS: negative  Genito-Urinary ROS: no dysuria, trouble voiding, or hematuria  Musculoskeletal ROS: negative  Neurological ROS: no TIA or stroke symptoms  Dermatological ROS: negative      Blood pressure 106/75, pulse 91, height 5' 6\" (1.676 m), weight 202 lb (91.6 kg). Physical Examination:    General appearance - alert, well appearing, and in no distress  HEENT- Pink conjunctiva  , Non-icteri sclera,PERRLA  Mental status - alert, oriented to person, place, and time  Neck - supple, no significant adenopathy, no JVD, or carotid bruits  Chest - clear to auscultation, no wheezes, rales or rhonchi, symmetric air entry  Heart - normal rate, regular rhythm, normal S1, S2, no murmurs, rubs, clicks or gallops  Abdomen - soft, nontender, nondistended, no masses or organomegaly  KAMI- no CVA or flank tenderness, no suprapubic tenderness  Neurological - alert, oriented, normal speech, no focal findings or movement disorder noted  Musculoskeletal/limbs - no joint tenderness, deformity or swelling   - peripheral pulses normal, no pedal edema, no clubbing or cyanosis  Skin - normal coloration and turgor, no rashes, no suspicious skin lesions noted  Psych- appropriate mood and affect    Lab  No results for input(s): CKTOTAL, CKMB, CKMBINDEX, TROPONINI in the last 72 hours.   CBC:   Lab Results   Component Value Date    WBC 8.1 12/01/2021    RBC 5.06 12/01/2021    HGB 15.2 12/01/2021    HCT 44.7 12/01/2021    MCV 88.3 12/01/2021    MCH 30.0 12/01/2021    MCHC 34.0 12/01/2021    RDW 13.1 10/09/2017     12/01/2021    MPV 10.4 12/01/2021     BMP:    Lab Results   Component Value Date     12/01/2021    K 5.1 12/01/2021    K 3.9 11/25/2021     12/01/2021    CO2 23 12/01/2021    BUN 16 12/01/2021    LABALBU 3.7 11/20/2021    CREATININE 1.0 12/01/2021    CALCIUM 8.4 12/01/2021    LABGLOM 83 12/01/2021    GLUCOSE 127 12/01/2021     Hepatic Function Panel:    Lab Results   Component Value Date    ALKPHOS 73 11/20/2021    ALT 36 11/20/2021    AST 53 11/20/2021    PROT 6.0 11/20/2021    BILITOT 0.4 11/20/2021    BILIDIR <0.2 10/09/2017    LABALBU 3.7 11/20/2021     Magnesium:    Lab Results   Component Value Date    MG 2.5 11/23/2021     Warfarin PT/INR:  No components found for: PTPATWAR, PTINRWAR  HgBA1c:    Lab Results   Component Value Date    LABA1C 5.7 11/25/2021     FLP:  No results found for: TRIG, HDL, LDLCALC, LDLDIRECT, LABVLDL  TSH:    Lab Results   Component Value Date    TSH 1.910 11/25/2021       Conclusions      Summary   Normal left ventricle size and systolic function. Ejection fraction was   estimated at 60 %. There were no regional left ventricular wall motion   abnormalities and wall thickness was within normal limits. Signature      ----------------------------------------------------------------   Electronically signed by Tonda Spurling MD (Interpreting   physician) on 01/07/2022 at 05:13 PM    ekg 11/25/21  NSR, prwp, no acute abn  No new abn from 2017    Assessment   Diagnosis Orders   1. Hx of POTS (postural orthostatic tachycardia syndrome)     2. Abnormal EKG     3. SOB (shortness of breath) on exertion     4. Fatigue, unspecified type           Plan    Continue the current treatment and with constant vigilance to changes in symptoms and also any potential side effects. Return for care or seek medical attention immediately if symptoms got worse and/or develop new symptoms.     Meds and labs reviewed  Sob on exertion off home O2  Borderline Abn ekg  fatigue   Echo for EF WNL    D/w the pat the [address]    Back to semi driving    Getting better     No dizziness    To go to [facility] for seel study    Discussed use, benefit, and side effects of prescribed medications. All patient questions answered. Pt voiced understanding. Instructed to continue current medications, diet and exercise. Continue risk factor modification and medical management. Patient agreed with treatment plan. Follow up as directed. ,LOW    RTc in  6 months    Jen Duarte, [facility]

## 2022-10-25 ENCOUNTER — OFFICE VISIT (OUTPATIENT)
Dept: CARDIOLOGY CLINIC | Age: 42
End: 2022-10-25
Payer: COMMERCIAL

## 2022-10-25 VITALS
HEART RATE: 69 BPM | SYSTOLIC BLOOD PRESSURE: 100 MMHG | OXYGEN SATURATION: 98 % | BODY MASS INDEX: 29.54 KG/M2 | DIASTOLIC BLOOD PRESSURE: 60 MMHG | WEIGHT: 183.8 LBS | HEIGHT: 66 IN

## 2022-10-25 DIAGNOSIS — G90.A POTS (POSTURAL ORTHOSTATIC TACHYCARDIA SYNDROME): Primary | ICD-10-CM

## 2022-10-25 DIAGNOSIS — R06.02 SOB (SHORTNESS OF BREATH) ON EXERTION: ICD-10-CM

## 2022-10-25 DIAGNOSIS — G47.33 OSA ON CPAP: ICD-10-CM

## 2022-10-25 DIAGNOSIS — Z99.89 OSA ON CPAP: ICD-10-CM

## 2022-10-25 DIAGNOSIS — R94.31 ABNORMAL EKG: ICD-10-CM

## 2022-10-25 DIAGNOSIS — E03.9 ACQUIRED HYPOTHYROIDISM: ICD-10-CM

## 2022-10-25 DIAGNOSIS — Z87.898 HISTORY OF DIZZINESS: ICD-10-CM

## 2022-10-25 PROCEDURE — 99213 OFFICE O/P EST LOW 20 MIN: CPT | Performed by: INTERNAL MEDICINE

## 2022-10-25 NOTE — PROGRESS NOTES
Had Covid 2021  And was admitted to hospital  Still on home O2 at night at night  amalia on cpap     Hospital fu. Infarct concern Abnormal EKG    Pt has POTS Dxed  at [facility] on hydration with water and getored not on med  Hx of syncope in 2017 and none after    Previous pt of Dr Daiana Weber         6 month follow up. EKG done 2021.     Sob on exertion  after covid  Progressively getting better    No more dizziness with hydration  Drinking water 3 bottle and 1 gatored    Denied chest pain , palpitation or edema     Hx of leg edema post hospital d/c and now getting better    Pt is a     Ex smoker    fhx  None for  CAD in 1st degree relatives    Past Surgical History:   Procedure Laterality Date    PAIN MANAGEMENT PROCEDURE Bilateral 2021    Bilateral L-facet MBB @ L4-5 and L5-S1. performed by Chantal Scheuermann, MD at [facility] Bilateral 2021    Lumbar Facet MBB @L4-5, 5-S1 bilateral #2 performed by Chantal Scheuermann, MD at [address] [place] [address]         Allergies   Allergen Reactions    Codeine Other (See Comments)     hallucinations    Azithromycin Rash        Family History   Problem Relation Age of Onset    Cancer Father     Cancer Maternal Grandfather     Heart Disease Paternal Uncle         Social History     Socioeconomic History    Marital status:      Spouse name: Not on file    Number of children: Not on file    Years of education: Not on file    Highest education level: Not on file   Occupational History    Not on file   Tobacco Use    Smoking status: Former     Types: Cigarettes     Quit date: 2016     Years since quittin.1    Smokeless tobacco: Never   Vaping Use    Vaping Use: Never used   Substance and Sexual Activity    Alcohol use: No    Drug use: No    Sexual activity: Not on file   Other Topics Concern    Not on file   Social History Narrative    Not on file     Social Determinants of Health     Financial Resource Strain: Not on file   Food Insecurity: Not on file   Transportation Needs: Not on file   Physical Activity: Not on file   Stress: Not on file   Social Connections: Not on file   Intimate Partner Violence: Not on file   Housing Stability: Not on file       Current Outpatient Medications   Medication Sig Dispense Refill    levothyroxine (SYNTHROID) 75 MCG tablet Take 75 mcg by mouth Daily        No current facility-administered medications for this visit. Review of Systems -     General ROS: negative  Psychological ROS: negative  Hematological and Lymphatic ROS: No history of blood clots or bleeding disorder. Respiratory ROS: no cough,  or wheezing, the rest see HPI  Cardiovascular ROS: See HPI  Gastrointestinal ROS: negative  Genito-Urinary ROS: no dysuria, trouble voiding, or hematuria  Musculoskeletal ROS: negative  Neurological ROS: no TIA or stroke symptoms  Dermatological ROS: negative      Blood pressure 100/60, pulse 69, height 5' 6\" (1.676 m), weight 183 lb 12.8 oz (83.4 kg), SpO2 98 %.         Physical Examination:    General appearance - alert, well appearing, and in no distress  HEENT- Pink conjunctiva  , Non-icteri sclera,PERRLA  Mental status - alert, oriented to person, place, and time  Neck - supple, no significant adenopathy, no JVD, or carotid bruits  Chest - clear to auscultation, no wheezes, rales or rhonchi, symmetric air entry  Heart - normal rate, regular rhythm, normal S1, S2, no murmurs, rubs, clicks or gallops  Abdomen - soft, nontender, nondistended, no masses or organomegaly  KAMI- no CVA or flank tenderness, no suprapubic tenderness  Neurological - alert, oriented, normal speech, no focal findings or movement disorder noted  Musculoskeletal/limbs - no joint tenderness, deformity or swelling   - peripheral pulses normal, no pedal edema, no clubbing or cyanosis  Skin - normal coloration and turgor, no rashes, no suspicious skin lesions noted  Psych- appropriate mood and affect    Lab  No results for input(s): CKTOTAL, CKMB, CKMBINDEX, TROPONINI in the last 72 hours. CBC:   Lab Results   Component Value Date/Time    WBC 8.1 12/01/2021 04:14 AM    RBC 5.06 12/01/2021 04:14 AM    HGB 15.2 12/01/2021 04:14 AM    HCT 44.7 12/01/2021 04:14 AM    MCV 88.3 12/01/2021 04:14 AM    MCH 30.0 12/01/2021 04:14 AM    MCHC 34.0 12/01/2021 04:14 AM    RDW 13.1 10/09/2017 11:54 AM     12/01/2021 04:14 AM    MPV 10.4 12/01/2021 04:14 AM     BMP:    Lab Results   Component Value Date/Time     12/01/2021 04:14 AM    K 5.1 12/01/2021 04:14 AM    K 3.9 11/25/2021 04:34 PM     12/01/2021 04:14 AM    CO2 23 12/01/2021 04:14 AM    BUN 16 12/01/2021 04:14 AM    LABALBU 3.7 11/20/2021 09:11 PM    CREATININE 1.0 12/01/2021 04:14 AM    CALCIUM 8.4 12/01/2021 04:14 AM    LABGLOM 83 12/01/2021 04:14 AM    GLUCOSE 127 12/01/2021 04:14 AM     Hepatic Function Panel:    Lab Results   Component Value Date/Time    ALKPHOS 73 11/20/2021 09:11 PM    ALT 36 11/20/2021 09:11 PM    AST 53 11/20/2021 09:11 PM    PROT 6.0 11/20/2021 09:11 PM    BILITOT 0.4 11/20/2021 09:11 PM    BILIDIR <0.2 10/09/2017 12:26 PM    LABALBU 3.7 11/20/2021 09:11 PM     Magnesium:    Lab Results   Component Value Date/Time    MG 2.5 11/23/2021 04:12 AM     Warfarin PT/INR:  No components found for: PTPATWAR, PTINRWAR  HgBA1c:    Lab Results   Component Value Date/Time    LABA1C 5.7 11/25/2021 03:00 PM     FLP:  No results found for: TRIG, HDL, LDLCALC, LDLDIRECT, LABVLDL  TSH:    Lab Results   Component Value Date/Time    TSH 1.910 11/25/2021 04:34 PM       Conclusions      Summary   Normal left ventricle size and systolic function. Ejection fraction was   estimated at 60 %. There were no regional left ventricular wall motion   abnormalities and wall thickness was within normal limits.       Signature      ----------------------------------------------------------------   Electronically signed by Curtis Scott MD (Interpreting   physician) on 01/07/2022 at 05:13 PM    ekg 11/25/21  NSR, prwp, no acute abn  No new abn from 2017    Assessment   Diagnosis Orders   1. Hx of POTS (postural orthostatic tachycardia syndrome)        2. SOB (shortness of breath) on exertion        3. History of dizziness        4. Abnormal EKG        5. MAURA on CPAP        6. Acquired hypothyroidism            Had TTT done in [facility] 2017    Plan    Continue the current treatment and with constant vigilance to changes in symptoms and also any potential side effects. Return for care or seek medical attention immediately if symptoms got worse and/or develop new symptoms. The current Meds and labs reviewed  Sob on exertion off home O2  Borderline Abn ekg  fatigue   Echo for EF WNL    D/w the pat the [facility]    Back to semi driving    Getting better     No dizziness    MAURA on cpap    Doing well and stable    Drinking water 3 bottle and 1 gatored    Hashimotos thyroiditis    Discussed use, benefit, and side effects of prescribed medications. All patient questions answered. Pt voiced understanding. Instructed to continue current medications, diet and exercise. Continue risk factor modification and medical management. Patient agreed with treatment plan.  Follow up as directed.,  RTc in  6 months    Sivakumar Mccracken, [facility]

## 2023-05-23 ENCOUNTER — OFFICE VISIT (OUTPATIENT)
Dept: CARDIOLOGY CLINIC | Age: 43
End: 2023-05-23
Payer: COMMERCIAL

## 2023-05-23 VITALS
SYSTOLIC BLOOD PRESSURE: 108 MMHG | DIASTOLIC BLOOD PRESSURE: 68 MMHG | WEIGHT: 195.8 LBS | HEIGHT: 66 IN | HEART RATE: 67 BPM | BODY MASS INDEX: 31.47 KG/M2

## 2023-05-23 DIAGNOSIS — R06.02 SOB (SHORTNESS OF BREATH) ON EXERTION: ICD-10-CM

## 2023-05-23 DIAGNOSIS — G90.A POTS (POSTURAL ORTHOSTATIC TACHYCARDIA SYNDROME): Primary | ICD-10-CM

## 2023-05-23 DIAGNOSIS — R94.31 ABNORMAL EKG: ICD-10-CM

## 2023-05-23 DIAGNOSIS — Z87.898 HISTORY OF DIZZINESS: ICD-10-CM

## 2023-05-23 PROCEDURE — 93000 ELECTROCARDIOGRAM COMPLETE: CPT | Performed by: INTERNAL MEDICINE

## 2023-05-23 PROCEDURE — 99214 OFFICE O/P EST MOD 30 MIN: CPT | Performed by: INTERNAL MEDICINE

## 2023-05-23 NOTE — PROGRESS NOTES
Had Covid 2021  And was admitted to hospital  Still on home O2 at night at night  amalia on cpap     Hospital fu.  Infarct concern Abnormal EKG    Pt has POTS Dxed  at [facility] on hydration with water and getored not on med  Hx of syncope in 2017 and none after    Previous pt of Dr Cem Ham 2017        Pt here for a 6 month f/u    EKG done today    Continue Sob on exertion  after covid- better  Progressively getting better    No more dizziness with hydration  Drinking water 3 bottle and 1 gatored    Denied chest pain , palpitation or edema     Hx of leg edema post hospital d/c and now getting better    Pt is a     Ex smoker    fhx  None for  CAD in 1st degree relatives    Past Surgical History:   Procedure Laterality Date    PAIN MANAGEMENT PROCEDURE Bilateral 2021    Bilateral L-facet MBB @ L4-5 and L5-S1. performed by Candelario Mckeon MD at [facility] Bilateral 2021    Lumbar Facet MBB @L4-5, 5-S1 bilateral #2 performed by Candelario Mckeon MD at [address] [place] [address]         Allergies   Allergen Reactions    Codeine Other (See Comments)     hallucinations    Azithromycin Rash        Family History   Problem Relation Age of Onset    Cancer Father     Cancer Maternal Grandfather     Heart Disease Paternal Uncle         Social History     Socioeconomic History    Marital status:      Spouse name: Not on file    Number of children: Not on file    Years of education: Not on file    Highest education level: Not on file   Occupational History    Not on file   Tobacco Use    Smoking status: Former     Types: Cigarettes     Quit date: 2016     Years since quittin.7    Smokeless tobacco: Never   Vaping Use    Vaping Use: Never used   Substance and Sexual Activity    Alcohol use: No    Drug use: No    Sexual activity: Not on file   Other Topics Concern    Not on file   Social History Narrative    Not on

## 2023-06-30 NOTE — CARE COORDINATION
11/22/21, 7:15 AM EST  DISCHARGE PLANNING EVALUATION:    Jordyn Kendrick       Admitted: 11/20/2021/ [facility] day: 2   Location: [facility] Reason for admit: Dyspnea and respiratory abnormalities [R06.00, R06.89]  Hypoxia [R09.02]  Acute hypoxemic respiratory failure due to COVID-19 ([address] [U07.1, J96.01]  Pneumonia due to COVID-19 virus [U07.1, J12.82]   PMH:  has a past medical history of Heart murmur, Hypotension, POTS (postural orthostatic tachycardia syndrome), and Thyroid disease. Procedure: none  Barriers to Discharge:  CRP 6.24. Bradycardic into the 40s. Requiring bipap, FiO2 100, sat is 97%. Nebs, IV zithromax, IV rocephin, IV decadron, baricitinib, lovenox, Vit C, Vit D, zinc. Acapella. PCP: Suresh Gilmore MD  Readmission Risk Score: 6.6 ( )%    Patient Goals/Plan/Treatment Preferences: Spoke with Arron's wife Mónica via phone. She states prior to this illness, that Zahra Segovia is independent and has not required [facility] or [facility] services. Mónica is a PT and feels at this time that she can provide him with the assist he will need at discharge. If oxygen is needed, she has no preference other than someone local (if that is an option) and in-network with his insurance. Transportation/Food Security/Housekeeping Addressed:  No issues identified.
Discharge Planning Update:    Hospitalist following. Requiring 5-6L NC. CRP down to 0.78 from 2.88. ASA. IV zithromax. Baricitinib. IV rocephin. IV Decadron. Vit C, D, zinc. Plans home with wife. Follow for home O2. Update: planning home today as long as he maintains sats with ambulation. Await completion of testing but does require O2 at rest. Cpap machine is from a company no longer in business and currently received cpap supplies from [employer] (does not supply o2). Discussed bleed in with primary RN and provider. Referral given to [facility] THOMPSON, notified of cpap bleed in.     11/24/21, 3:30 PM EST    Patient goals/plan/ treatment preferences discussed by  and . Patient goals/plan/ treatment preferences reviewed with patient/ family. Patient/ family verbalize understanding of discharge plan and are in agreement with goal/plan/treatment preferences. Understanding was demonstrated using the teach back method. AVS provided by RN at time of discharge, which includes all necessary medical information pertaining to the patients current course of illness, treatment, post-discharge goals of care, and treatment preferences.
Discharge Planning Update:    Remains on high flow, FiO2 40, 50L, sat is 97%. Using bipap at hs. Nebs, IV zithromax, IV rocephin, IV decadron, lovenox, Vit C, Vit D, zinc.  Plans home with wife at discharge.   Electronically signed by Amelia Godinez RN on 11/23/2021 at 3:14 PM
Barbara Michelle  [address] [facility], [address]  Phone: [phone]  Fax: (   )    -  Follow Up Time: 1 week

## 2023-11-28 ENCOUNTER — OFFICE VISIT (OUTPATIENT)
Dept: CARDIOLOGY CLINIC | Age: 43
End: 2023-11-28
Payer: COMMERCIAL

## 2023-11-28 VITALS
HEART RATE: 68 BPM | BODY MASS INDEX: 33.91 KG/M2 | HEIGHT: 66 IN | SYSTOLIC BLOOD PRESSURE: 120 MMHG | WEIGHT: 211 LBS | DIASTOLIC BLOOD PRESSURE: 73 MMHG

## 2023-11-28 DIAGNOSIS — G90.A POTS (POSTURAL ORTHOSTATIC TACHYCARDIA SYNDROME): Primary | ICD-10-CM

## 2023-11-28 DIAGNOSIS — Z87.898 HISTORY OF DIZZINESS: ICD-10-CM

## 2023-11-28 DIAGNOSIS — R94.31 ABNORMAL EKG: ICD-10-CM

## 2023-11-28 DIAGNOSIS — R06.02 SOB (SHORTNESS OF BREATH) ON EXERTION: ICD-10-CM

## 2023-11-28 PROCEDURE — 99214 OFFICE O/P EST MOD 30 MIN: CPT | Performed by: INTERNAL MEDICINE

## 2023-11-28 NOTE — PROGRESS NOTES
Had Covid 2021  And was admitted to hospital  Still on home O2 at night at night  amalia on cpap     Hospital fu. Infarct concern Abnormal EKG    Pt has POTS Dxed  at [facility] on hydration with water and getored not on med  Hx of syncope in 2017 and none after    Previous pt of Dr Vimal Olivera 2017      6 month follow up. EKG done 2023.     Denied chest pain , palpitation or edema     Gained like 16 lb with no leg edema  Flesh wt gain    No more dizziness with hydration  Drinking water 3 bottle and 1 gatored    Continue Sob on exertion  after covid- better  Progressively getting better and now unchanged for 6 month and advised to see pulm    Hx of leg edema post hospital d/c and now getting better  Now trace  to none    Pt is a     Ex smoker  \2ppd for 16 yrs    fhx  None for  CAD in 1st degree relatives    Past Surgical History:   Procedure Laterality Date    PAIN MANAGEMENT PROCEDURE Bilateral 2021    Bilateral L-facet MBB @ L4-5 and L5-S1. performed by Stefan Ashley MD at [address] Bilateral 2021    Lumbar Facet MBB @L4-5, 5-S1 bilateral #2 performed by Stefan Ashley MD at [address] EXTRACTION         Allergies   Allergen Reactions    Codeine Other (See Comments)     hallucinations    Azithromycin Rash        Family History   Problem Relation Age of Onset    Cancer Father     Cancer Maternal Grandfather     Heart Disease Paternal Uncle         Social History     Socioeconomic History    Marital status:      Spouse name: Not on file    Number of children: Not on file    Years of education: Not on file    Highest education level: Not on file   Occupational History    Not on file   Tobacco Use    Smoking status: Former     Types: Cigarettes     Quit date: 2016     Years since quittin.2    Smokeless tobacco: Never   Vaping Use    Vaping Use: Never used   Substance and Sexual Activity

## (undated) DEVICE — GLOVE ORANGE PI 7 1/2   MSG9075

## (undated) DEVICE — SHEET,DRAPE,3/4,53X77,STERILE: Brand: MEDLINE

## (undated) DEVICE — NEEDLE SPNL 22GA L3.5IN BLK HUB S STL REG WALL FIT STYL W/

## (undated) DEVICE — CHLORAPREP 26ML CLEAR

## (undated) DEVICE — NEEDLE SYR 18GA L1.5IN RED PLAS HUB S STL BLNT FILL W/O

## (undated) DEVICE — GAUZE SPONGES,USP TYPE VII GAUZE, 12 PLY: Brand: CURITY

## (undated) DEVICE — SYRINGE MED 10ML LUERLOCK TIP W/O SFTY DISP

## (undated) DEVICE — HYPODERMIC SAFETY NEEDLE: Brand: MAGELLAN

## (undated) DEVICE — TOWEL,OR,DSP,ST,BLUE,STD,4/PK,20PK/CS: Brand: MEDLINE